# Patient Record
Sex: MALE | Race: WHITE | Employment: OTHER | URBAN - METROPOLITAN AREA
[De-identification: names, ages, dates, MRNs, and addresses within clinical notes are randomized per-mention and may not be internally consistent; named-entity substitution may affect disease eponyms.]

---

## 2017-12-19 ENCOUNTER — HOSPITAL ENCOUNTER (EMERGENCY)
Age: 52
Discharge: HOME | End: 2017-12-19
Attending: EMERGENCY MEDICINE
Payer: MEDICARE

## 2017-12-19 VITALS
OXYGEN SATURATION: 96 % | SYSTOLIC BLOOD PRESSURE: 142 MMHG | TEMPERATURE: 98.5 F | BODY MASS INDEX: 30.05 KG/M2 | HEIGHT: 70 IN | DIASTOLIC BLOOD PRESSURE: 76 MMHG | WEIGHT: 209.88 LBS | RESPIRATION RATE: 18 BRPM | HEART RATE: 63 BPM

## 2017-12-19 DIAGNOSIS — M54.50 ACUTE EXACERBATION OF CHRONIC LOW BACK PAIN: Primary | ICD-10-CM

## 2017-12-19 DIAGNOSIS — G89.29 ACUTE EXACERBATION OF CHRONIC LOW BACK PAIN: Primary | ICD-10-CM

## 2017-12-19 PROCEDURE — 96372 THER/PROPH/DIAG INJ SC/IM: CPT

## 2017-12-19 PROCEDURE — 99282 EMERGENCY DEPT VISIT SF MDM: CPT

## 2017-12-19 PROCEDURE — 6360000002 HC RX W HCPCS: Performed by: PHYSICIAN ASSISTANT

## 2017-12-19 RX ORDER — IBUPROFEN 800 MG/1
800 TABLET ORAL EVERY 8 HOURS PRN
Qty: 30 TABLET | Refills: 0 | Status: ON HOLD | OUTPATIENT
Start: 2017-12-19 | End: 2018-06-01 | Stop reason: HOSPADM

## 2017-12-19 RX ORDER — ORPHENADRINE CITRATE 30 MG/ML
60 INJECTION INTRAMUSCULAR; INTRAVENOUS ONCE
Status: COMPLETED | OUTPATIENT
Start: 2017-12-19 | End: 2017-12-19

## 2017-12-19 RX ORDER — MORPHINE SULFATE 10 MG/ML
10 INJECTION, SOLUTION INTRAMUSCULAR; INTRAVENOUS ONCE
Status: COMPLETED | OUTPATIENT
Start: 2017-12-19 | End: 2017-12-19

## 2017-12-19 RX ORDER — HYDROCODONE BITARTRATE AND ACETAMINOPHEN 5; 325 MG/1; MG/1
1 TABLET ORAL EVERY 6 HOURS PRN
Qty: 20 TABLET | Refills: 0 | Status: SHIPPED | OUTPATIENT
Start: 2017-12-19 | End: 2017-12-26

## 2017-12-19 RX ORDER — METHOCARBAMOL 750 MG/1
750 TABLET, FILM COATED ORAL 4 TIMES DAILY
Qty: 40 TABLET | Refills: 0 | Status: SHIPPED | OUTPATIENT
Start: 2017-12-19 | End: 2017-12-29

## 2017-12-19 RX ORDER — ONDANSETRON 4 MG/1
4 TABLET, ORALLY DISINTEGRATING ORAL ONCE
Status: COMPLETED | OUTPATIENT
Start: 2017-12-19 | End: 2017-12-19

## 2017-12-19 RX ADMIN — ONDANSETRON 4 MG: 4 TABLET, ORALLY DISINTEGRATING ORAL at 17:00

## 2017-12-19 RX ADMIN — MORPHINE SULFATE 10 MG: 10 INJECTION, SOLUTION INTRAMUSCULAR; INTRAVENOUS at 17:00

## 2017-12-19 RX ADMIN — ORPHENADRINE CITRATE 60 MG: 30 INJECTION INTRAMUSCULAR; INTRAVENOUS at 16:59

## 2017-12-19 ASSESSMENT — PAIN DESCRIPTION - PAIN TYPE: TYPE: ACUTE PAIN

## 2017-12-19 ASSESSMENT — PAIN DESCRIPTION - LOCATION: LOCATION: BACK

## 2017-12-19 ASSESSMENT — PAIN DESCRIPTION - ORIENTATION: ORIENTATION: LOWER

## 2017-12-19 ASSESSMENT — PAIN DESCRIPTION - FREQUENCY: FREQUENCY: CONTINUOUS

## 2017-12-19 ASSESSMENT — PAIN DESCRIPTION - ONSET: ONSET: ON-GOING

## 2017-12-19 ASSESSMENT — PAIN DESCRIPTION - PROGRESSION: CLINICAL_PROGRESSION: NOT CHANGED

## 2017-12-19 ASSESSMENT — PAIN DESCRIPTION - DESCRIPTORS: DESCRIPTORS: SHARP;SHOOTING

## 2017-12-19 ASSESSMENT — PAIN SCALES - GENERAL: PAINLEVEL_OUTOF10: 7

## 2017-12-19 NOTE — ED PROVIDER NOTES
[facility] ED  eMERGENCY dEPARTMENT eNCOUnter      Pt Name: Graham Hodgkin  MRN: [id number]  [place] 1965  Date of evaluation: 12/19/2017  Provider: Yue Jackson Dr       Chief Complaint   Patient presents with    Back Pain         HISTORY OF PRESENT ILLNESS  (Location/Symptom, Timing/Onset, Context/Setting, Quality, Duration, Modifying Factors, Severity.)   Graham Hodgkin is a 47 y.o. male who presents to the emergency department with Back pain since earlier today at work. Patient with history of chronic back pain. He sees Dr. Moises Tineo who according to patient was to do surgery but patient has elected not to do so due to the fact he cannot miss work for 2 months. He was doing some bending and lifting at work and tweaked his back and now describes the pain as moderate, sharp, stiff and constant. Pain is worse with movement and relieved with rest.  Denies any urinary or bowel incontinence. Nursing Notes were reviewed. ALLERGIES     Sulfa antibiotics    CURRENT MEDICATIONS       Previous Medications    ALFUZOSIN (UROXATRAL) 10 MG EXTENDED RELEASE TABLET    Take 1 tablet by mouth daily (with breakfast)    ONDANSETRON (ZOFRAN ODT) 4 MG DISINTEGRATING TABLET    Take 1 tablet by mouth every 8 hours as needed for Nausea    OXYBUTYNIN (DITROPAN-XL) 5 MG EXTENDED RELEASE TABLET    Take 1 tablet by mouth nightly       PAST MEDICAL HISTORY         Diagnosis Date    Degenerative disc disease        SURGICAL HISTORY           Procedure Laterality Date    APPENDECTOMY      CYSTOSCOPY  10/05/2016    LITHOTRIPSY Right 10/19/2016         FAMILY HISTORY     History reviewed. No pertinent family history. No family status information on file. SOCIAL HISTORY      reports that he has quit smoking. His smoking use included Cigarettes. He has never used smokeless tobacco. He reports that he drinks alcohol. He reports that he does not use drugs.     REVIEW OF SYSTEMS    (2-9 systems for level 4, 10 or more for level 5)   Review of Systems    Except as noted above the remainder of the review of systems was reviewed and negative. PHYSICAL EXAM    (up to 7 for level 4, 8 or more for level 5)     ED Triage Vitals [12/19/17 1620]   BP Temp Temp Source Pulse Resp SpO2 Height Weight   (!) 142/76 98.5 °F (36.9 °C) Oral 63 18 96 % 5' 10\" (1.778 m) 209 lb 14.1 oz (95.2 kg)     Physical Exam   Constitutional: He is oriented to person, place, and time. He appears well-developed. HENT:   Head: Normocephalic and atraumatic. Eyes: EOM are normal.   Neck: Normal range of motion. Neck supple. Cardiovascular: Normal rate and regular rhythm. Pulmonary/Chest: Effort normal and breath sounds normal.   Abdominal: Soft. Musculoskeletal: Normal range of motion. Back:    Neurological: He is alert and oriented to person, place, and time. Skin: Skin is warm. Psychiatric: He has a normal mood and affect. His behavior is normal.               DIAGNOSTIC RESULTS     EKG: All EKG's are interpreted by the Emergency Department Physician who either signs or Co-signs this chart in the absence of a cardiologist.        RADIOLOGY:   Non-plain film images such as CT, Ultrasound and MRI are read by the radiologist. Plain radiographic images are visualized and preliminarily interpreted by the emergency physician with the below findings:        Interpretation per the Radiologist below, if available at the time of this note:          ED BEDSIDE ULTRASOUND:   Performed by ED Physician - none    LABS:  Labs Reviewed - No data to display    All other labs were within normal range or not returned as of this dictation.     EMERGENCY DEPARTMENT COURSE and DIFFERENTIAL DIAGNOSIS/MDM:   Vitals:    Vitals:    12/19/17 1620   BP: (!) 142/76   Pulse: 63   Resp: 18   Temp: 98.5 °F (36.9 °C)   TempSrc: Oral   SpO2: 96%   Weight: 209 lb 14.1 oz (95.2 kg)   Height: 5' 10\" (1.778 m)     Patient has had aggressive

## 2018-05-23 ENCOUNTER — HOSPITAL ENCOUNTER (OUTPATIENT)
Dept: PREADMISSION TESTING | Age: 53
Discharge: HOME | End: 2018-05-27
Payer: MEDICARE

## 2018-05-23 VITALS
OXYGEN SATURATION: 96 % | HEIGHT: 70 IN | DIASTOLIC BLOOD PRESSURE: 72 MMHG | BODY MASS INDEX: 28.06 KG/M2 | HEART RATE: 72 BPM | SYSTOLIC BLOOD PRESSURE: 107 MMHG | WEIGHT: 196 LBS | TEMPERATURE: 98.8 F | RESPIRATION RATE: 16 BRPM

## 2018-05-23 LAB
ABSOLUTE EOS #: 0.12 K/UL (ref 0–0.44)
ABSOLUTE IMMATURE GRANULOCYTE: 0.04 K/UL (ref 0–0.3)
ABSOLUTE LYMPH #: 1.83 K/UL (ref 1.1–3.7)
ABSOLUTE MONO #: 0.7 K/UL (ref 0.1–1.2)
ANION GAP SERPL CALCULATED.3IONS-SCNC: 18 MMOL/L (ref 9–17)
BASOPHILS # BLD: 1 % (ref 0–2)
BASOPHILS ABSOLUTE: 0.09 K/UL (ref 0–0.2)
CHLORIDE BLD-SCNC: 107 MMOL/L (ref 98–107)
CO2: 22 MMOL/L (ref 20–31)
DIFFERENTIAL TYPE: ABNORMAL
EKG ATRIAL RATE: 69 BPM
EKG P AXIS: 45 DEGREES
EKG P-R INTERVAL: 170 MS
EKG Q-T INTERVAL: 392 MS
EKG QRS DURATION: 92 MS
EKG QTC CALCULATION (BAZETT): 420 MS
EKG R AXIS: -34 DEGREES
EKG T AXIS: -4 DEGREES
EKG VENTRICULAR RATE: 69 BPM
EOSINOPHILS RELATIVE PERCENT: 1 % (ref 1–4)
HCT VFR BLD CALC: 45.1 % (ref 40.7–50.3)
HEMOGLOBIN: 15.3 G/DL (ref 13–17)
IMMATURE GRANULOCYTES: 0 %
LYMPHOCYTES # BLD: 19 % (ref 24–43)
MCH RBC QN AUTO: 30.3 PG (ref 25.2–33.5)
MCHC RBC AUTO-ENTMCNC: 33.9 G/DL (ref 28.4–34.8)
MCV RBC AUTO: 89.3 FL (ref 82.6–102.9)
MONOCYTES # BLD: 7 % (ref 3–12)
NRBC AUTOMATED: 0 PER 100 WBC
PDW BLD-RTO: 12.5 % (ref 11.8–14.4)
PLATELET # BLD: 185 K/UL (ref 138–453)
PLATELET ESTIMATE: ABNORMAL
PMV BLD AUTO: 9.4 FL (ref 8.1–13.5)
POTASSIUM SERPL-SCNC: 4.2 MMOL/L (ref 3.7–5.3)
RBC # BLD: 5.05 M/UL (ref 4.21–5.77)
RBC # BLD: ABNORMAL 10*6/UL
SEG NEUTROPHILS: 72 % (ref 36–65)
SEGMENTED NEUTROPHILS ABSOLUTE COUNT: 6.68 K/UL (ref 1.5–8.1)
SODIUM BLD-SCNC: 147 MMOL/L (ref 135–144)
WBC # BLD: 9.5 K/UL (ref 3.5–11.3)
WBC # BLD: ABNORMAL 10*3/UL

## 2018-05-23 PROCEDURE — 36415 COLL VENOUS BLD VENIPUNCTURE: CPT

## 2018-05-23 PROCEDURE — 85025 COMPLETE CBC W/AUTO DIFF WBC: CPT

## 2018-05-23 PROCEDURE — 80051 ELECTROLYTE PANEL: CPT

## 2018-05-23 PROCEDURE — 93005 ELECTROCARDIOGRAM TRACING: CPT

## 2018-05-23 RX ORDER — SODIUM CHLORIDE, SODIUM LACTATE, POTASSIUM CHLORIDE, CALCIUM CHLORIDE 600; 310; 30; 20 MG/100ML; MG/100ML; MG/100ML; MG/100ML
1000 INJECTION, SOLUTION INTRAVENOUS CONTINUOUS
Status: CANCELLED | OUTPATIENT
Start: 2018-05-23

## 2018-05-23 ASSESSMENT — PAIN SCALES - GENERAL: PAINLEVEL_OUTOF10: 7

## 2018-05-23 ASSESSMENT — PAIN DESCRIPTION - ORIENTATION: ORIENTATION: LOWER

## 2018-05-23 ASSESSMENT — PAIN DESCRIPTION - PAIN TYPE: TYPE: CHRONIC PAIN

## 2018-05-23 ASSESSMENT — PAIN DESCRIPTION - LOCATION: LOCATION: BACK

## 2018-05-30 ENCOUNTER — APPOINTMENT (OUTPATIENT)
Dept: GENERAL RADIOLOGY | Age: 53
DRG: 304 | End: 2018-05-30
Attending: NEUROLOGICAL SURGERY
Payer: MEDICARE

## 2018-05-30 ENCOUNTER — HOSPITAL ENCOUNTER (INPATIENT)
Age: 53
LOS: 2 days | Discharge: HOME | DRG: 304 | End: 2018-06-01
Attending: NEUROLOGICAL SURGERY | Admitting: NEUROLOGICAL SURGERY
Payer: MEDICARE

## 2018-05-30 ENCOUNTER — ANESTHESIA EVENT (OUTPATIENT)
Dept: OPERATING ROOM | Age: 53
DRG: 304 | End: 2018-05-30
Payer: MEDICARE

## 2018-05-30 ENCOUNTER — ANESTHESIA (OUTPATIENT)
Dept: OPERATING ROOM | Age: 53
DRG: 304 | End: 2018-05-30
Payer: MEDICARE

## 2018-05-30 VITALS — SYSTOLIC BLOOD PRESSURE: 142 MMHG | DIASTOLIC BLOOD PRESSURE: 52 MMHG | TEMPERATURE: 98.7 F | OXYGEN SATURATION: 99 %

## 2018-05-30 DIAGNOSIS — M48.062 LUMBAR STENOSIS WITH NEUROGENIC CLAUDICATION: Primary | ICD-10-CM

## 2018-05-30 PROCEDURE — 3600000004 HC SURGERY LEVEL 4 BASE: Performed by: NEUROLOGICAL SURGERY

## 2018-05-30 PROCEDURE — 6360000002 HC RX W HCPCS: Performed by: NEUROLOGICAL SURGERY

## 2018-05-30 PROCEDURE — 3700000001 HC ADD 15 MINUTES (ANESTHESIA): Performed by: NEUROLOGICAL SURGERY

## 2018-05-30 PROCEDURE — 7100000001 HC PACU RECOVERY - ADDTL 15 MIN: Performed by: NEUROLOGICAL SURGERY

## 2018-05-30 PROCEDURE — 0SG0071 FUSION OF LUMBAR VERTEBRAL JOINT WITH AUTOLOGOUS TISSUE SUBSTITUTE, POSTERIOR APPROACH, POSTERIOR COLUMN, OPEN APPROACH: ICD-10-PCS | Performed by: NEUROLOGICAL SURGERY

## 2018-05-30 PROCEDURE — C1713 ANCHOR/SCREW BN/BN,TIS/BN: HCPCS | Performed by: NEUROLOGICAL SURGERY

## 2018-05-30 PROCEDURE — C9362 IMPLNT,BON VOID FILLER-STRIP: HCPCS | Performed by: NEUROLOGICAL SURGERY

## 2018-05-30 PROCEDURE — 3700000000 HC ANESTHESIA ATTENDED CARE: Performed by: NEUROLOGICAL SURGERY

## 2018-05-30 PROCEDURE — 2580000003 HC RX 258: Performed by: NEUROLOGICAL SURGERY

## 2018-05-30 PROCEDURE — 1200000000 HC SEMI PRIVATE

## 2018-05-30 PROCEDURE — 2780000010 HC IMPLANT OTHER: Performed by: NEUROLOGICAL SURGERY

## 2018-05-30 PROCEDURE — 6370000000 HC RX 637 (ALT 250 FOR IP): Performed by: NEUROLOGICAL SURGERY

## 2018-05-30 PROCEDURE — 6360000002 HC RX W HCPCS: Performed by: ANESTHESIOLOGY

## 2018-05-30 PROCEDURE — 0SG00AJ FUSION OF LUMBAR VERTEBRAL JOINT WITH INTERBODY FUSION DEVICE, POSTERIOR APPROACH, ANTERIOR COLUMN, OPEN APPROACH: ICD-10-PCS | Performed by: NEUROLOGICAL SURGERY

## 2018-05-30 PROCEDURE — A6402 STERILE GAUZE <= 16 SQ IN: HCPCS | Performed by: NEUROLOGICAL SURGERY

## 2018-05-30 PROCEDURE — L8699 PROSTHETIC IMPLANT NOS: HCPCS | Performed by: NEUROLOGICAL SURGERY

## 2018-05-30 PROCEDURE — 3600000014 HC SURGERY LEVEL 4 ADDTL 15MIN: Performed by: NEUROLOGICAL SURGERY

## 2018-05-30 PROCEDURE — 7100000000 HC PACU RECOVERY - FIRST 15 MIN: Performed by: NEUROLOGICAL SURGERY

## 2018-05-30 PROCEDURE — 72100 X-RAY EXAM L-S SPINE 2/3 VWS: CPT

## 2018-05-30 PROCEDURE — 2580000003 HC RX 258: Performed by: ANESTHESIOLOGY

## 2018-05-30 PROCEDURE — 2720000010 HC SURG SUPPLY STERILE: Performed by: NEUROLOGICAL SURGERY

## 2018-05-30 PROCEDURE — 2500000003 HC RX 250 WO HCPCS: Performed by: NEUROLOGICAL SURGERY

## 2018-05-30 PROCEDURE — 2500000003 HC RX 250 WO HCPCS: Performed by: NURSE ANESTHETIST, CERTIFIED REGISTERED

## 2018-05-30 PROCEDURE — 6360000002 HC RX W HCPCS: Performed by: NURSE ANESTHETIST, CERTIFIED REGISTERED

## 2018-05-30 DEVICE — GRAFT BNE SUB 7.2CC W15XL50MM MINERALIZED CLLGN SCFLD: Type: IMPLANTABLE DEVICE | Site: SPINE LUMBAR | Status: FUNCTIONAL

## 2018-05-30 DEVICE — SCREW SPNL L40MM DIA6.5MM POLYAX CANN: Type: IMPLANTABLE DEVICE | Site: SPINE LUMBAR | Status: FUNCTIONAL

## 2018-05-30 DEVICE — SET SCR SPNL STD PEEK SILVERTON: Type: IMPLANTABLE DEVICE | Site: SPINE LUMBAR | Status: FUNCTIONAL

## 2018-05-30 DEVICE — IMPLANTABLE DEVICE: Type: IMPLANTABLE DEVICE | Site: SPINE LUMBAR | Status: FUNCTIONAL

## 2018-05-30 DEVICE — SCREW LANX MINI-INVAS 6.5X50: Type: IMPLANTABLE DEVICE | Site: SPINE LUMBAR | Status: FUNCTIONAL

## 2018-05-30 RX ORDER — SODIUM CHLORIDE, SODIUM LACTATE, POTASSIUM CHLORIDE, CALCIUM CHLORIDE 600; 310; 30; 20 MG/100ML; MG/100ML; MG/100ML; MG/100ML
INJECTION, SOLUTION INTRAVENOUS CONTINUOUS
Status: DISCONTINUED | OUTPATIENT
Start: 2018-05-30 | End: 2018-06-01 | Stop reason: HOSPADM

## 2018-05-30 RX ORDER — DOCUSATE SODIUM 100 MG/1
100 CAPSULE, LIQUID FILLED ORAL 2 TIMES DAILY
Status: DISCONTINUED | OUTPATIENT
Start: 2018-05-30 | End: 2018-06-01 | Stop reason: HOSPADM

## 2018-05-30 RX ORDER — LIDOCAINE HYDROCHLORIDE 10 MG/ML
INJECTION, SOLUTION EPIDURAL; INFILTRATION; INTRACAUDAL; PERINEURAL PRN
Status: DISCONTINUED | OUTPATIENT
Start: 2018-05-30 | End: 2018-05-30 | Stop reason: SDUPTHER

## 2018-05-30 RX ORDER — ACETAMINOPHEN 325 MG/1
650 TABLET ORAL EVERY 4 HOURS PRN
Status: DISCONTINUED | OUTPATIENT
Start: 2018-05-30 | End: 2018-06-01 | Stop reason: HOSPADM

## 2018-05-30 RX ORDER — MORPHINE SULFATE 2 MG/ML
2 INJECTION, SOLUTION INTRAMUSCULAR; INTRAVENOUS
Status: DISCONTINUED | OUTPATIENT
Start: 2018-05-30 | End: 2018-06-01 | Stop reason: HOSPADM

## 2018-05-30 RX ORDER — FENTANYL CITRATE 50 UG/ML
50 INJECTION, SOLUTION INTRAMUSCULAR; INTRAVENOUS ONCE
Status: DISCONTINUED | OUTPATIENT
Start: 2018-05-30 | End: 2018-06-01 | Stop reason: HOSPADM

## 2018-05-30 RX ORDER — MIDAZOLAM HYDROCHLORIDE 1 MG/ML
1 INJECTION INTRAMUSCULAR; INTRAVENOUS
Status: ACTIVE | OUTPATIENT
Start: 2018-05-30 | End: 2018-05-30

## 2018-05-30 RX ORDER — FENTANYL CITRATE 50 UG/ML
50 INJECTION, SOLUTION INTRAMUSCULAR; INTRAVENOUS EVERY 5 MIN PRN
Status: COMPLETED | OUTPATIENT
Start: 2018-05-30 | End: 2018-05-30

## 2018-05-30 RX ORDER — GINSENG 100 MG
CAPSULE ORAL PRN
Status: DISCONTINUED | OUTPATIENT
Start: 2018-05-30 | End: 2018-05-30 | Stop reason: HOSPADM

## 2018-05-30 RX ORDER — SODIUM CHLORIDE 0.9 % (FLUSH) 0.9 %
10 SYRINGE (ML) INJECTION EVERY 12 HOURS SCHEDULED
Status: DISCONTINUED | OUTPATIENT
Start: 2018-05-30 | End: 2018-06-01 | Stop reason: HOSPADM

## 2018-05-30 RX ORDER — MAGNESIUM HYDROXIDE 1200 MG/15ML
LIQUID ORAL CONTINUOUS PRN
Status: COMPLETED | OUTPATIENT
Start: 2018-05-30 | End: 2018-05-30

## 2018-05-30 RX ORDER — LIDOCAINE HYDROCHLORIDE AND EPINEPHRINE 10; 10 MG/ML; UG/ML
INJECTION, SOLUTION INFILTRATION; PERINEURAL PRN
Status: DISCONTINUED | OUTPATIENT
Start: 2018-05-30 | End: 2018-05-30 | Stop reason: HOSPADM

## 2018-05-30 RX ORDER — OXYCODONE HYDROCHLORIDE AND ACETAMINOPHEN 5; 325 MG/1; MG/1
1 TABLET ORAL EVERY 4 HOURS PRN
Status: DISCONTINUED | OUTPATIENT
Start: 2018-05-30 | End: 2018-06-01 | Stop reason: HOSPADM

## 2018-05-30 RX ORDER — ONDANSETRON 2 MG/ML
4 INJECTION INTRAMUSCULAR; INTRAVENOUS
Status: DISCONTINUED | OUTPATIENT
Start: 2018-05-30 | End: 2018-05-30 | Stop reason: HOSPADM

## 2018-05-30 RX ORDER — GLYCOPYRROLATE 1 MG/5 ML
SYRINGE (ML) INTRAVENOUS PRN
Status: DISCONTINUED | OUTPATIENT
Start: 2018-05-30 | End: 2018-05-30 | Stop reason: SDUPTHER

## 2018-05-30 RX ORDER — ONDANSETRON 2 MG/ML
INJECTION INTRAMUSCULAR; INTRAVENOUS PRN
Status: DISCONTINUED | OUTPATIENT
Start: 2018-05-30 | End: 2018-05-30 | Stop reason: SDUPTHER

## 2018-05-30 RX ORDER — LABETALOL HYDROCHLORIDE 5 MG/ML
5 INJECTION, SOLUTION INTRAVENOUS EVERY 10 MIN PRN
Status: DISCONTINUED | OUTPATIENT
Start: 2018-05-30 | End: 2018-05-30 | Stop reason: HOSPADM

## 2018-05-30 RX ORDER — OXYCODONE HYDROCHLORIDE AND ACETAMINOPHEN 5; 325 MG/1; MG/1
2 TABLET ORAL EVERY 4 HOURS PRN
Status: DISCONTINUED | OUTPATIENT
Start: 2018-05-30 | End: 2018-06-01 | Stop reason: HOSPADM

## 2018-05-30 RX ORDER — NEOSTIGMINE METHYLSULFATE 5 MG/5 ML
SYRINGE (ML) INTRAVENOUS PRN
Status: DISCONTINUED | OUTPATIENT
Start: 2018-05-30 | End: 2018-05-30 | Stop reason: SDUPTHER

## 2018-05-30 RX ORDER — FENTANYL CITRATE 50 UG/ML
INJECTION, SOLUTION INTRAMUSCULAR; INTRAVENOUS PRN
Status: DISCONTINUED | OUTPATIENT
Start: 2018-05-30 | End: 2018-05-30 | Stop reason: SDUPTHER

## 2018-05-30 RX ORDER — ROCURONIUM BROMIDE 10 MG/ML
INJECTION, SOLUTION INTRAVENOUS PRN
Status: DISCONTINUED | OUTPATIENT
Start: 2018-05-30 | End: 2018-05-30 | Stop reason: SDUPTHER

## 2018-05-30 RX ORDER — LIDOCAINE HYDROCHLORIDE 10 MG/ML
1 INJECTION, SOLUTION EPIDURAL; INFILTRATION; INTRACAUDAL; PERINEURAL
Status: DISPENSED | OUTPATIENT
Start: 2018-05-30 | End: 2018-05-30

## 2018-05-30 RX ORDER — DEXAMETHASONE SODIUM PHOSPHATE 10 MG/ML
INJECTION INTRAMUSCULAR; INTRAVENOUS PRN
Status: DISCONTINUED | OUTPATIENT
Start: 2018-05-30 | End: 2018-05-30 | Stop reason: SDUPTHER

## 2018-05-30 RX ORDER — SODIUM CHLORIDE, SODIUM LACTATE, POTASSIUM CHLORIDE, CALCIUM CHLORIDE 600; 310; 30; 20 MG/100ML; MG/100ML; MG/100ML; MG/100ML
1000 INJECTION, SOLUTION INTRAVENOUS CONTINUOUS
Status: DISCONTINUED | OUTPATIENT
Start: 2018-05-30 | End: 2018-05-30

## 2018-05-30 RX ORDER — SODIUM CHLORIDE 0.9 % (FLUSH) 0.9 %
10 SYRINGE (ML) INJECTION PRN
Status: DISCONTINUED | OUTPATIENT
Start: 2018-05-30 | End: 2018-06-01 | Stop reason: HOSPADM

## 2018-05-30 RX ORDER — PROPOFOL 10 MG/ML
INJECTION, EMULSION INTRAVENOUS PRN
Status: DISCONTINUED | OUTPATIENT
Start: 2018-05-30 | End: 2018-05-30 | Stop reason: SDUPTHER

## 2018-05-30 RX ORDER — MORPHINE SULFATE 2 MG/ML
4 INJECTION, SOLUTION INTRAMUSCULAR; INTRAVENOUS
Status: DISCONTINUED | OUTPATIENT
Start: 2018-05-30 | End: 2018-06-01 | Stop reason: HOSPADM

## 2018-05-30 RX ADMIN — PROPOFOL 200 MG: 10 INJECTION, EMULSION INTRAVENOUS at 11:23

## 2018-05-30 RX ADMIN — DOCUSATE SODIUM 100 MG: 100 CAPSULE ORAL at 20:04

## 2018-05-30 RX ADMIN — FENTANYL CITRATE 50 MCG: 50 INJECTION INTRAMUSCULAR; INTRAVENOUS at 14:30

## 2018-05-30 RX ADMIN — SODIUM CHLORIDE, POTASSIUM CHLORIDE, SODIUM LACTATE AND CALCIUM CHLORIDE: 600; 310; 30; 20 INJECTION, SOLUTION INTRAVENOUS at 11:19

## 2018-05-30 RX ADMIN — ROCURONIUM BROMIDE 50 MG: 10 INJECTION INTRAVENOUS at 11:23

## 2018-05-30 RX ADMIN — SODIUM CHLORIDE, POTASSIUM CHLORIDE, SODIUM LACTATE AND CALCIUM CHLORIDE: 600; 310; 30; 20 INJECTION, SOLUTION INTRAVENOUS at 13:12

## 2018-05-30 RX ADMIN — HYDROMORPHONE HYDROCHLORIDE 0.5 MG: 1 INJECTION, SOLUTION INTRAMUSCULAR; INTRAVENOUS; SUBCUTANEOUS at 15:40

## 2018-05-30 RX ADMIN — HYDROMORPHONE HYDROCHLORIDE 0.5 MG: 1 INJECTION, SOLUTION INTRAMUSCULAR; INTRAVENOUS; SUBCUTANEOUS at 16:05

## 2018-05-30 RX ADMIN — HYDROMORPHONE HYDROCHLORIDE 0.5 MG: 1 INJECTION, SOLUTION INTRAMUSCULAR; INTRAVENOUS; SUBCUTANEOUS at 15:00

## 2018-05-30 RX ADMIN — HYDROMORPHONE HYDROCHLORIDE 0.5 MG: 1 INJECTION, SOLUTION INTRAMUSCULAR; INTRAVENOUS; SUBCUTANEOUS at 16:00

## 2018-05-30 RX ADMIN — Medication 10 ML: at 20:05

## 2018-05-30 RX ADMIN — Medication 2 G: at 11:47

## 2018-05-30 RX ADMIN — Medication 2 G: at 20:09

## 2018-05-30 RX ADMIN — Medication 4 MG: at 14:15

## 2018-05-30 RX ADMIN — Medication 0.2 MG: at 12:13

## 2018-05-30 RX ADMIN — MORPHINE SULFATE 4 MG: 2 INJECTION, SOLUTION INTRAMUSCULAR; INTRAVENOUS at 17:11

## 2018-05-30 RX ADMIN — FENTANYL CITRATE 50 MCG: 50 INJECTION, SOLUTION INTRAMUSCULAR; INTRAVENOUS at 14:40

## 2018-05-30 RX ADMIN — FENTANYL CITRATE 100 MCG: 50 INJECTION INTRAMUSCULAR; INTRAVENOUS at 11:23

## 2018-05-30 RX ADMIN — LIDOCAINE HYDROCHLORIDE 50 MG: 10 INJECTION, SOLUTION EPIDURAL; INFILTRATION; INTRACAUDAL; PERINEURAL at 11:23

## 2018-05-30 RX ADMIN — FENTANYL CITRATE 50 MCG: 50 INJECTION, SOLUTION INTRAMUSCULAR; INTRAVENOUS at 14:50

## 2018-05-30 RX ADMIN — Medication 0.6 MG: at 14:15

## 2018-05-30 RX ADMIN — OXYCODONE HYDROCHLORIDE AND ACETAMINOPHEN 2 TABLET: 5; 325 TABLET ORAL at 20:01

## 2018-05-30 RX ADMIN — SODIUM CHLORIDE, POTASSIUM CHLORIDE, SODIUM LACTATE AND CALCIUM CHLORIDE: 600; 310; 30; 20 INJECTION, SOLUTION INTRAVENOUS at 17:07

## 2018-05-30 RX ADMIN — ONDANSETRON 4 MG: 2 INJECTION INTRAMUSCULAR; INTRAVENOUS at 13:47

## 2018-05-30 RX ADMIN — DEXAMETHASONE SODIUM PHOSPHATE 10 MG: 10 INJECTION INTRAMUSCULAR; INTRAVENOUS at 12:13

## 2018-05-30 RX ADMIN — FENTANYL CITRATE 50 MCG: 50 INJECTION INTRAMUSCULAR; INTRAVENOUS at 13:10

## 2018-05-30 ASSESSMENT — PULMONARY FUNCTION TESTS
PIF_VALUE: 19
PIF_VALUE: 20
PIF_VALUE: 19
PIF_VALUE: 20
PIF_VALUE: 17
PIF_VALUE: 21
PIF_VALUE: 20
PIF_VALUE: 21
PIF_VALUE: 20
PIF_VALUE: 21
PIF_VALUE: 19
PIF_VALUE: 19
PIF_VALUE: 20
PIF_VALUE: 22
PIF_VALUE: 3
PIF_VALUE: 19
PIF_VALUE: 19
PIF_VALUE: 18
PIF_VALUE: 16
PIF_VALUE: 20
PIF_VALUE: 19
PIF_VALUE: 20
PIF_VALUE: 19
PIF_VALUE: 20
PIF_VALUE: 17
PIF_VALUE: 17
PIF_VALUE: 20
PIF_VALUE: 20
PIF_VALUE: 18
PIF_VALUE: 20
PIF_VALUE: 0
PIF_VALUE: 20
PIF_VALUE: 19
PIF_VALUE: 17
PIF_VALUE: 20
PIF_VALUE: 21
PIF_VALUE: 19
PIF_VALUE: 20
PIF_VALUE: 19
PIF_VALUE: 0
PIF_VALUE: 19
PIF_VALUE: 20
PIF_VALUE: 19
PIF_VALUE: 20
PIF_VALUE: 20
PIF_VALUE: 19
PIF_VALUE: 20
PIF_VALUE: 17
PIF_VALUE: 42
PIF_VALUE: 22
PIF_VALUE: 19
PIF_VALUE: 19
PIF_VALUE: 20
PIF_VALUE: 22
PIF_VALUE: 20
PIF_VALUE: 21
PIF_VALUE: 20
PIF_VALUE: 21
PIF_VALUE: 20
PIF_VALUE: 18
PIF_VALUE: 1
PIF_VALUE: 20
PIF_VALUE: 19
PIF_VALUE: 19
PIF_VALUE: 20
PIF_VALUE: 17
PIF_VALUE: 20
PIF_VALUE: 21
PIF_VALUE: 20
PIF_VALUE: 21
PIF_VALUE: 20
PIF_VALUE: 19
PIF_VALUE: 20
PIF_VALUE: 20
PIF_VALUE: 21
PIF_VALUE: 20
PIF_VALUE: 19
PIF_VALUE: 20
PIF_VALUE: 20
PIF_VALUE: 21
PIF_VALUE: 17
PIF_VALUE: 19
PIF_VALUE: 21
PIF_VALUE: 20
PIF_VALUE: 1
PIF_VALUE: 20
PIF_VALUE: 0
PIF_VALUE: 20
PIF_VALUE: 19
PIF_VALUE: 20
PIF_VALUE: 20
PIF_VALUE: 19
PIF_VALUE: 19
PIF_VALUE: 20
PIF_VALUE: 21
PIF_VALUE: 20
PIF_VALUE: 21
PIF_VALUE: 19
PIF_VALUE: 20
PIF_VALUE: 0
PIF_VALUE: 20
PIF_VALUE: 19
PIF_VALUE: 19
PIF_VALUE: 0
PIF_VALUE: 20
PIF_VALUE: 21
PIF_VALUE: 19
PIF_VALUE: 20
PIF_VALUE: 19
PIF_VALUE: 20
PIF_VALUE: 20
PIF_VALUE: 18
PIF_VALUE: 19
PIF_VALUE: 19
PIF_VALUE: 20
PIF_VALUE: 19
PIF_VALUE: 21
PIF_VALUE: 19
PIF_VALUE: 19
PIF_VALUE: 20
PIF_VALUE: 19
PIF_VALUE: 20
PIF_VALUE: 21
PIF_VALUE: 20
PIF_VALUE: 19
PIF_VALUE: 20
PIF_VALUE: 22
PIF_VALUE: 4
PIF_VALUE: 20
PIF_VALUE: 20
PIF_VALUE: 19
PIF_VALUE: 20
PIF_VALUE: 19
PIF_VALUE: 20
PIF_VALUE: 19
PIF_VALUE: 20
PIF_VALUE: 19
PIF_VALUE: 20
PIF_VALUE: 19
PIF_VALUE: 19
PIF_VALUE: 18
PIF_VALUE: 19
PIF_VALUE: 20
PIF_VALUE: 21
PIF_VALUE: 19
PIF_VALUE: 19
PIF_VALUE: 17
PIF_VALUE: 20
PIF_VALUE: 20
PIF_VALUE: 21
PIF_VALUE: 18
PIF_VALUE: 20

## 2018-05-30 ASSESSMENT — PAIN SCALES - GENERAL
PAINLEVEL_OUTOF10: 8
PAINLEVEL_OUTOF10: 8
PAINLEVEL_OUTOF10: 10
PAINLEVEL_OUTOF10: 7
PAINLEVEL_OUTOF10: 5
PAINLEVEL_OUTOF10: 7
PAINLEVEL_OUTOF10: 10
PAINLEVEL_OUTOF10: 9

## 2018-05-30 ASSESSMENT — PAIN DESCRIPTION - DESCRIPTORS
DESCRIPTORS: ACHING
DESCRIPTORS: ACHING

## 2018-05-30 ASSESSMENT — PAIN - FUNCTIONAL ASSESSMENT: PAIN_FUNCTIONAL_ASSESSMENT: 0-10

## 2018-05-30 ASSESSMENT — PAIN DESCRIPTION - LOCATION
LOCATION: BACK

## 2018-05-30 ASSESSMENT — PAIN DESCRIPTION - PAIN TYPE
TYPE: SURGICAL PAIN

## 2018-05-30 ASSESSMENT — PAIN DESCRIPTION - ORIENTATION
ORIENTATION: LOWER
ORIENTATION: LOWER

## 2018-05-30 ASSESSMENT — PAIN DESCRIPTION - FREQUENCY: FREQUENCY: INTERMITTENT

## 2018-05-30 ASSESSMENT — PAIN DESCRIPTION - ONSET: ONSET: ON-GOING

## 2018-05-31 PROCEDURE — 6370000000 HC RX 637 (ALT 250 FOR IP): Performed by: NEUROLOGICAL SURGERY

## 2018-05-31 PROCEDURE — 6360000002 HC RX W HCPCS: Performed by: NEUROLOGICAL SURGERY

## 2018-05-31 PROCEDURE — 2580000003 HC RX 258: Performed by: NEUROLOGICAL SURGERY

## 2018-05-31 PROCEDURE — 94762 N-INVAS EAR/PLS OXIMTRY CONT: CPT

## 2018-05-31 PROCEDURE — 1200000000 HC SEMI PRIVATE

## 2018-05-31 PROCEDURE — 6370000000 HC RX 637 (ALT 250 FOR IP): Performed by: EMERGENCY MEDICINE

## 2018-05-31 RX ORDER — CYCLOBENZAPRINE HCL 10 MG
10 TABLET ORAL 3 TIMES DAILY PRN
Status: DISCONTINUED | OUTPATIENT
Start: 2018-05-31 | End: 2018-06-01 | Stop reason: HOSPADM

## 2018-05-31 RX ADMIN — OXYCODONE HYDROCHLORIDE AND ACETAMINOPHEN 2 TABLET: 5; 325 TABLET ORAL at 18:04

## 2018-05-31 RX ADMIN — DOCUSATE SODIUM 100 MG: 100 CAPSULE ORAL at 20:20

## 2018-05-31 RX ADMIN — CYCLOBENZAPRINE HYDROCHLORIDE 10 MG: 10 TABLET, FILM COATED ORAL at 23:50

## 2018-05-31 RX ADMIN — MORPHINE SULFATE 4 MG: 2 INJECTION, SOLUTION INTRAMUSCULAR; INTRAVENOUS at 20:20

## 2018-05-31 RX ADMIN — OXYCODONE HYDROCHLORIDE AND ACETAMINOPHEN 2 TABLET: 5; 325 TABLET ORAL at 10:15

## 2018-05-31 RX ADMIN — OXYCODONE HYDROCHLORIDE AND ACETAMINOPHEN 2 TABLET: 5; 325 TABLET ORAL at 00:09

## 2018-05-31 RX ADMIN — OXYCODONE HYDROCHLORIDE AND ACETAMINOPHEN 2 TABLET: 5; 325 TABLET ORAL at 05:59

## 2018-05-31 RX ADMIN — OXYCODONE HYDROCHLORIDE AND ACETAMINOPHEN 2 TABLET: 5; 325 TABLET ORAL at 14:07

## 2018-05-31 RX ADMIN — Medication 10 ML: at 20:20

## 2018-05-31 RX ADMIN — OXYCODONE HYDROCHLORIDE AND ACETAMINOPHEN 2 TABLET: 5; 325 TABLET ORAL at 23:50

## 2018-05-31 ASSESSMENT — PAIN DESCRIPTION - ONSET: ONSET: ON-GOING

## 2018-05-31 ASSESSMENT — PAIN DESCRIPTION - PAIN TYPE
TYPE: ACUTE PAIN;SURGICAL PAIN
TYPE: ACUTE PAIN

## 2018-05-31 ASSESSMENT — PAIN SCALES - GENERAL
PAINLEVEL_OUTOF10: 7
PAINLEVEL_OUTOF10: 6
PAINLEVEL_OUTOF10: 6
PAINLEVEL_OUTOF10: 8
PAINLEVEL_OUTOF10: 7
PAINLEVEL_OUTOF10: 8

## 2018-05-31 ASSESSMENT — PAIN DESCRIPTION - DESCRIPTORS: DESCRIPTORS: ACHING

## 2018-05-31 ASSESSMENT — PAIN DESCRIPTION - ORIENTATION: ORIENTATION: LOWER;MID

## 2018-05-31 ASSESSMENT — PAIN DESCRIPTION - LOCATION
LOCATION: BACK
LOCATION: BACK

## 2018-05-31 ASSESSMENT — PAIN DESCRIPTION - FREQUENCY: FREQUENCY: INTERMITTENT

## 2018-06-01 VITALS
TEMPERATURE: 98.1 F | SYSTOLIC BLOOD PRESSURE: 115 MMHG | HEIGHT: 70 IN | DIASTOLIC BLOOD PRESSURE: 73 MMHG | HEART RATE: 64 BPM | WEIGHT: 196 LBS | OXYGEN SATURATION: 98 % | BODY MASS INDEX: 28.06 KG/M2 | RESPIRATION RATE: 18 BRPM

## 2018-06-01 PROCEDURE — 6370000000 HC RX 637 (ALT 250 FOR IP): Performed by: NEUROLOGICAL SURGERY

## 2018-06-01 PROCEDURE — 2580000003 HC RX 258: Performed by: NEUROLOGICAL SURGERY

## 2018-06-01 RX ORDER — OXYCODONE HYDROCHLORIDE AND ACETAMINOPHEN 5; 325 MG/1; MG/1
1 TABLET ORAL EVERY 4 HOURS PRN
Qty: 28 TABLET | Refills: 0 | Status: SHIPPED | OUTPATIENT
Start: 2018-06-01 | End: 2018-06-08

## 2018-06-01 RX ADMIN — OXYCODONE HYDROCHLORIDE AND ACETAMINOPHEN 2 TABLET: 5; 325 TABLET ORAL at 08:21

## 2018-06-01 RX ADMIN — OXYCODONE HYDROCHLORIDE AND ACETAMINOPHEN 2 TABLET: 5; 325 TABLET ORAL at 03:56

## 2018-06-01 RX ADMIN — Medication 10 ML: at 08:22

## 2018-06-01 RX ADMIN — DOCUSATE SODIUM 100 MG: 100 CAPSULE ORAL at 08:21

## 2018-06-01 RX ADMIN — OXYCODONE HYDROCHLORIDE AND ACETAMINOPHEN 2 TABLET: 5; 325 TABLET ORAL at 13:09

## 2018-06-01 ASSESSMENT — PAIN DESCRIPTION - PROGRESSION: CLINICAL_PROGRESSION: GRADUALLY IMPROVING

## 2018-06-01 ASSESSMENT — PAIN DESCRIPTION - FREQUENCY: FREQUENCY: CONTINUOUS

## 2018-06-01 ASSESSMENT — PAIN DESCRIPTION - LOCATION: LOCATION: BACK

## 2018-06-01 ASSESSMENT — PAIN SCALES - GENERAL
PAINLEVEL_OUTOF10: 7
PAINLEVEL_OUTOF10: 5
PAINLEVEL_OUTOF10: 7
PAINLEVEL_OUTOF10: 5

## 2018-06-01 ASSESSMENT — PAIN DESCRIPTION - DESCRIPTORS: DESCRIPTORS: ACHING

## 2018-06-01 ASSESSMENT — PAIN DESCRIPTION - ONSET: ONSET: ON-GOING

## 2018-06-01 ASSESSMENT — PAIN DESCRIPTION - PAIN TYPE: TYPE: SURGICAL PAIN

## 2019-04-19 ENCOUNTER — HOSPITAL ENCOUNTER (EMERGENCY)
Age: 54
Discharge: HOME | End: 2019-04-19
Attending: EMERGENCY MEDICINE
Payer: MEDICARE

## 2019-04-19 ENCOUNTER — APPOINTMENT (OUTPATIENT)
Dept: GENERAL RADIOLOGY | Age: 54
End: 2019-04-19
Payer: MEDICARE

## 2019-04-19 VITALS
RESPIRATION RATE: 16 BRPM | OXYGEN SATURATION: 98 % | SYSTOLIC BLOOD PRESSURE: 141 MMHG | DIASTOLIC BLOOD PRESSURE: 92 MMHG | HEART RATE: 65 BPM | HEIGHT: 69 IN | TEMPERATURE: 97.9 F | WEIGHT: 200.5 LBS | BODY MASS INDEX: 29.7 KG/M2

## 2019-04-19 DIAGNOSIS — S39.012A BACK STRAIN, INITIAL ENCOUNTER: Primary | ICD-10-CM

## 2019-04-19 PROCEDURE — 72100 X-RAY EXAM L-S SPINE 2/3 VWS: CPT

## 2019-04-19 PROCEDURE — 6370000000 HC RX 637 (ALT 250 FOR IP): Performed by: NURSE PRACTITIONER

## 2019-04-19 PROCEDURE — 99284 EMERGENCY DEPT VISIT MOD MDM: CPT

## 2019-04-19 RX ORDER — HYDROCODONE BITARTRATE AND ACETAMINOPHEN 5; 325 MG/1; MG/1
2 TABLET ORAL ONCE
Status: COMPLETED | OUTPATIENT
Start: 2019-04-19 | End: 2019-04-19

## 2019-04-19 RX ORDER — CYCLOBENZAPRINE HCL 10 MG
10 TABLET ORAL 3 TIMES DAILY PRN
Qty: 30 TABLET | Refills: 0 | Status: SHIPPED | OUTPATIENT
Start: 2019-04-19 | End: 2019-04-29

## 2019-04-19 RX ORDER — HYDROCODONE BITARTRATE AND ACETAMINOPHEN 5; 325 MG/1; MG/1
1 TABLET ORAL EVERY 6 HOURS PRN
Qty: 10 TABLET | Refills: 0 | Status: SHIPPED | OUTPATIENT
Start: 2019-04-19 | End: 2019-04-22

## 2019-04-19 RX ADMIN — HYDROCODONE BITARTRATE AND ACETAMINOPHEN 2 TABLET: 5; 325 TABLET ORAL at 16:08

## 2019-04-19 ASSESSMENT — PAIN SCALES - GENERAL
PAINLEVEL_OUTOF10: 9
PAINLEVEL_OUTOF10: 9

## 2019-04-20 ASSESSMENT — ENCOUNTER SYMPTOMS
NAUSEA: 0
SINUS PRESSURE: 0
COLOR CHANGE: 0
BACK PAIN: 1
WHEEZING: 0
SHORTNESS OF BREATH: 0
DIARRHEA: 0
VOMITING: 0
RHINORRHEA: 0
ABDOMINAL PAIN: 0
SORE THROAT: 0
COUGH: 0
CONSTIPATION: 0

## 2019-04-20 NOTE — ED PROVIDER NOTES
[address] [place] [address] ED  eMERGENCY dEPARTMENT eNCOUnter      Pt Name: Mateo Ragland  MRN: [id number]  [place] 1965  Date of evaluation: 4/19/2019  Provider: [address] [place] [address] NP, DECLAN Peters 6626       Chief Complaint   Patient presents with    Back Pain    Fall         HISTORY OF PRESENT ILLNESS  (Location/Symptom, Timing/Onset, Context/Setting, Quality, Duration, Modifying Factors, Severity.)   Mateo Ragland is a 46 y.o. male who presents to the emergency department by private vehicle for evaluation of back pain. Patient states that he has a history of chronic back pain and has had a lumbar fusion. He states that yesterday he fell at work and now he has pain to the low back. He rates the pain a 9 on a 0-10 scale. He denies any head injury or loss of consciousness. Nursing Notes were reviewed. ALLERGIES     Sulfa antibiotics    CURRENT MEDICATIONS       Discharge Medication List as of 4/19/2019  4:43 PM          PAST MEDICAL HISTORY         Diagnosis Date    Degenerative disc disease        SURGICAL HISTORY           Procedure Laterality Date    APPENDECTOMY      COLONOSCOPY  2017    CYSTOSCOPY  10/05/2016    LITHOTRIPSY Right 10/19/2016    LUMBAR FUSION  05/30/2018    LUMBAR INTERBODY FUSION POSTERIOR L3-4    [place] LUMBAR SPINE FUSN,POST INTRBDY N/A 5/30/2018    LUMBAR INTERBODY FUSION POSTERIOR L3-4,  ([address] - OFFICE TO NOTIFY, ROTATING AARON TABLE, C-ARM) performed by Elena Carrasquillo MD at [address] HISTORY           Problem Relation Age of Onset    High Blood Pressure Mother     Diabetes Mother     Heart Disease Mother      Family Status   Relation Name Status    Mother  Alive        SOCIAL HISTORY      reports that he quit smoking about 30 years ago. His smoking use included cigarettes. He quit after 5.00 years of use. He has never used smokeless tobacco. He reports that he drinks alcohol. He reports that he does not use drugs.     REVIEW and dry. No rash noted. Psychiatric: He has a normal mood and affect. Vitals reviewed. RADIOLOGY:   Non-plain film images such as CT, Ultrasound and MRI are read by the radiologist. Plain radiographic images are visualized and preliminarily interpreted by the emergency physician with the below findings:    Xr Lumbar Spine (2-3 Views)    Result Date: 4/19/2019  EXAMINATION: 3 XRAY VIEWS OF THE LUMBAR SPINE 4/19/2019 4:22 pm COMPARISON: Intraoperative imaging May 30, 2018. Lumbar radiographs August 8, 2017. HISTORY: ORDERING SYSTEM PROVIDED HISTORY: back pain ,fall TECHNOLOGIST PROVIDED HISTORY: back pain ,fall Ordering Physician Provided Reason for Exam: low back pain Acuity: Acute Type of Exam: Initial Mechanism of Injury: fell at work FINDINGS: 4 lumbar type vertebral bodies are present. Transitional anatomy at the lumbosacral junction is noted with a rudimentary disc. Discectomy and transpedicular fusion at L3-4 is noted. Advanced multilevel degenerative disc disease and lower lumbar facet arthropathy is again demonstrated. Chronic appearing mild anterior wedging at L1 is unchanged. No acute osseous abnormality identified. No acute abnormality identified. Multilevel degenerative change in the spine. Status post discectomy and transpedicular fusion at L3-4. Interpretation per the Radiologist below, if available at the time of this note:    XR LUMBAR SPINE (2-3 VIEWS)   Final Result   No acute abnormality identified. Multilevel degenerative change in the   spine. Status post discectomy and transpedicular fusion at L3-4. LABS:  Labs Reviewed - No data to display    All other labs were within normal range or not returned as of this dictation.     EMERGENCY DEPARTMENT COURSE and DIFFERENTIAL DIAGNOSIS/MDM:   Vitals:    Vitals:    04/19/19 1505 04/19/19 1506   BP: (!) 141/92    Pulse: 65    Resp: 16    Temp: 97.9 °F (36.6 °C)    SpO2: 98%    Weight:  200 lb 8 oz (90.9 kg) Height:  5' 9\" (1.753 m)       Medical Decision Making:   Medications   HYDROcodone-acetaminophen (NORCO) 5-325 MG per tablet 2 tablet (2 tablets Oral Given 4/19/19 1608)       FINAL IMPRESSION      1. Back strain, initial encounter          DISPOSITION/PLAN   DISPOSITION Decision To Discharge 04/19/2019 04:42:40 PM      PATIENT REFERRED TO:   Kirsten Fenton MD  [address] [phone]    Call in 2 days      [address] [place] [address] ED  [address] [place] [address]  [phone]    If symptoms worsen      DISCHARGE MEDICATIONS:     Discharge Medication List as of 4/19/2019  4:43 PM      START taking these medications    Details   HYDROcodone-acetaminophen (NORCO) 5-325 MG per tablet Take 1 tablet by mouth every 6 hours as needed for Pain for up to 3 days. , Disp-10 tablet, R-0Print      cyclobenzaprine (FLEXERIL) 10 MG tablet Take 1 tablet by mouth 3 times daily as needed for Muscle spasms, Disp-30 tablet, R-0Print                 (Please note that portions of this note were completed with a voice recognition program.  Efforts were made to edit the dictations but occasionally words are mis-transcribed.)    [address] DEJUAN, APRN - CNP  Certified Nurse Practitioner          DECLAN Cuba - CNP  04/20/19 7619

## 2019-09-18 ENCOUNTER — OFFICE VISIT (OUTPATIENT)
Dept: NEUROLOGY | Age: 54
End: 2019-09-18
Payer: COMMERCIAL

## 2019-09-18 VITALS
DIASTOLIC BLOOD PRESSURE: 89 MMHG | BODY MASS INDEX: 23.05 KG/M2 | WEIGHT: 161 LBS | HEART RATE: 77 BPM | SYSTOLIC BLOOD PRESSURE: 120 MMHG | HEIGHT: 70 IN

## 2019-09-18 DIAGNOSIS — R47.1 DYSARTHRIA: Primary | ICD-10-CM

## 2019-09-18 PROCEDURE — G8420 CALC BMI NORM PARAMETERS: HCPCS | Performed by: PSYCHIATRY & NEUROLOGY

## 2019-09-18 PROCEDURE — 99204 OFFICE O/P NEW MOD 45 MIN: CPT | Performed by: PSYCHIATRY & NEUROLOGY

## 2019-09-18 PROCEDURE — 3017F COLORECTAL CA SCREEN DOC REV: CPT | Performed by: PSYCHIATRY & NEUROLOGY

## 2019-09-18 PROCEDURE — G8427 DOCREV CUR MEDS BY ELIG CLIN: HCPCS | Performed by: PSYCHIATRY & NEUROLOGY

## 2019-09-18 PROCEDURE — 1036F TOBACCO NON-USER: CPT | Performed by: PSYCHIATRY & NEUROLOGY

## 2019-09-18 RX ORDER — TAMSULOSIN HYDROCHLORIDE 0.4 MG/1
0.4 CAPSULE ORAL DAILY
COMMUNITY
End: 2019-10-18 | Stop reason: ALTCHOICE

## 2019-09-18 ASSESSMENT — ENCOUNTER SYMPTOMS
BACK PAIN: 1
EYE DISCHARGE: 1
SHORTNESS OF BREATH: 1
GASTROINTESTINAL NEGATIVE: 1

## 2019-09-18 NOTE — LETTER
Gastrointestinal: Negative. Endocrine: Negative. Genitourinary: Negative. Musculoskeletal: Positive for back pain, gait problem and neck pain. Skin: Negative. Neurological: Positive for dizziness, tremors, speech difficulty and headaches. Hematological: Negative. Psychiatric/Behavioral: Positive for dysphoric mood. Objective:   Physical Exam  Vitals:    09/18/19 0946   BP: 120/89   Pulse: 77     weight: 161 lb (73 kg)    Neurological Examination  Constitutional .General exam well groomed   Head/Ears /Nose/Throat: external ear . Normal exam  Neck and thyroid . Normal size. No bruits  Respiratory . Breathsounds clear bilaterally  Cardiovascular: Auscultation of heart with regular rate and rhythm  Musculoskeletal. Muscle bulk and tone normal                                                           Muscle strength mild giveway weakness throughout                                                                                No dysmetria or dysdiadokinesis  No tremor   Normal fine motor  Gait imbalance   Orientation Alert and oriented x 3   Attention and concentration normal  Short term memory normal  Language process normal . Slow speech with with some dysarthria   Cranial nerve 2 normal acuety and visual fields  Cranial nerve 3, 4 and 6 . Extraocular muscles are intact . Pupils are equal and reactive   Cranial nerve 5 . Normal strength of masseter and temporalis . Intact corneal reflex. Normal facial sensation  Cranial nerve 7 mild decreaae left NLF   Cranial nerve 8. Grossly intact hearing   Cranial nerve 9 and 10. Symmetric palate elevation   Cranial nerve 11 , 5 out of 5 strength   Cranial Nerve 12 midline tongue . No atrophy  Sensation  Decraese pinprick and light touch right foor  Deep Tendon Reflexes brisk througjout   Plantar response flexor bilaterally    Assessment:       Diagnosis Orders   1.  Dysarthria  MRI Brain W Contrast    MRI Cervical Spine WO Contrast He has developed slow speech with dysarthria also with gait imbalance and falling to undergo MRI of Head and MRI of cervical spine       Plan:      Orders Placed This Encounter   Procedures    MRI Brain W Contrast     Standing Status:   Future     Standing Expiration Date:   9/17/2020    MRI Cervical Spine WO Contrast     Standing Status:   Future     Standing Expiration Date:   9/17/2020           Wyatt Cummings MD    If you have questions, please do not hesitate to call me. I look forward to following Salvador Godoy along with you.     Sincerely,        Wyatt Cummings MD    CC providers:  Sagrario Trinh, [address] [place] [address] [place]  [place] [facility] [address] [place] [address]

## 2019-09-19 ENCOUNTER — TELEPHONE (OUTPATIENT)
Dept: NEUROLOGY | Age: 54
End: 2019-09-19

## 2019-09-20 NOTE — COMMUNICATION BODY
Subjective:      Patient ID: Mari Hsu is a 46 y.o. male. HPI  The condition is he reports that he has had trouble with speech for the past  two months. He report that his speech is slow along with trouble coming out with words  . He reports to be weak in right leg when he walks being unsteady falling twice yesterday at work . There will be lightheadednes on standing. He reports to have headahes over vertex head of pressure character of grade 4 over 10 typically three days out of the week on awakening attenuated with OTC motrin . He has chronic low back pain with prior lumbar fusion . Low back pain will be there all the time in midline low back of grade 5 to 6 over 10 going int right buttocks . There is neck pain of grade 5 over 10 nonradiation with intermittent cramping and contraction of both hands. He does not drink alcohol at this time having quit a year ago having no drug use .  He has lost 40 lbs over the last 2 months      Past Medical History:   Diagnosis Date    Degenerative disc disease        Past Surgical History:   Procedure Laterality Date    APPENDECTOMY      COLONOSCOPY  2017    CYSTOSCOPY  10/05/2016    LITHOTRIPSY Right 10/19/2016    LUMBAR FUSION  05/30/2018    LUMBAR INTERBODY FUSION POSTERIOR L3-4    [place] LUMBAR SPINE FUSN,POST INTRBDY N/A 5/30/2018    LUMBAR INTERBODY FUSION POSTERIOR L3-4,  ([address] - OFFICE TO NOTIFY, ROTATING AARON TABLE, C-ARM) performed by Manjula Miranda MD at [place] History   Problem Relation Age of Onset    High Blood Pressure Mother     Diabetes Mother     Heart Disease Mother        Social History     Socioeconomic History    Marital status:      Spouse name: None    Number of children: None    Years of education: None    Highest education level: None   Occupational History    None   Social Needs    Financial resource strain: None    Food insecurity:     Worry: None     Inability: None    Transportation needs: Medical: None     Non-medical: None   Tobacco Use    Smoking status: Former Smoker     Years: 5.00     Types: Cigarettes     Last attempt to quit:      Years since quittin.7    Smokeless tobacco: Never Used   Substance and Sexual Activity    Alcohol use: Yes     Comment: occasional    Drug use: No    Sexual activity: None   Lifestyle    Physical activity:     Days per week: None     Minutes per session: None    Stress: None   Relationships    Social connections:     Talks on phone: None     Gets together: None     Attends [employer] service: None     Active member of club or organization: None     Attends meetings of clubs or organizations: None     Relationship status: None    Intimate partner violence:     Fear of current or ex partner: None     Emotionally abused: None     Physically abused: None     Forced sexual activity: None   Other Topics Concern    None   Social History Narrative    None       Current Outpatient Medications   Medication Sig Dispense Refill    tamsulosin (FLOMAX) 0.4 MG capsule Take 0.4 mg by mouth daily       No current facility-administered medications for this visit. Allergies   Allergen Reactions    Sulfa Antibiotics Other (See Comments)     As child (unknown reaction)       Review of Systems   Constitutional: Positive for appetite change and unexpected weight change. HENT: Negative. Eyes: Positive for discharge. Respiratory: Positive for shortness of breath. Cardiovascular: Negative. Gastrointestinal: Negative. Endocrine: Negative. Genitourinary: Negative. Musculoskeletal: Positive for back pain, gait problem and neck pain. Skin: Negative. Neurological: Positive for dizziness, tremors, speech difficulty and headaches. Hematological: Negative. Psychiatric/Behavioral: Positive for dysphoric mood.        Objective:   Physical Exam  Vitals:    19 0946   BP: 120/89   Pulse: 77     weight: 161 lb (73 kg)    Neurological Examination  Constitutional .General exam well groomed   Head/Ears /Nose/Throat: external ear . Normal exam  Neck and thyroid . Normal size. No bruits  Respiratory . Breathsounds clear bilaterally  Cardiovascular: Auscultation of heart with regular rate and rhythm  Musculoskeletal. Muscle bulk and tone normal                                                           Muscle strength mild giveway weakness throughout                                                                                No dysmetria or dysdiadokinesis  No tremor   Normal fine motor  Gait imbalance   Orientation Alert and oriented x 3   Attention and concentration normal  Short term memory normal  Language process normal . Slow speech with with some dysarthria   Cranial nerve 2 normal acuety and visual fields  Cranial nerve 3, 4 and 6 . Extraocular muscles are intact . Pupils are equal and reactive   Cranial nerve 5 . Normal strength of masseter and temporalis . Intact corneal reflex. Normal facial sensation  Cranial nerve 7 mild decreaae left NLF   Cranial nerve 8. Grossly intact hearing   Cranial nerve 9 and 10. Symmetric palate elevation   Cranial nerve 11 , 5 out of 5 strength   Cranial Nerve 12 midline tongue . No atrophy  Sensation  Decraese pinprick and light touch right foor  Deep Tendon Reflexes brisk througjout   Plantar response flexor bilaterally    Assessment:       Diagnosis Orders   1.  Dysarthria  MRI Brain W Contrast    MRI Cervical Spine WO Contrast   He has developed slow speech with dysarthria also with gait imbalance and falling to undergo MRI of Head and MRI of cervical spine       Plan:      Orders Placed This Encounter   Procedures    MRI Brain W Contrast     Standing Status:   Future     Standing Expiration Date:   9/17/2020    MRI Cervical Spine WO Contrast     Standing Status:   Future     Standing Expiration Date:   9/17/2020           Earl Bloom MD

## 2019-09-26 ENCOUNTER — HOSPITAL ENCOUNTER (OUTPATIENT)
Dept: MRI IMAGING | Facility: CLINIC | Age: 54
Discharge: HOME | End: 2019-09-28
Payer: COMMERCIAL

## 2019-09-26 DIAGNOSIS — R47.1 DYSARTHRIA: ICD-10-CM

## 2019-09-26 PROCEDURE — 6360000004 HC RX CONTRAST MEDICATION: Performed by: PSYCHIATRY & NEUROLOGY

## 2019-09-26 PROCEDURE — 72141 MRI NECK SPINE W/O DYE: CPT

## 2019-09-26 PROCEDURE — 70553 MRI BRAIN STEM W/O & W/DYE: CPT

## 2019-09-26 PROCEDURE — A9579 GAD-BASE MR CONTRAST NOS,1ML: HCPCS | Performed by: PSYCHIATRY & NEUROLOGY

## 2019-09-26 RX ADMIN — GADOTERIDOL 20 ML: 279.3 INJECTION, SOLUTION INTRAVENOUS at 13:48

## 2019-10-18 ENCOUNTER — HOSPITAL ENCOUNTER (INPATIENT)
Age: 54
LOS: 6 days | Discharge: HOME HEALTH | DRG: 056 | End: 2019-10-24
Attending: EMERGENCY MEDICINE | Admitting: FAMILY MEDICINE
Payer: COMMERCIAL

## 2019-10-18 ENCOUNTER — APPOINTMENT (OUTPATIENT)
Dept: GENERAL RADIOLOGY | Age: 54
DRG: 056 | End: 2019-10-18
Payer: COMMERCIAL

## 2019-10-18 ENCOUNTER — APPOINTMENT (OUTPATIENT)
Dept: CT IMAGING | Age: 54
DRG: 056 | End: 2019-10-18
Payer: COMMERCIAL

## 2019-10-18 DIAGNOSIS — R74.8 CARDIAC ENZYMES ELEVATED: ICD-10-CM

## 2019-10-18 DIAGNOSIS — J96.01 ACUTE RESPIRATORY FAILURE WITH HYPOXIA AND HYPERCAPNIA (HCC): Primary | ICD-10-CM

## 2019-10-18 DIAGNOSIS — J96.02 ACUTE RESPIRATORY FAILURE WITH HYPOXIA AND HYPERCAPNIA (HCC): Primary | ICD-10-CM

## 2019-10-18 DIAGNOSIS — I21.4 NON-STEMI (NON-ST ELEVATED MYOCARDIAL INFARCTION) (HCC): ICD-10-CM

## 2019-10-18 DIAGNOSIS — G70.9 NEUROMUSCULAR DISORDER (HCC): ICD-10-CM

## 2019-10-18 PROBLEM — J96.90 RESPIRATORY FAILURE (HCC): Status: ACTIVE | Noted: 2019-10-18

## 2019-10-18 LAB
% CKMB: 10.5 % (ref 0–3.5)
ABSOLUTE EOS #: 0.09 K/UL (ref 0–0.44)
ABSOLUTE IMMATURE GRANULOCYTE: 0.04 K/UL (ref 0–0.3)
ABSOLUTE LYMPH #: 1.22 K/UL (ref 1.1–3.7)
ABSOLUTE MONO #: 0.6 K/UL (ref 0.1–1.2)
ANION GAP SERPL CALCULATED.3IONS-SCNC: 11 MMOL/L (ref 9–17)
BASOPHILS # BLD: 1 % (ref 0–2)
BASOPHILS ABSOLUTE: 0.05 K/UL (ref 0–0.2)
BUN BLDV-MCNC: 17 MG/DL (ref 6–20)
BUN/CREAT BLD: 24 (ref 9–20)
CALCIUM SERPL-MCNC: 9.4 MG/DL (ref 8.6–10.4)
CHLORIDE BLD-SCNC: 95 MMOL/L (ref 98–107)
CK MB: 17.8 NG/ML
CKMB INTERPRETATION: ABNORMAL
CO2: 36 MMOL/L (ref 20–31)
CREAT SERPL-MCNC: 0.72 MG/DL (ref 0.7–1.2)
D-DIMER QUANTITATIVE: 0.31 MG/L FEU
DIFFERENTIAL TYPE: ABNORMAL
EKG ATRIAL RATE: 79 BPM
EKG ATRIAL RATE: 91 BPM
EKG P AXIS: 57 DEGREES
EKG P AXIS: 64 DEGREES
EKG P-R INTERVAL: 154 MS
EKG P-R INTERVAL: 156 MS
EKG Q-T INTERVAL: 358 MS
EKG Q-T INTERVAL: 380 MS
EKG QRS DURATION: 90 MS
EKG QRS DURATION: 96 MS
EKG QTC CALCULATION (BAZETT): 435 MS
EKG QTC CALCULATION (BAZETT): 440 MS
EKG R AXIS: -40 DEGREES
EKG R AXIS: -47 DEGREES
EKG T AXIS: 25 DEGREES
EKG T AXIS: 37 DEGREES
EKG VENTRICULAR RATE: 79 BPM
EKG VENTRICULAR RATE: 91 BPM
EOSINOPHILS RELATIVE PERCENT: 1 % (ref 1–4)
FIO2: 30
FIO2: 40
FIO2: 5
FOLATE: 10.2 NG/ML
GFR AFRICAN AMERICAN: >60 ML/MIN
GFR NON-AFRICAN AMERICAN: >60 ML/MIN
GFR SERPL CREATININE-BSD FRML MDRD: ABNORMAL ML/MIN/{1.73_M2}
GFR SERPL CREATININE-BSD FRML MDRD: ABNORMAL ML/MIN/{1.73_M2}
GLUCOSE BLD-MCNC: 107 MG/DL (ref 70–99)
HCT VFR BLD CALC: 44.2 % (ref 40.7–50.3)
HEMOGLOBIN: 13.9 G/DL (ref 13–17)
IMMATURE GRANULOCYTES: 1 %
INR BLD: 1
LACTATE DEHYDROGENASE: 198 U/L (ref 135–225)
LV EF: 60 %
LVEF MODALITY: NORMAL
LYMPHOCYTES # BLD: 17 % (ref 24–43)
MCH RBC QN AUTO: 30.2 PG (ref 25.2–33.5)
MCHC RBC AUTO-ENTMCNC: 31.4 G/DL (ref 28.4–34.8)
MCV RBC AUTO: 95.9 FL (ref 82.6–102.9)
MONOCYTES # BLD: 8 % (ref 3–12)
MYOGLOBIN: 117 NG/ML (ref 28–72)
MYOGLOBIN: 143 NG/ML (ref 28–72)
NEGATIVE BASE EXCESS, ART: ABNORMAL (ref 0–2)
NRBC AUTOMATED: 0 PER 100 WBC
O2 DEVICE/FLOW/%: ABNORMAL
PARTIAL THROMBOPLASTIN TIME: 25.1 SEC (ref 23–31)
PARTIAL THROMBOPLASTIN TIME: 37.4 SEC (ref 23–31)
PARTIAL THROMBOPLASTIN TIME: 55 SEC (ref 23–31)
PATIENT TEMP: 37
PATIENT TEMP: 37
PATIENT TEMP: ABNORMAL
PDW BLD-RTO: 13 % (ref 11.8–14.4)
PLATELET # BLD: 175 K/UL (ref 138–453)
PLATELET ESTIMATE: ABNORMAL
PMV BLD AUTO: 10.6 FL (ref 8.1–13.5)
POC HCO3: 37.6 MMOL/L (ref 22–27)
POC HCO3: 38.6 MMOL/L (ref 22–27)
POC HCO3: 39.2 MMOL/L (ref 22–27)
POC O2 SATURATION: 97 %
POC O2 SATURATION: 99 %
POC O2 SATURATION: 99 %
POC PCO2 TEMP: ABNORMAL MM HG
POC PCO2: 73 MM HG (ref 32–45)
POC PCO2: 78 MM HG (ref 32–45)
POC PCO2: 92 MM HG (ref 32–45)
POC PH TEMP: ABNORMAL
POC PH: 7.23 (ref 7.35–7.45)
POC PH: 7.31 (ref 7.35–7.45)
POC PH: 7.32 (ref 7.35–7.45)
POC PO2 TEMP: ABNORMAL MM HG
POC PO2: 147 MM HG (ref 75–95)
POC PO2: 149 MM HG (ref 75–95)
POC PO2: 98 MM HG (ref 75–95)
POSITIVE BASE EXCESS, ART: 11 (ref 0–2)
POSITIVE BASE EXCESS, ART: 12 (ref 0–2)
POSITIVE BASE EXCESS, ART: 13 (ref 0–2)
POTASSIUM SERPL-SCNC: 4.3 MMOL/L (ref 3.7–5.3)
PROTHROMBIN TIME: 10.1 SEC (ref 9.7–11.6)
RBC # BLD: 4.61 M/UL (ref 4.21–5.77)
RBC # BLD: ABNORMAL 10*6/UL
RHEUMATOID FACTOR: <10 IU/ML
SEDIMENTATION RATE, ERYTHROCYTE: 8 MM (ref 0–15)
SEG NEUTROPHILS: 72 % (ref 36–65)
SEGMENTED NEUTROPHILS ABSOLUTE COUNT: 5.12 K/UL (ref 1.5–8.1)
SODIUM BLD-SCNC: 142 MMOL/L (ref 135–144)
TCO2 (CALC), ART: 40 MMOL/L (ref 23–28)
TCO2 (CALC), ART: 41 MMOL/L (ref 23–28)
TCO2 (CALC), ART: 42 MMOL/L (ref 23–28)
TOTAL CK: 119 U/L (ref 39–308)
TOTAL CK: 170 U/L (ref 39–308)
TOTAL CK: 197 U/L (ref 39–308)
TROPONIN INTERP: ABNORMAL
TROPONIN T: ABNORMAL NG/ML
TROPONIN, HIGH SENSITIVITY: 114 NG/L (ref 0–22)
TROPONIN, HIGH SENSITIVITY: 168 NG/L (ref 0–22)
TROPONIN, HIGH SENSITIVITY: 177 NG/L (ref 0–22)
TROPONIN, HIGH SENSITIVITY: 96 NG/L (ref 0–22)
TSH SERPL DL<=0.05 MIU/L-ACNC: 0.46 MIU/L (ref 0.3–5)
VITAMIN B-12: 696 PG/ML (ref 232–1245)
WBC # BLD: 7.1 K/UL (ref 3.5–11.3)
WBC # BLD: ABNORMAL 10*3/UL

## 2019-10-18 PROCEDURE — 82175 ASSAY OF ARSENIC: CPT

## 2019-10-18 PROCEDURE — 82746 ASSAY OF FOLIC ACID SERUM: CPT

## 2019-10-18 PROCEDURE — 6370000000 HC RX 637 (ALT 250 FOR IP): Performed by: FAMILY MEDICINE

## 2019-10-18 PROCEDURE — 86225 DNA ANTIBODY NATIVE: CPT

## 2019-10-18 PROCEDURE — 85025 COMPLETE CBC W/AUTO DIFF WBC: CPT

## 2019-10-18 PROCEDURE — 2700000000 HC OXYGEN THERAPY PER DAY

## 2019-10-18 PROCEDURE — 83655 ASSAY OF LEAD: CPT

## 2019-10-18 PROCEDURE — 86431 RHEUMATOID FACTOR QUANT: CPT

## 2019-10-18 PROCEDURE — 93306 TTE W/DOPPLER COMPLETE: CPT

## 2019-10-18 PROCEDURE — 94644 CONT INHLJ TX 1ST HOUR: CPT

## 2019-10-18 PROCEDURE — 84484 ASSAY OF TROPONIN QUANT: CPT

## 2019-10-18 PROCEDURE — 2500000003 HC RX 250 WO HCPCS: Performed by: EMERGENCY MEDICINE

## 2019-10-18 PROCEDURE — 86038 ANTINUCLEAR ANTIBODIES: CPT

## 2019-10-18 PROCEDURE — 85379 FIBRIN DEGRADATION QUANT: CPT

## 2019-10-18 PROCEDURE — 6360000002 HC RX W HCPCS: Performed by: EMERGENCY MEDICINE

## 2019-10-18 PROCEDURE — 83615 LACTATE (LD) (LDH) ENZYME: CPT

## 2019-10-18 PROCEDURE — 86255 FLUORESCENT ANTIBODY SCREEN: CPT

## 2019-10-18 PROCEDURE — 82300 ASSAY OF CADMIUM: CPT

## 2019-10-18 PROCEDURE — 87040 BLOOD CULTURE FOR BACTERIA: CPT

## 2019-10-18 PROCEDURE — 6360000004 HC RX CONTRAST MEDICATION: Performed by: EMERGENCY MEDICINE

## 2019-10-18 PROCEDURE — 94660 CPAP INITIATION&MGMT: CPT

## 2019-10-18 PROCEDURE — 83519 RIA NONANTIBODY: CPT

## 2019-10-18 PROCEDURE — 80048 BASIC METABOLIC PNL TOTAL CA: CPT

## 2019-10-18 PROCEDURE — 36600 WITHDRAWAL OF ARTERIAL BLOOD: CPT

## 2019-10-18 PROCEDURE — 71045 X-RAY EXAM CHEST 1 VIEW: CPT

## 2019-10-18 PROCEDURE — 96366 THER/PROPH/DIAG IV INF ADDON: CPT

## 2019-10-18 PROCEDURE — 85730 THROMBOPLASTIN TIME PARTIAL: CPT

## 2019-10-18 PROCEDURE — 99223 1ST HOSP IP/OBS HIGH 75: CPT | Performed by: PSYCHIATRY & NEUROLOGY

## 2019-10-18 PROCEDURE — 93005 ELECTROCARDIOGRAM TRACING: CPT | Performed by: EMERGENCY MEDICINE

## 2019-10-18 PROCEDURE — 6360000002 HC RX W HCPCS: Performed by: FAMILY MEDICINE

## 2019-10-18 PROCEDURE — 83516 IMMUNOASSAY NONANTIBODY: CPT

## 2019-10-18 PROCEDURE — 85610 PROTHROMBIN TIME: CPT

## 2019-10-18 PROCEDURE — 96376 TX/PRO/DX INJ SAME DRUG ADON: CPT

## 2019-10-18 PROCEDURE — 83825 ASSAY OF MERCURY: CPT

## 2019-10-18 PROCEDURE — 84443 ASSAY THYROID STIM HORMONE: CPT

## 2019-10-18 PROCEDURE — 2000000000 HC ICU R&B

## 2019-10-18 PROCEDURE — 84630 ASSAY OF ZINC: CPT

## 2019-10-18 PROCEDURE — 83874 ASSAY OF MYOGLOBIN: CPT

## 2019-10-18 PROCEDURE — 96365 THER/PROPH/DIAG IV INF INIT: CPT

## 2019-10-18 PROCEDURE — 82085 ASSAY OF ALDOLASE: CPT

## 2019-10-18 PROCEDURE — 96375 TX/PRO/DX INJ NEW DRUG ADDON: CPT

## 2019-10-18 PROCEDURE — 82550 ASSAY OF CK (CPK): CPT

## 2019-10-18 PROCEDURE — 82607 VITAMIN B-12: CPT

## 2019-10-18 PROCEDURE — 86235 NUCLEAR ANTIGEN ANTIBODY: CPT

## 2019-10-18 PROCEDURE — 6370000000 HC RX 637 (ALT 250 FOR IP): Performed by: EMERGENCY MEDICINE

## 2019-10-18 PROCEDURE — 99285 EMERGENCY DEPT VISIT HI MDM: CPT

## 2019-10-18 PROCEDURE — 36415 COLL VENOUS BLD VENIPUNCTURE: CPT

## 2019-10-18 PROCEDURE — 82525 ASSAY OF COPPER: CPT

## 2019-10-18 PROCEDURE — 2580000003 HC RX 258: Performed by: EMERGENCY MEDICINE

## 2019-10-18 PROCEDURE — 94761 N-INVAS EAR/PLS OXIMETRY MLT: CPT

## 2019-10-18 PROCEDURE — 6360000002 HC RX W HCPCS: Performed by: INTERNAL MEDICINE

## 2019-10-18 PROCEDURE — 82553 CREATINE MB FRACTION: CPT

## 2019-10-18 PROCEDURE — 86618 LYME DISEASE ANTIBODY: CPT

## 2019-10-18 PROCEDURE — 82803 BLOOD GASES ANY COMBINATION: CPT

## 2019-10-18 PROCEDURE — 71260 CT THORAX DX C+: CPT

## 2019-10-18 PROCEDURE — 85651 RBC SED RATE NONAUTOMATED: CPT

## 2019-10-18 RX ORDER — POTASSIUM CHLORIDE 7.45 MG/ML
10 INJECTION INTRAVENOUS PRN
Status: DISCONTINUED | OUTPATIENT
Start: 2019-10-18 | End: 2019-10-18 | Stop reason: SDUPTHER

## 2019-10-18 RX ORDER — POTASSIUM CHLORIDE 20 MEQ/1
40 TABLET, EXTENDED RELEASE ORAL PRN
Status: DISCONTINUED | OUTPATIENT
Start: 2019-10-18 | End: 2019-10-24 | Stop reason: HOSPADM

## 2019-10-18 RX ORDER — SODIUM CHLORIDE 0.9 % (FLUSH) 0.9 %
10 SYRINGE (ML) INJECTION PRN
Status: DISCONTINUED | OUTPATIENT
Start: 2019-10-18 | End: 2019-10-24 | Stop reason: HOSPADM

## 2019-10-18 RX ORDER — MAGNESIUM SULFATE 1 G/100ML
1 INJECTION INTRAVENOUS ONCE
Status: COMPLETED | OUTPATIENT
Start: 2019-10-18 | End: 2019-10-18

## 2019-10-18 RX ORDER — DEXAMETHASONE SODIUM PHOSPHATE 10 MG/ML
10 INJECTION INTRAMUSCULAR; INTRAVENOUS ONCE
Status: COMPLETED | OUTPATIENT
Start: 2019-10-18 | End: 2019-10-18

## 2019-10-18 RX ORDER — 0.9 % SODIUM CHLORIDE 0.9 %
80 INTRAVENOUS SOLUTION INTRAVENOUS ONCE
Status: COMPLETED | OUTPATIENT
Start: 2019-10-18 | End: 2019-10-18

## 2019-10-18 RX ORDER — HEPARIN SODIUM 1000 [USP'U]/ML
4000 INJECTION, SOLUTION INTRAVENOUS; SUBCUTANEOUS PRN
Status: DISCONTINUED | OUTPATIENT
Start: 2019-10-18 | End: 2019-10-21

## 2019-10-18 RX ORDER — MORPHINE SULFATE 2 MG/ML
2 INJECTION, SOLUTION INTRAMUSCULAR; INTRAVENOUS ONCE
Status: COMPLETED | OUTPATIENT
Start: 2019-10-18 | End: 2019-10-18

## 2019-10-18 RX ORDER — SODIUM CHLORIDE 0.9 % (FLUSH) 0.9 %
10 SYRINGE (ML) INJECTION EVERY 12 HOURS SCHEDULED
Status: DISCONTINUED | OUTPATIENT
Start: 2019-10-18 | End: 2019-10-18 | Stop reason: SDUPTHER

## 2019-10-18 RX ORDER — ASPIRIN 81 MG/1
324 TABLET, CHEWABLE ORAL ONCE
Status: COMPLETED | OUTPATIENT
Start: 2019-10-18 | End: 2019-10-18

## 2019-10-18 RX ORDER — NITROGLYCERIN 0.4 MG/1
0.4 TABLET SUBLINGUAL EVERY 5 MIN PRN
Status: DISCONTINUED | OUTPATIENT
Start: 2019-10-18 | End: 2019-10-24 | Stop reason: HOSPADM

## 2019-10-18 RX ORDER — HEPARIN SODIUM 1000 [USP'U]/ML
4000 INJECTION, SOLUTION INTRAVENOUS; SUBCUTANEOUS ONCE
Status: COMPLETED | OUTPATIENT
Start: 2019-10-18 | End: 2019-10-18

## 2019-10-18 RX ORDER — MAGNESIUM SULFATE 1 G/100ML
1 INJECTION INTRAVENOUS PRN
Status: DISCONTINUED | OUTPATIENT
Start: 2019-10-18 | End: 2019-10-24 | Stop reason: HOSPADM

## 2019-10-18 RX ORDER — ATORVASTATIN CALCIUM 40 MG/1
40 TABLET, FILM COATED ORAL NIGHTLY
Status: DISCONTINUED | OUTPATIENT
Start: 2019-10-18 | End: 2019-10-24 | Stop reason: HOSPADM

## 2019-10-18 RX ORDER — HEPARIN SODIUM 1000 [USP'U]/ML
2000 INJECTION, SOLUTION INTRAVENOUS; SUBCUTANEOUS PRN
Status: DISCONTINUED | OUTPATIENT
Start: 2019-10-18 | End: 2019-10-21

## 2019-10-18 RX ORDER — ONDANSETRON 2 MG/ML
4 INJECTION INTRAMUSCULAR; INTRAVENOUS ONCE
Status: COMPLETED | OUTPATIENT
Start: 2019-10-18 | End: 2019-10-18

## 2019-10-18 RX ORDER — SODIUM CHLORIDE 0.9 % (FLUSH) 0.9 %
10 SYRINGE (ML) INJECTION PRN
Status: DISCONTINUED | OUTPATIENT
Start: 2019-10-18 | End: 2019-10-18 | Stop reason: SDUPTHER

## 2019-10-18 RX ORDER — METHYLPREDNISOLONE SODIUM SUCCINATE 125 MG/2ML
125 INJECTION, POWDER, LYOPHILIZED, FOR SOLUTION INTRAMUSCULAR; INTRAVENOUS ONCE
Status: COMPLETED | OUTPATIENT
Start: 2019-10-18 | End: 2019-10-18

## 2019-10-18 RX ORDER — SODIUM CHLORIDE 0.9 % (FLUSH) 0.9 %
10 SYRINGE (ML) INJECTION EVERY 12 HOURS SCHEDULED
Status: DISCONTINUED | OUTPATIENT
Start: 2019-10-18 | End: 2019-10-24 | Stop reason: HOSPADM

## 2019-10-18 RX ORDER — ACETAMINOPHEN 325 MG/1
650 TABLET ORAL EVERY 4 HOURS PRN
Status: DISCONTINUED | OUTPATIENT
Start: 2019-10-18 | End: 2019-10-24 | Stop reason: HOSPADM

## 2019-10-18 RX ORDER — SODIUM CHLORIDE 9 MG/ML
INJECTION, SOLUTION INTRAVENOUS CONTINUOUS
Status: DISCONTINUED | OUTPATIENT
Start: 2019-10-18 | End: 2019-10-22

## 2019-10-18 RX ORDER — POTASSIUM CHLORIDE 7.45 MG/ML
10 INJECTION INTRAVENOUS PRN
Status: DISCONTINUED | OUTPATIENT
Start: 2019-10-18 | End: 2019-10-24 | Stop reason: HOSPADM

## 2019-10-18 RX ORDER — METHYLPREDNISOLONE SODIUM SUCCINATE 125 MG/2ML
60 INJECTION, POWDER, LYOPHILIZED, FOR SOLUTION INTRAMUSCULAR; INTRAVENOUS EVERY 6 HOURS
Status: COMPLETED | OUTPATIENT
Start: 2019-10-18 | End: 2019-10-19

## 2019-10-18 RX ORDER — ONDANSETRON 2 MG/ML
4 INJECTION INTRAMUSCULAR; INTRAVENOUS EVERY 6 HOURS PRN
Status: DISCONTINUED | OUTPATIENT
Start: 2019-10-18 | End: 2019-10-18 | Stop reason: CLARIF

## 2019-10-18 RX ORDER — ONDANSETRON 2 MG/ML
4 INJECTION INTRAMUSCULAR; INTRAVENOUS EVERY 6 HOURS PRN
Status: DISCONTINUED | OUTPATIENT
Start: 2019-10-18 | End: 2019-10-24 | Stop reason: HOSPADM

## 2019-10-18 RX ORDER — HEPARIN SODIUM 10000 [USP'U]/100ML
12 INJECTION, SOLUTION INTRAVENOUS CONTINUOUS
Status: DISCONTINUED | OUTPATIENT
Start: 2019-10-18 | End: 2019-10-20

## 2019-10-18 RX ORDER — ONDANSETRON 4 MG/1
4 TABLET, ORALLY DISINTEGRATING ORAL EVERY 6 HOURS PRN
Status: DISCONTINUED | OUTPATIENT
Start: 2019-10-18 | End: 2019-10-24 | Stop reason: HOSPADM

## 2019-10-18 RX ADMIN — SODIUM CHLORIDE 80 ML: 9 INJECTION, SOLUTION INTRAVENOUS at 05:18

## 2019-10-18 RX ADMIN — FAMOTIDINE 40 MG: 10 INJECTION, SOLUTION INTRAVENOUS at 04:48

## 2019-10-18 RX ADMIN — METHYLPREDNISOLONE SODIUM SUCCINATE 125 MG: 125 INJECTION, POWDER, FOR SOLUTION INTRAMUSCULAR; INTRAVENOUS at 04:48

## 2019-10-18 RX ADMIN — SODIUM CHLORIDE: 9 INJECTION, SOLUTION INTRAVENOUS at 04:55

## 2019-10-18 RX ADMIN — HEPARIN SODIUM 2000 UNITS: 1000 INJECTION INTRAVENOUS; SUBCUTANEOUS at 13:55

## 2019-10-18 RX ADMIN — Medication 10 ML: at 05:18

## 2019-10-18 RX ADMIN — HEPARIN SODIUM AND DEXTROSE 12 UNITS/KG/HR: 10000; 5 INJECTION INTRAVENOUS at 07:24

## 2019-10-18 RX ADMIN — IOPAMIDOL 75 ML: 755 INJECTION, SOLUTION INTRAVENOUS at 05:18

## 2019-10-18 RX ADMIN — DEXAMETHASONE SODIUM PHOSPHATE 10 MG: 10 INJECTION INTRAMUSCULAR; INTRAVENOUS at 04:48

## 2019-10-18 RX ADMIN — METHYLPREDNISOLONE SODIUM SUCCINATE 60 MG: 125 INJECTION, POWDER, FOR SOLUTION INTRAMUSCULAR; INTRAVENOUS at 19:33

## 2019-10-18 RX ADMIN — ONDANSETRON 4 MG: 2 INJECTION INTRAMUSCULAR; INTRAVENOUS at 04:48

## 2019-10-18 RX ADMIN — ATORVASTATIN CALCIUM 40 MG: 40 TABLET, FILM COATED ORAL at 21:17

## 2019-10-18 RX ADMIN — MORPHINE SULFATE 2 MG: 2 INJECTION, SOLUTION INTRAMUSCULAR; INTRAVENOUS at 04:48

## 2019-10-18 RX ADMIN — MAGNESIUM SULFATE HEPTAHYDRATE 1 G: 1 INJECTION, SOLUTION INTRAVENOUS at 06:36

## 2019-10-18 RX ADMIN — ALBUTEROL SULFATE 15 MG: 5 SOLUTION RESPIRATORY (INHALATION) at 05:00

## 2019-10-18 RX ADMIN — HEPARIN SODIUM 4000 UNITS: 1000 INJECTION INTRAVENOUS; SUBCUTANEOUS at 07:24

## 2019-10-18 RX ADMIN — MAGNESIUM SULFATE IN DEXTROSE 1 G: 10 INJECTION, SOLUTION INTRAVENOUS at 05:15

## 2019-10-18 RX ADMIN — METHYLPREDNISOLONE SODIUM SUCCINATE 125 MG: 125 INJECTION, POWDER, FOR SOLUTION INTRAMUSCULAR; INTRAVENOUS at 06:36

## 2019-10-18 RX ADMIN — ASPIRIN 81 MG 324 MG: 81 TABLET ORAL at 06:28

## 2019-10-18 RX ADMIN — METHYLPREDNISOLONE SODIUM SUCCINATE 60 MG: 125 INJECTION, POWDER, FOR SOLUTION INTRAMUSCULAR; INTRAVENOUS at 13:54

## 2019-10-18 ASSESSMENT — PAIN SCALES - GENERAL: PAINLEVEL_OUTOF10: 5

## 2019-10-19 ENCOUNTER — APPOINTMENT (OUTPATIENT)
Dept: GENERAL RADIOLOGY | Age: 54
DRG: 056 | End: 2019-10-19
Payer: COMMERCIAL

## 2019-10-19 ENCOUNTER — APPOINTMENT (OUTPATIENT)
Dept: MRI IMAGING | Age: 54
DRG: 056 | End: 2019-10-19
Payer: COMMERCIAL

## 2019-10-19 PROBLEM — G70.9 NEUROMUSCULAR DISORDER (HCC): Status: ACTIVE | Noted: 2019-10-19

## 2019-10-19 PROBLEM — E43 SEVERE MALNUTRITION (HCC): Chronic | Status: ACTIVE | Noted: 2019-10-19

## 2019-10-19 LAB
ABSOLUTE EOS #: 0 K/UL (ref 0–0.4)
ABSOLUTE IMMATURE GRANULOCYTE: 0 K/UL (ref 0–0.3)
ABSOLUTE LYMPH #: 0.76 K/UL (ref 1–4.8)
ABSOLUTE MONO #: 0.65 K/UL (ref 0.2–0.8)
ALBUMIN SERPL-MCNC: 3.6 G/DL (ref 3.5–5.2)
ALBUMIN/GLOBULIN RATIO: ABNORMAL (ref 1–2.5)
ALP BLD-CCNC: 43 U/L (ref 40–129)
ALT SERPL-CCNC: 17 U/L (ref 5–41)
ANION GAP SERPL CALCULATED.3IONS-SCNC: 5 MMOL/L (ref 9–17)
AST SERPL-CCNC: 16 U/L
ATYPICAL LYMPHOCYTE ABSOLUTE COUNT: 0.11 K/UL
ATYPICAL LYMPHOCYTES: 1 %
BASOPHILS # BLD: 0 %
BASOPHILS ABSOLUTE: 0 K/UL (ref 0–0.2)
BILIRUB SERPL-MCNC: 0.2 MG/DL (ref 0.3–1.2)
BUN BLDV-MCNC: 19 MG/DL (ref 6–20)
BUN/CREAT BLD: 32 (ref 9–20)
CALCIUM SERPL-MCNC: 8.9 MG/DL (ref 8.6–10.4)
CHLORIDE BLD-SCNC: 101 MMOL/L (ref 98–107)
CHOLESTEROL/HDL RATIO: 3.1
CHOLESTEROL: 130 MG/DL
CO2: 37 MMOL/L (ref 20–31)
CREAT SERPL-MCNC: 0.6 MG/DL (ref 0.7–1.2)
DIFFERENTIAL TYPE: ABNORMAL
EKG ATRIAL RATE: 76 BPM
EKG P AXIS: 64 DEGREES
EKG P-R INTERVAL: 150 MS
EKG Q-T INTERVAL: 382 MS
EKG QRS DURATION: 90 MS
EKG QTC CALCULATION (BAZETT): 429 MS
EKG R AXIS: -40 DEGREES
EKG T AXIS: 37 DEGREES
EKG VENTRICULAR RATE: 76 BPM
EOSINOPHILS RELATIVE PERCENT: 0 % (ref 1–4)
FIO2: 36
GFR AFRICAN AMERICAN: >60 ML/MIN
GFR NON-AFRICAN AMERICAN: >60 ML/MIN
GFR SERPL CREATININE-BSD FRML MDRD: ABNORMAL ML/MIN/{1.73_M2}
GFR SERPL CREATININE-BSD FRML MDRD: ABNORMAL ML/MIN/{1.73_M2}
GLUCOSE BLD-MCNC: 162 MG/DL (ref 70–99)
HCT VFR BLD CALC: 41.1 % (ref 40.7–50.3)
HDLC SERPL-MCNC: 42 MG/DL
HEMOGLOBIN: 12.6 G/DL (ref 13–17)
IMMATURE GRANULOCYTES: 0 %
LDL CHOLESTEROL: 75 MG/DL (ref 0–130)
LYMPHOCYTES # BLD: 7 % (ref 24–44)
MAGNESIUM: 2.2 MG/DL (ref 1.6–2.6)
MCH RBC QN AUTO: 29.9 PG (ref 25.2–33.5)
MCHC RBC AUTO-ENTMCNC: 30.7 G/DL (ref 28.4–34.8)
MCV RBC AUTO: 97.6 FL (ref 82.6–102.9)
MONOCYTES # BLD: 6 % (ref 1–7)
NEGATIVE BASE EXCESS, ART: ABNORMAL (ref 0–2)
NRBC AUTOMATED: 0 PER 100 WBC
O2 DEVICE/FLOW/%: ABNORMAL
PARTIAL THROMBOPLASTIN TIME: 45.7 SEC (ref 23–31)
PARTIAL THROMBOPLASTIN TIME: 47.3 SEC (ref 23–31)
PARTIAL THROMBOPLASTIN TIME: 54.6 SEC (ref 23–31)
PARTIAL THROMBOPLASTIN TIME: 62.8 SEC (ref 23–31)
PATIENT TEMP: 37
PDW BLD-RTO: 12.9 % (ref 11.8–14.4)
PLATELET # BLD: 151 K/UL (ref 138–453)
PLATELET ESTIMATE: ABNORMAL
PMV BLD AUTO: 9.8 FL (ref 8.1–13.5)
POC HCO3: 40.9 MMOL/L (ref 22–27)
POC O2 SATURATION: 99 %
POC PCO2 TEMP: ABNORMAL MM HG
POC PCO2: 85 MM HG (ref 32–45)
POC PH TEMP: ABNORMAL
POC PH: 7.29 (ref 7.35–7.45)
POC PO2 TEMP: ABNORMAL MM HG
POC PO2: 147 MM HG (ref 75–95)
POSITIVE BASE EXCESS, ART: 14 (ref 0–2)
POTASSIUM SERPL-SCNC: 4.4 MMOL/L (ref 3.7–5.3)
RBC # BLD: 4.21 M/UL (ref 4.21–5.77)
RBC # BLD: ABNORMAL 10*6/UL
SEG NEUTROPHILS: 86 % (ref 36–66)
SEGMENTED NEUTROPHILS ABSOLUTE COUNT: 9.28 K/UL (ref 1.8–7.7)
SODIUM BLD-SCNC: 143 MMOL/L (ref 135–144)
TCO2 (CALC), ART: 43 MMOL/L (ref 23–28)
TOTAL PROTEIN: 5.7 G/DL (ref 6.4–8.3)
TRIGL SERPL-MCNC: 64 MG/DL
VLDLC SERPL CALC-MCNC: NORMAL MG/DL (ref 1–30)
WBC # BLD: 10.8 K/UL (ref 3.5–11.3)
WBC # BLD: ABNORMAL 10*3/UL

## 2019-10-19 PROCEDURE — 85730 THROMBOPLASTIN TIME PARTIAL: CPT

## 2019-10-19 PROCEDURE — 80061 LIPID PANEL: CPT

## 2019-10-19 PROCEDURE — 82803 BLOOD GASES ANY COMBINATION: CPT

## 2019-10-19 PROCEDURE — 2700000000 HC OXYGEN THERAPY PER DAY

## 2019-10-19 PROCEDURE — 6360000002 HC RX W HCPCS: Performed by: INTERNAL MEDICINE

## 2019-10-19 PROCEDURE — 71045 X-RAY EXAM CHEST 1 VIEW: CPT

## 2019-10-19 PROCEDURE — 6370000000 HC RX 637 (ALT 250 FOR IP): Performed by: FAMILY MEDICINE

## 2019-10-19 PROCEDURE — 2000000000 HC ICU R&B

## 2019-10-19 PROCEDURE — 94660 CPAP INITIATION&MGMT: CPT

## 2019-10-19 PROCEDURE — 85025 COMPLETE CBC W/AUTO DIFF WBC: CPT

## 2019-10-19 PROCEDURE — 94761 N-INVAS EAR/PLS OXIMETRY MLT: CPT

## 2019-10-19 PROCEDURE — 94640 AIRWAY INHALATION TREATMENT: CPT

## 2019-10-19 PROCEDURE — 36600 WITHDRAWAL OF ARTERIAL BLOOD: CPT

## 2019-10-19 PROCEDURE — 80053 COMPREHEN METABOLIC PANEL: CPT

## 2019-10-19 PROCEDURE — 92611 MOTION FLUOROSCOPY/SWALLOW: CPT

## 2019-10-19 PROCEDURE — 6360000002 HC RX W HCPCS: Performed by: FAMILY MEDICINE

## 2019-10-19 PROCEDURE — 74230 X-RAY XM SWLNG FUNCJ C+: CPT

## 2019-10-19 PROCEDURE — 94799 UNLISTED PULMONARY SVC/PX: CPT

## 2019-10-19 PROCEDURE — 2580000003 HC RX 258: Performed by: FAMILY MEDICINE

## 2019-10-19 PROCEDURE — 36415 COLL VENOUS BLD VENIPUNCTURE: CPT

## 2019-10-19 PROCEDURE — 99233 SBSQ HOSP IP/OBS HIGH 50: CPT | Performed by: PSYCHIATRY & NEUROLOGY

## 2019-10-19 PROCEDURE — 93005 ELECTROCARDIOGRAM TRACING: CPT | Performed by: FAMILY MEDICINE

## 2019-10-19 PROCEDURE — 83735 ASSAY OF MAGNESIUM: CPT

## 2019-10-19 RX ORDER — IPRATROPIUM BROMIDE AND ALBUTEROL SULFATE 2.5; .5 MG/3ML; MG/3ML
1 SOLUTION RESPIRATORY (INHALATION)
Status: DISCONTINUED | OUTPATIENT
Start: 2019-10-20 | End: 2019-10-20

## 2019-10-19 RX ORDER — IPRATROPIUM BROMIDE AND ALBUTEROL SULFATE 2.5; .5 MG/3ML; MG/3ML
1 SOLUTION RESPIRATORY (INHALATION)
Status: DISCONTINUED | OUTPATIENT
Start: 2019-10-19 | End: 2019-10-19

## 2019-10-19 RX ADMIN — Medication 10 ML: at 07:15

## 2019-10-19 RX ADMIN — METHYLPREDNISOLONE SODIUM SUCCINATE 60 MG: 125 INJECTION, POWDER, FOR SOLUTION INTRAMUSCULAR; INTRAVENOUS at 00:56

## 2019-10-19 RX ADMIN — ASPIRIN 325 MG: 325 TABLET, DELAYED RELEASE ORAL at 07:14

## 2019-10-19 RX ADMIN — HEPARIN SODIUM 2000 UNITS: 1000 INJECTION INTRAVENOUS; SUBCUTANEOUS at 12:21

## 2019-10-19 RX ADMIN — IPRATROPIUM BROMIDE AND ALBUTEROL SULFATE 1 AMPULE: .5; 3 SOLUTION RESPIRATORY (INHALATION) at 20:17

## 2019-10-19 RX ADMIN — CEFTRIAXONE SODIUM 1 G: 1 INJECTION, POWDER, FOR SOLUTION INTRAMUSCULAR; INTRAVENOUS at 15:00

## 2019-10-19 RX ADMIN — METHYLPREDNISOLONE SODIUM SUCCINATE 60 MG: 125 INJECTION, POWDER, FOR SOLUTION INTRAMUSCULAR; INTRAVENOUS at 07:14

## 2019-10-19 RX ADMIN — ATORVASTATIN CALCIUM 40 MG: 40 TABLET, FILM COATED ORAL at 20:00

## 2019-10-19 RX ADMIN — HEPARIN SODIUM AND DEXTROSE 14 UNITS/KG/HR: 10000; 5 INJECTION INTRAVENOUS at 05:28

## 2019-10-19 RX ADMIN — IPRATROPIUM BROMIDE AND ALBUTEROL SULFATE 1 AMPULE: .5; 3 SOLUTION RESPIRATORY (INHALATION) at 14:12

## 2019-10-19 ASSESSMENT — PAIN SCALES - GENERAL
PAINLEVEL_OUTOF10: 0

## 2019-10-19 ASSESSMENT — PAIN - FUNCTIONAL ASSESSMENT: PAIN_FUNCTIONAL_ASSESSMENT: 0-10

## 2019-10-20 LAB
ALBUMIN SERPL-MCNC: 3.3 G/DL (ref 3.5–5.2)
ALBUMIN/GLOBULIN RATIO: ABNORMAL (ref 1–2.5)
ALDOLASE: 8 U/L (ref 1.5–8.1)
ALP BLD-CCNC: 36 U/L (ref 40–129)
ALT SERPL-CCNC: 17 U/L (ref 5–41)
ANION GAP SERPL CALCULATED.3IONS-SCNC: 6 MMOL/L (ref 9–17)
AST SERPL-CCNC: 11 U/L
BILIRUB SERPL-MCNC: 0.15 MG/DL (ref 0.3–1.2)
BUN BLDV-MCNC: 25 MG/DL (ref 6–20)
BUN/CREAT BLD: 42 (ref 9–20)
CALCIUM SERPL-MCNC: 8.6 MG/DL (ref 8.6–10.4)
CHLORIDE BLD-SCNC: 100 MMOL/L (ref 98–107)
CO2: 34 MMOL/L (ref 20–31)
CREAT SERPL-MCNC: 0.59 MG/DL (ref 0.7–1.2)
FIO2: 24
GFR AFRICAN AMERICAN: >60 ML/MIN
GFR NON-AFRICAN AMERICAN: >60 ML/MIN
GFR SERPL CREATININE-BSD FRML MDRD: ABNORMAL ML/MIN/{1.73_M2}
GFR SERPL CREATININE-BSD FRML MDRD: ABNORMAL ML/MIN/{1.73_M2}
GLUCOSE BLD-MCNC: 104 MG/DL (ref 70–99)
HCT VFR BLD CALC: 38.2 % (ref 40.7–50.3)
HEMOGLOBIN: 11.5 G/DL (ref 13–17)
MAGNESIUM: 2 MG/DL (ref 1.6–2.6)
MCH RBC QN AUTO: 29.8 PG (ref 25.2–33.5)
MCHC RBC AUTO-ENTMCNC: 30.1 G/DL (ref 28.4–34.8)
MCV RBC AUTO: 99 FL (ref 82.6–102.9)
NEGATIVE BASE EXCESS, ART: ABNORMAL (ref 0–2)
NRBC AUTOMATED: 0 PER 100 WBC
O2 DEVICE/FLOW/%: ABNORMAL
PARTIAL THROMBOPLASTIN TIME: 22.9 SEC (ref 23–31)
PARTIAL THROMBOPLASTIN TIME: 44.4 SEC (ref 23–31)
PARTIAL THROMBOPLASTIN TIME: 62 SEC (ref 23–31)
PARTIAL THROMBOPLASTIN TIME: 67.4 SEC (ref 23–31)
PATIENT TEMP: 37
PDW BLD-RTO: 13.1 % (ref 11.8–14.4)
PLATELET # BLD: 109 K/UL (ref 138–453)
PMV BLD AUTO: 10 FL (ref 8.1–13.5)
POC HCO3: 39.1 MMOL/L (ref 22–27)
POC O2 SATURATION: 91 %
POC PCO2 TEMP: ABNORMAL MM HG
POC PCO2: 74 MM HG (ref 32–45)
POC PH TEMP: ABNORMAL
POC PH: 7.33 (ref 7.35–7.45)
POC PO2 TEMP: ABNORMAL MM HG
POC PO2: 70 MM HG (ref 75–95)
POSITIVE BASE EXCESS, ART: 13 (ref 0–2)
POTASSIUM SERPL-SCNC: 4.3 MMOL/L (ref 3.7–5.3)
RBC # BLD: 3.86 M/UL (ref 4.21–5.77)
SODIUM BLD-SCNC: 140 MMOL/L (ref 135–144)
TCO2 (CALC), ART: 41 MMOL/L (ref 23–28)
TOTAL PROTEIN: 5.4 G/DL (ref 6.4–8.3)
WBC # BLD: 9.5 K/UL (ref 3.5–11.3)

## 2019-10-20 PROCEDURE — 82803 BLOOD GASES ANY COMBINATION: CPT

## 2019-10-20 PROCEDURE — 2580000003 HC RX 258: Performed by: FAMILY MEDICINE

## 2019-10-20 PROCEDURE — 85730 THROMBOPLASTIN TIME PARTIAL: CPT

## 2019-10-20 PROCEDURE — 6360000002 HC RX W HCPCS: Performed by: FAMILY MEDICINE

## 2019-10-20 PROCEDURE — 6360000002 HC RX W HCPCS: Performed by: INTERNAL MEDICINE

## 2019-10-20 PROCEDURE — 6370000000 HC RX 637 (ALT 250 FOR IP): Performed by: INTERNAL MEDICINE

## 2019-10-20 PROCEDURE — 2700000000 HC OXYGEN THERAPY PER DAY

## 2019-10-20 PROCEDURE — 36600 WITHDRAWAL OF ARTERIAL BLOOD: CPT

## 2019-10-20 PROCEDURE — 94799 UNLISTED PULMONARY SVC/PX: CPT

## 2019-10-20 PROCEDURE — 6370000000 HC RX 637 (ALT 250 FOR IP): Performed by: FAMILY MEDICINE

## 2019-10-20 PROCEDURE — 36415 COLL VENOUS BLD VENIPUNCTURE: CPT

## 2019-10-20 PROCEDURE — 2000000000 HC ICU R&B

## 2019-10-20 PROCEDURE — 94761 N-INVAS EAR/PLS OXIMETRY MLT: CPT

## 2019-10-20 PROCEDURE — 99233 SBSQ HOSP IP/OBS HIGH 50: CPT | Performed by: PSYCHIATRY & NEUROLOGY

## 2019-10-20 PROCEDURE — 83735 ASSAY OF MAGNESIUM: CPT

## 2019-10-20 PROCEDURE — 85027 COMPLETE CBC AUTOMATED: CPT

## 2019-10-20 PROCEDURE — 80053 COMPREHEN METABOLIC PANEL: CPT

## 2019-10-20 PROCEDURE — 94640 AIRWAY INHALATION TREATMENT: CPT

## 2019-10-20 PROCEDURE — 94660 CPAP INITIATION&MGMT: CPT

## 2019-10-20 RX ORDER — METHYLPREDNISOLONE SODIUM SUCCINATE 125 MG/2ML
60 INJECTION, POWDER, LYOPHILIZED, FOR SOLUTION INTRAMUSCULAR; INTRAVENOUS EVERY 6 HOURS
Status: DISCONTINUED | OUTPATIENT
Start: 2019-10-20 | End: 2019-10-22

## 2019-10-20 RX ORDER — IPRATROPIUM BROMIDE AND ALBUTEROL SULFATE 2.5; .5 MG/3ML; MG/3ML
1 SOLUTION RESPIRATORY (INHALATION) EVERY 4 HOURS
Status: DISCONTINUED | OUTPATIENT
Start: 2019-10-20 | End: 2019-10-23

## 2019-10-20 RX ORDER — BUDESONIDE 0.5 MG/2ML
0.5 INHALANT ORAL 2 TIMES DAILY
Status: DISCONTINUED | OUTPATIENT
Start: 2019-10-20 | End: 2019-10-23

## 2019-10-20 RX ORDER — FORMOTEROL FUMARATE 20 UG/2ML
20 SOLUTION RESPIRATORY (INHALATION) 2 TIMES DAILY
Status: DISCONTINUED | OUTPATIENT
Start: 2019-10-20 | End: 2019-10-23

## 2019-10-20 RX ADMIN — ATORVASTATIN CALCIUM 40 MG: 40 TABLET, FILM COATED ORAL at 21:02

## 2019-10-20 RX ADMIN — ASPIRIN 325 MG: 325 TABLET, DELAYED RELEASE ORAL at 08:52

## 2019-10-20 RX ADMIN — Medication 10 ML: at 08:52

## 2019-10-20 RX ADMIN — FORMOTEROL FUMARATE DIHYDRATE 20 MCG: 20 SOLUTION RESPIRATORY (INHALATION) at 20:16

## 2019-10-20 RX ADMIN — METHYLPREDNISOLONE SODIUM SUCCINATE 60 MG: 125 INJECTION, POWDER, FOR SOLUTION INTRAMUSCULAR; INTRAVENOUS at 11:45

## 2019-10-20 RX ADMIN — SODIUM CHLORIDE: 9 INJECTION, SOLUTION INTRAVENOUS at 11:03

## 2019-10-20 RX ADMIN — IPRATROPIUM BROMIDE AND ALBUTEROL SULFATE 1 AMPULE: .5; 3 SOLUTION RESPIRATORY (INHALATION) at 16:04

## 2019-10-20 RX ADMIN — IPRATROPIUM BROMIDE AND ALBUTEROL SULFATE 1 AMPULE: .5; 3 SOLUTION RESPIRATORY (INHALATION) at 20:04

## 2019-10-20 RX ADMIN — CEFTRIAXONE SODIUM 1 G: 1 INJECTION, POWDER, FOR SOLUTION INTRAMUSCULAR; INTRAVENOUS at 14:25

## 2019-10-20 RX ADMIN — HEPARIN SODIUM AND DEXTROSE 18 UNITS/KG/HR: 10000; 5 INJECTION INTRAVENOUS at 01:10

## 2019-10-20 RX ADMIN — BUDESONIDE 500 MCG: 0.5 SUSPENSION RESPIRATORY (INHALATION) at 20:04

## 2019-10-20 RX ADMIN — IPRATROPIUM BROMIDE AND ALBUTEROL SULFATE 1 AMPULE: .5; 3 SOLUTION RESPIRATORY (INHALATION) at 12:05

## 2019-10-20 RX ADMIN — IPRATROPIUM BROMIDE AND ALBUTEROL SULFATE 1 AMPULE: .5; 3 SOLUTION RESPIRATORY (INHALATION) at 07:55

## 2019-10-20 RX ADMIN — METHYLPREDNISOLONE SODIUM SUCCINATE 60 MG: 125 INJECTION, POWDER, FOR SOLUTION INTRAMUSCULAR; INTRAVENOUS at 18:22

## 2019-10-20 ASSESSMENT — PAIN SCALES - GENERAL
PAINLEVEL_OUTOF10: 0

## 2019-10-21 ENCOUNTER — APPOINTMENT (OUTPATIENT)
Dept: GENERAL RADIOLOGY | Age: 54
DRG: 056 | End: 2019-10-21
Payer: COMMERCIAL

## 2019-10-21 ENCOUNTER — TELEPHONE (OUTPATIENT)
Dept: NEUROLOGY | Age: 54
End: 2019-10-21

## 2019-10-21 LAB
ACETYLCHOL BLOCK AB: 7 % (ref 0–26)
ACETYLCHOLINE BINDING ANTIBODY: 0 NMOL/L (ref 0–0.4)
ALBUMIN SERPL-MCNC: 3.8 G/DL (ref 3.5–5.2)
ALBUMIN/GLOBULIN RATIO: ABNORMAL (ref 1–2.5)
ALP BLD-CCNC: 36 U/L (ref 40–129)
ALT SERPL-CCNC: 19 U/L (ref 5–41)
ANA REFERENCE RANGE:: ABNORMAL
ANION GAP SERPL CALCULATED.3IONS-SCNC: 6 MMOL/L (ref 9–17)
ANTI DNA DOUBLE STRANDED: 11 IU/ML
ANTI JO-1 IGG: 8 U/ML
ANTI RNP AB: 18 U/ML
ANTI SSA: 48 U/ML
ANTI SSB: 14 U/ML
ANTI-CENTROMERE: 3 U/ML
ANTI-NUCLEAR ANTIBODY (ANA): POSITIVE
ANTI-SCLERODERMA: 139 U/ML
ANTI-SMITH: 9 U/ML
AST SERPL-CCNC: 12 U/L
BILIRUB SERPL-MCNC: 0.25 MG/DL (ref 0.3–1.2)
BUN BLDV-MCNC: 14 MG/DL (ref 6–20)
BUN/CREAT BLD: 27 (ref 9–20)
CALCIUM SERPL-MCNC: 9.1 MG/DL (ref 8.6–10.4)
CHLORIDE BLD-SCNC: 99 MMOL/L (ref 98–107)
CO2: 36 MMOL/L (ref 20–31)
CREAT SERPL-MCNC: 0.51 MG/DL (ref 0.7–1.2)
ESTIMATED AVERAGE GLUCOSE: 123 MG/DL
GFR AFRICAN AMERICAN: >60 ML/MIN
GFR NON-AFRICAN AMERICAN: >60 ML/MIN
GFR SERPL CREATININE-BSD FRML MDRD: ABNORMAL ML/MIN/{1.73_M2}
GFR SERPL CREATININE-BSD FRML MDRD: ABNORMAL ML/MIN/{1.73_M2}
GLUCOSE BLD-MCNC: 158 MG/DL (ref 70–99)
HBA1C MFR BLD: 5.9 % (ref 4–6)
HCT VFR BLD CALC: 38.5 % (ref 40.7–50.3)
HEMOGLOBIN: 12 G/DL (ref 13–17)
HISTONE ANTIBODY: 9 U/ML
MAGNESIUM: 2 MG/DL (ref 1.6–2.6)
MCH RBC QN AUTO: 29.9 PG (ref 25.2–33.5)
MCHC RBC AUTO-ENTMCNC: 31.2 G/DL (ref 28.4–34.8)
MCV RBC AUTO: 96 FL (ref 82.6–102.9)
NRBC AUTOMATED: 0 PER 100 WBC
PDW BLD-RTO: 12.8 % (ref 11.8–14.4)
PLATELET # BLD: 131 K/UL (ref 138–453)
PMV BLD AUTO: 9.9 FL (ref 8.1–13.5)
POTASSIUM SERPL-SCNC: 4.8 MMOL/L (ref 3.7–5.3)
RBC # BLD: 4.01 M/UL (ref 4.21–5.77)
SODIUM BLD-SCNC: 141 MMOL/L (ref 135–144)
STRIATED MUSCLE AB, IGG SCREEN: NORMAL
TITIN ANTIBODY: <0.09 IV (ref 0–0.45)
TOTAL PROTEIN: 5.7 G/DL (ref 6.4–8.3)
WBC # BLD: 11.1 K/UL (ref 3.5–11.3)

## 2019-10-21 PROCEDURE — 2580000003 HC RX 258: Performed by: FAMILY MEDICINE

## 2019-10-21 PROCEDURE — 85027 COMPLETE CBC AUTOMATED: CPT

## 2019-10-21 PROCEDURE — 94660 CPAP INITIATION&MGMT: CPT

## 2019-10-21 PROCEDURE — 83735 ASSAY OF MAGNESIUM: CPT

## 2019-10-21 PROCEDURE — 2060000000 HC ICU INTERMEDIATE R&B

## 2019-10-21 PROCEDURE — 99233 SBSQ HOSP IP/OBS HIGH 50: CPT | Performed by: PSYCHIATRY & NEUROLOGY

## 2019-10-21 PROCEDURE — 6360000002 HC RX W HCPCS: Performed by: INTERNAL MEDICINE

## 2019-10-21 PROCEDURE — 94640 AIRWAY INHALATION TREATMENT: CPT

## 2019-10-21 PROCEDURE — 71045 X-RAY EXAM CHEST 1 VIEW: CPT

## 2019-10-21 PROCEDURE — 2700000000 HC OXYGEN THERAPY PER DAY

## 2019-10-21 PROCEDURE — 83036 HEMOGLOBIN GLYCOSYLATED A1C: CPT

## 2019-10-21 PROCEDURE — 80053 COMPREHEN METABOLIC PANEL: CPT

## 2019-10-21 PROCEDURE — 6360000002 HC RX W HCPCS: Performed by: FAMILY MEDICINE

## 2019-10-21 PROCEDURE — 36415 COLL VENOUS BLD VENIPUNCTURE: CPT

## 2019-10-21 PROCEDURE — 6370000000 HC RX 637 (ALT 250 FOR IP): Performed by: INTERNAL MEDICINE

## 2019-10-21 PROCEDURE — 94761 N-INVAS EAR/PLS OXIMETRY MLT: CPT

## 2019-10-21 PROCEDURE — 6370000000 HC RX 637 (ALT 250 FOR IP): Performed by: FAMILY MEDICINE

## 2019-10-21 RX ADMIN — IPRATROPIUM BROMIDE AND ALBUTEROL SULFATE 1 AMPULE: .5; 3 SOLUTION RESPIRATORY (INHALATION) at 14:56

## 2019-10-21 RX ADMIN — IPRATROPIUM BROMIDE AND ALBUTEROL SULFATE 1 AMPULE: .5; 3 SOLUTION RESPIRATORY (INHALATION) at 20:08

## 2019-10-21 RX ADMIN — METHYLPREDNISOLONE SODIUM SUCCINATE 60 MG: 125 INJECTION, POWDER, FOR SOLUTION INTRAMUSCULAR; INTRAVENOUS at 12:19

## 2019-10-21 RX ADMIN — ENOXAPARIN SODIUM 40 MG: 40 INJECTION SUBCUTANEOUS at 09:03

## 2019-10-21 RX ADMIN — SODIUM CHLORIDE: 9 INJECTION, SOLUTION INTRAVENOUS at 10:24

## 2019-10-21 RX ADMIN — IPRATROPIUM BROMIDE AND ALBUTEROL SULFATE 1 AMPULE: .5; 3 SOLUTION RESPIRATORY (INHALATION) at 00:06

## 2019-10-21 RX ADMIN — FORMOTEROL FUMARATE DIHYDRATE 20 MCG: 20 SOLUTION RESPIRATORY (INHALATION) at 20:08

## 2019-10-21 RX ADMIN — FORMOTEROL FUMARATE DIHYDRATE 20 MCG: 20 SOLUTION RESPIRATORY (INHALATION) at 07:42

## 2019-10-21 RX ADMIN — BUDESONIDE 500 MCG: 0.5 SUSPENSION RESPIRATORY (INHALATION) at 07:42

## 2019-10-21 RX ADMIN — CEFTRIAXONE SODIUM 1 G: 1 INJECTION, POWDER, FOR SOLUTION INTRAMUSCULAR; INTRAVENOUS at 15:01

## 2019-10-21 RX ADMIN — IPRATROPIUM BROMIDE AND ALBUTEROL SULFATE 1 AMPULE: .5; 3 SOLUTION RESPIRATORY (INHALATION) at 11:36

## 2019-10-21 RX ADMIN — IPRATROPIUM BROMIDE AND ALBUTEROL SULFATE 1 AMPULE: .5; 3 SOLUTION RESPIRATORY (INHALATION) at 23:26

## 2019-10-21 RX ADMIN — METHYLPREDNISOLONE SODIUM SUCCINATE 60 MG: 125 INJECTION, POWDER, FOR SOLUTION INTRAMUSCULAR; INTRAVENOUS at 18:01

## 2019-10-21 RX ADMIN — METHYLPREDNISOLONE SODIUM SUCCINATE 60 MG: 125 INJECTION, POWDER, FOR SOLUTION INTRAMUSCULAR; INTRAVENOUS at 05:35

## 2019-10-21 RX ADMIN — IPRATROPIUM BROMIDE AND ALBUTEROL SULFATE 1 AMPULE: .5; 3 SOLUTION RESPIRATORY (INHALATION) at 07:42

## 2019-10-21 RX ADMIN — SODIUM CHLORIDE: 9 INJECTION, SOLUTION INTRAVENOUS at 00:19

## 2019-10-21 RX ADMIN — IPRATROPIUM BROMIDE AND ALBUTEROL SULFATE 1 AMPULE: .5; 3 SOLUTION RESPIRATORY (INHALATION) at 03:40

## 2019-10-21 RX ADMIN — BUDESONIDE 500 MCG: 0.5 SUSPENSION RESPIRATORY (INHALATION) at 20:08

## 2019-10-21 RX ADMIN — METHYLPREDNISOLONE SODIUM SUCCINATE 60 MG: 125 INJECTION, POWDER, FOR SOLUTION INTRAMUSCULAR; INTRAVENOUS at 00:12

## 2019-10-21 RX ADMIN — ASPIRIN 325 MG: 325 TABLET, DELAYED RELEASE ORAL at 09:04

## 2019-10-21 RX ADMIN — ATORVASTATIN CALCIUM 40 MG: 40 TABLET, FILM COATED ORAL at 20:11

## 2019-10-22 PROBLEM — J98.11 ATELECTASIS: Status: ACTIVE | Noted: 2019-10-22

## 2019-10-22 LAB
ANION GAP SERPL CALCULATED.3IONS-SCNC: 8 MMOL/L (ref 9–17)
BUN BLDV-MCNC: 18 MG/DL (ref 6–20)
BUN/CREAT BLD: 32 (ref 9–20)
CALCIUM SERPL-MCNC: 8.9 MG/DL (ref 8.6–10.4)
CHLORIDE BLD-SCNC: 99 MMOL/L (ref 98–107)
CO2: 36 MMOL/L (ref 20–31)
CREAT SERPL-MCNC: 0.57 MG/DL (ref 0.7–1.2)
GFR AFRICAN AMERICAN: >60 ML/MIN
GFR NON-AFRICAN AMERICAN: >60 ML/MIN
GFR SERPL CREATININE-BSD FRML MDRD: ABNORMAL ML/MIN/{1.73_M2}
GFR SERPL CREATININE-BSD FRML MDRD: ABNORMAL ML/MIN/{1.73_M2}
GLUCOSE BLD-MCNC: 142 MG/DL (ref 70–99)
HCT VFR BLD CALC: 38.3 % (ref 40.7–50.3)
HEMOGLOBIN: 11.9 G/DL (ref 13–17)
LYME ANTIBODY: 0.45
MAGNESIUM: 2.1 MG/DL (ref 1.6–2.6)
MCH RBC QN AUTO: 29.8 PG (ref 25.2–33.5)
MCHC RBC AUTO-ENTMCNC: 31.1 G/DL (ref 28.4–34.8)
MCV RBC AUTO: 95.8 FL (ref 82.6–102.9)
MISCELLANEOUS LAB TEST RESULT: NORMAL
NRBC AUTOMATED: 0 PER 100 WBC
PDW BLD-RTO: 13.1 % (ref 11.8–14.4)
PLATELET # BLD: 122 K/UL (ref 138–453)
PMV BLD AUTO: 10.3 FL (ref 8.1–13.5)
POTASSIUM SERPL-SCNC: 4.8 MMOL/L (ref 3.7–5.3)
RBC # BLD: 4 M/UL (ref 4.21–5.77)
SODIUM BLD-SCNC: 143 MMOL/L (ref 135–144)
TEST NAME: NORMAL
WBC # BLD: 12.6 K/UL (ref 3.5–11.3)

## 2019-10-22 PROCEDURE — 80048 BASIC METABOLIC PNL TOTAL CA: CPT

## 2019-10-22 PROCEDURE — 6370000000 HC RX 637 (ALT 250 FOR IP): Performed by: PSYCHIATRY & NEUROLOGY

## 2019-10-22 PROCEDURE — 6360000002 HC RX W HCPCS: Performed by: INTERNAL MEDICINE

## 2019-10-22 PROCEDURE — 6370000000 HC RX 637 (ALT 250 FOR IP): Performed by: INTERNAL MEDICINE

## 2019-10-22 PROCEDURE — 94799 UNLISTED PULMONARY SVC/PX: CPT

## 2019-10-22 PROCEDURE — 94729 DIFFUSING CAPACITY: CPT

## 2019-10-22 PROCEDURE — 6360000002 HC RX W HCPCS: Performed by: FAMILY MEDICINE

## 2019-10-22 PROCEDURE — 83735 ASSAY OF MAGNESIUM: CPT

## 2019-10-22 PROCEDURE — 97110 THERAPEUTIC EXERCISES: CPT

## 2019-10-22 PROCEDURE — 99233 SBSQ HOSP IP/OBS HIGH 50: CPT | Performed by: PSYCHIATRY & NEUROLOGY

## 2019-10-22 PROCEDURE — 2580000003 HC RX 258: Performed by: FAMILY MEDICINE

## 2019-10-22 PROCEDURE — 97116 GAIT TRAINING THERAPY: CPT

## 2019-10-22 PROCEDURE — 2060000000 HC ICU INTERMEDIATE R&B

## 2019-10-22 PROCEDURE — 94727 GAS DIL/WSHOT DETER LNG VOL: CPT

## 2019-10-22 PROCEDURE — 94664 DEMO&/EVAL PT USE INHALER: CPT

## 2019-10-22 PROCEDURE — 6370000000 HC RX 637 (ALT 250 FOR IP): Performed by: FAMILY MEDICINE

## 2019-10-22 PROCEDURE — 94640 AIRWAY INHALATION TREATMENT: CPT

## 2019-10-22 PROCEDURE — 36415 COLL VENOUS BLD VENIPUNCTURE: CPT

## 2019-10-22 PROCEDURE — 97166 OT EVAL MOD COMPLEX 45 MIN: CPT

## 2019-10-22 PROCEDURE — 2700000000 HC OXYGEN THERAPY PER DAY

## 2019-10-22 PROCEDURE — 94761 N-INVAS EAR/PLS OXIMETRY MLT: CPT

## 2019-10-22 PROCEDURE — 94060 EVALUATION OF WHEEZING: CPT

## 2019-10-22 PROCEDURE — 94660 CPAP INITIATION&MGMT: CPT

## 2019-10-22 PROCEDURE — 92526 ORAL FUNCTION THERAPY: CPT

## 2019-10-22 PROCEDURE — 97163 PT EVAL HIGH COMPLEX 45 MIN: CPT

## 2019-10-22 PROCEDURE — 85027 COMPLETE CBC AUTOMATED: CPT

## 2019-10-22 PROCEDURE — 97535 SELF CARE MNGMENT TRAINING: CPT

## 2019-10-22 RX ORDER — METHYLPREDNISOLONE SODIUM SUCCINATE 40 MG/ML
40 INJECTION, POWDER, LYOPHILIZED, FOR SOLUTION INTRAMUSCULAR; INTRAVENOUS EVERY 8 HOURS
Status: DISCONTINUED | OUTPATIENT
Start: 2019-10-22 | End: 2019-10-23

## 2019-10-22 RX ORDER — ALBUTEROL SULFATE 90 UG/1
2 AEROSOL, METERED RESPIRATORY (INHALATION) ONCE
Status: COMPLETED | OUTPATIENT
Start: 2019-10-22 | End: 2019-10-22

## 2019-10-22 RX ADMIN — FORMOTEROL FUMARATE DIHYDRATE 20 MCG: 20 SOLUTION RESPIRATORY (INHALATION) at 07:43

## 2019-10-22 RX ADMIN — METHYLPREDNISOLONE SODIUM SUCCINATE 40 MG: 40 INJECTION, POWDER, FOR SOLUTION INTRAMUSCULAR; INTRAVENOUS at 13:43

## 2019-10-22 RX ADMIN — ASPIRIN 325 MG: 325 TABLET, DELAYED RELEASE ORAL at 08:26

## 2019-10-22 RX ADMIN — IPRATROPIUM BROMIDE AND ALBUTEROL SULFATE 1 AMPULE: .5; 3 SOLUTION RESPIRATORY (INHALATION) at 04:19

## 2019-10-22 RX ADMIN — ENOXAPARIN SODIUM 40 MG: 40 INJECTION SUBCUTANEOUS at 08:27

## 2019-10-22 RX ADMIN — IPRATROPIUM BROMIDE AND ALBUTEROL SULFATE 1 AMPULE: .5; 3 SOLUTION RESPIRATORY (INHALATION) at 11:55

## 2019-10-22 RX ADMIN — Medication 10 ML: at 21:31

## 2019-10-22 RX ADMIN — IPRATROPIUM BROMIDE AND ALBUTEROL SULFATE 1 AMPULE: .5; 3 SOLUTION RESPIRATORY (INHALATION) at 20:52

## 2019-10-22 RX ADMIN — METHYLPREDNISOLONE SODIUM SUCCINATE 60 MG: 125 INJECTION, POWDER, FOR SOLUTION INTRAMUSCULAR; INTRAVENOUS at 00:29

## 2019-10-22 RX ADMIN — METHYLPREDNISOLONE SODIUM SUCCINATE 60 MG: 125 INJECTION, POWDER, FOR SOLUTION INTRAMUSCULAR; INTRAVENOUS at 05:08

## 2019-10-22 RX ADMIN — METHYLPREDNISOLONE SODIUM SUCCINATE 40 MG: 40 INJECTION, POWDER, FOR SOLUTION INTRAMUSCULAR; INTRAVENOUS at 21:31

## 2019-10-22 RX ADMIN — BUDESONIDE 500 MCG: 0.5 SUSPENSION RESPIRATORY (INHALATION) at 07:43

## 2019-10-22 RX ADMIN — SODIUM CHLORIDE: 9 INJECTION, SOLUTION INTRAVENOUS at 05:08

## 2019-10-22 RX ADMIN — IPRATROPIUM BROMIDE AND ALBUTEROL SULFATE 1 AMPULE: .5; 3 SOLUTION RESPIRATORY (INHALATION) at 07:43

## 2019-10-22 RX ADMIN — FORMOTEROL FUMARATE DIHYDRATE 20 MCG: 20 SOLUTION RESPIRATORY (INHALATION) at 20:51

## 2019-10-22 RX ADMIN — BUDESONIDE 500 MCG: 0.5 SUSPENSION RESPIRATORY (INHALATION) at 20:51

## 2019-10-22 RX ADMIN — ATORVASTATIN CALCIUM 40 MG: 40 TABLET, FILM COATED ORAL at 21:31

## 2019-10-22 RX ADMIN — ALBUTEROL SULFATE 2 PUFF: 90 AEROSOL, METERED RESPIRATORY (INHALATION) at 16:19

## 2019-10-23 LAB
ANION GAP SERPL CALCULATED.3IONS-SCNC: 6 MMOL/L (ref 9–17)
BUN BLDV-MCNC: 21 MG/DL (ref 6–20)
BUN/CREAT BLD: 38 (ref 9–20)
CALCIUM SERPL-MCNC: 8.9 MG/DL (ref 8.6–10.4)
CHLORIDE BLD-SCNC: 100 MMOL/L (ref 98–107)
CO2: 36 MMOL/L (ref 20–31)
CREAT SERPL-MCNC: 0.56 MG/DL (ref 0.7–1.2)
GFR AFRICAN AMERICAN: >60 ML/MIN
GFR NON-AFRICAN AMERICAN: >60 ML/MIN
GFR SERPL CREATININE-BSD FRML MDRD: ABNORMAL ML/MIN/{1.73_M2}
GFR SERPL CREATININE-BSD FRML MDRD: ABNORMAL ML/MIN/{1.73_M2}
GLUCOSE BLD-MCNC: 122 MG/DL (ref 70–99)
HCT VFR BLD CALC: 41.9 % (ref 40.7–50.3)
HEMOGLOBIN: 13.2 G/DL (ref 13–17)
MAGNESIUM: 2.2 MG/DL (ref 1.6–2.6)
MCH RBC QN AUTO: 30.1 PG (ref 25.2–33.5)
MCHC RBC AUTO-ENTMCNC: 31.5 G/DL (ref 28.4–34.8)
MCV RBC AUTO: 95.7 FL (ref 82.6–102.9)
NRBC AUTOMATED: 0 PER 100 WBC
PDW BLD-RTO: 13.2 % (ref 11.8–14.4)
PLATELET # BLD: 128 K/UL (ref 138–453)
PMV BLD AUTO: 9.8 FL (ref 8.1–13.5)
POTASSIUM SERPL-SCNC: 4.9 MMOL/L (ref 3.7–5.3)
RBC # BLD: 4.38 M/UL (ref 4.21–5.77)
SODIUM BLD-SCNC: 142 MMOL/L (ref 135–144)
WBC # BLD: 15.1 K/UL (ref 3.5–11.3)

## 2019-10-23 PROCEDURE — 6370000000 HC RX 637 (ALT 250 FOR IP): Performed by: NURSE PRACTITIONER

## 2019-10-23 PROCEDURE — 97116 GAIT TRAINING THERAPY: CPT

## 2019-10-23 PROCEDURE — 6360000002 HC RX W HCPCS: Performed by: INTERNAL MEDICINE

## 2019-10-23 PROCEDURE — 2060000000 HC ICU INTERMEDIATE R&B

## 2019-10-23 PROCEDURE — 85027 COMPLETE CBC AUTOMATED: CPT

## 2019-10-23 PROCEDURE — 6370000000 HC RX 637 (ALT 250 FOR IP): Performed by: FAMILY MEDICINE

## 2019-10-23 PROCEDURE — 80048 BASIC METABOLIC PNL TOTAL CA: CPT

## 2019-10-23 PROCEDURE — 2580000003 HC RX 258: Performed by: FAMILY MEDICINE

## 2019-10-23 PROCEDURE — 83735 ASSAY OF MAGNESIUM: CPT

## 2019-10-23 PROCEDURE — 99233 SBSQ HOSP IP/OBS HIGH 50: CPT | Performed by: PSYCHIATRY & NEUROLOGY

## 2019-10-23 PROCEDURE — 36415 COLL VENOUS BLD VENIPUNCTURE: CPT

## 2019-10-23 PROCEDURE — 6360000002 HC RX W HCPCS: Performed by: FAMILY MEDICINE

## 2019-10-23 PROCEDURE — 94799 UNLISTED PULMONARY SVC/PX: CPT

## 2019-10-23 PROCEDURE — 94762 N-INVAS EAR/PLS OXIMTRY CONT: CPT

## 2019-10-23 PROCEDURE — 97110 THERAPEUTIC EXERCISES: CPT

## 2019-10-23 PROCEDURE — 94640 AIRWAY INHALATION TREATMENT: CPT

## 2019-10-23 PROCEDURE — 6370000000 HC RX 637 (ALT 250 FOR IP): Performed by: INTERNAL MEDICINE

## 2019-10-23 RX ORDER — IPRATROPIUM BROMIDE AND ALBUTEROL SULFATE 2.5; .5 MG/3ML; MG/3ML
1 SOLUTION RESPIRATORY (INHALATION)
Status: DISCONTINUED | OUTPATIENT
Start: 2019-10-23 | End: 2019-10-24 | Stop reason: HOSPADM

## 2019-10-23 RX ORDER — ALBUTEROL SULFATE 2.5 MG/3ML
2.5 SOLUTION RESPIRATORY (INHALATION)
Status: DISCONTINUED | OUTPATIENT
Start: 2019-10-23 | End: 2019-10-24 | Stop reason: HOSPADM

## 2019-10-23 RX ORDER — PREDNISONE 20 MG/1
20 TABLET ORAL 2 TIMES DAILY
Status: DISCONTINUED | OUTPATIENT
Start: 2019-10-23 | End: 2019-10-24 | Stop reason: HOSPADM

## 2019-10-23 RX ADMIN — IPRATROPIUM BROMIDE AND ALBUTEROL SULFATE 1 AMPULE: .5; 3 SOLUTION RESPIRATORY (INHALATION) at 07:20

## 2019-10-23 RX ADMIN — ATORVASTATIN CALCIUM 40 MG: 40 TABLET, FILM COATED ORAL at 20:38

## 2019-10-23 RX ADMIN — IPRATROPIUM BROMIDE AND ALBUTEROL SULFATE 1 AMPULE: .5; 3 SOLUTION RESPIRATORY (INHALATION) at 11:34

## 2019-10-23 RX ADMIN — ENOXAPARIN SODIUM 40 MG: 40 INJECTION SUBCUTANEOUS at 09:19

## 2019-10-23 RX ADMIN — PREDNISONE 20 MG: 20 TABLET ORAL at 20:38

## 2019-10-23 RX ADMIN — BUDESONIDE 500 MCG: 0.5 SUSPENSION RESPIRATORY (INHALATION) at 07:20

## 2019-10-23 RX ADMIN — ASPIRIN 325 MG: 325 TABLET, DELAYED RELEASE ORAL at 09:19

## 2019-10-23 RX ADMIN — IPRATROPIUM BROMIDE AND ALBUTEROL SULFATE 1 AMPULE: .5; 3 SOLUTION RESPIRATORY (INHALATION) at 18:34

## 2019-10-23 RX ADMIN — Medication 10 ML: at 11:14

## 2019-10-23 RX ADMIN — METHYLPREDNISOLONE SODIUM SUCCINATE 40 MG: 40 INJECTION, POWDER, FOR SOLUTION INTRAMUSCULAR; INTRAVENOUS at 05:54

## 2019-10-23 RX ADMIN — FORMOTEROL FUMARATE DIHYDRATE 20 MCG: 20 SOLUTION RESPIRATORY (INHALATION) at 07:37

## 2019-10-23 RX ADMIN — METHYLPREDNISOLONE SODIUM SUCCINATE 40 MG: 40 INJECTION, POWDER, FOR SOLUTION INTRAMUSCULAR; INTRAVENOUS at 13:40

## 2019-10-23 RX ADMIN — Medication 10 ML: at 20:38

## 2019-10-23 ASSESSMENT — PAIN SCALES - GENERAL
PAINLEVEL_OUTOF10: 0
PAINLEVEL_OUTOF10: 0

## 2019-10-24 VITALS
DIASTOLIC BLOOD PRESSURE: 77 MMHG | HEIGHT: 70 IN | TEMPERATURE: 97.5 F | WEIGHT: 160.9 LBS | OXYGEN SATURATION: 93 % | SYSTOLIC BLOOD PRESSURE: 133 MMHG | RESPIRATION RATE: 18 BRPM | BODY MASS INDEX: 23.03 KG/M2 | HEART RATE: 93 BPM

## 2019-10-24 LAB
ANION GAP SERPL CALCULATED.3IONS-SCNC: 10 MMOL/L (ref 9–17)
BUN BLDV-MCNC: 25 MG/DL (ref 6–20)
BUN/CREAT BLD: 47 (ref 9–20)
CALCIUM SERPL-MCNC: 9.1 MG/DL (ref 8.6–10.4)
CHLORIDE BLD-SCNC: 98 MMOL/L (ref 98–107)
CO2: 31 MMOL/L (ref 20–31)
CREAT SERPL-MCNC: 0.53 MG/DL (ref 0.7–1.2)
CULTURE: NORMAL
CULTURE: NORMAL
DLCO %PRED: 28 %
DLCO PRED: NORMAL ML/MIN/MMHG
DLCO/VA %PRED: NORMAL %
DLCO/VA PRED: NORMAL ML/MIN/MMHG
DLCO/VA: NORMAL ML/MIN/MMHG
DLCO: NORMAL ML/MIN/MMHG
EXPIRATORY TIME: NORMAL SEC
FEF 25-75% %PRED-PRE: NORMAL L/SEC
FEF 25-75% PRED: NORMAL L/SEC
FEF 25-75%-PRE: NORMAL L/SEC
FEV1 %PRED-PRE: 35 %
FEV1 PRED: NORMAL L
FEV1/FVC %PRED-PRE: NORMAL %
FEV1/FVC PRED: NORMAL %
FEV1/FVC: 76 %
FEV1: NORMAL L
FVC %PRED-PRE: NORMAL %
FVC PRED: NORMAL L
FVC: NORMAL L
GAW %PRED: NORMAL %
GAW PRED: NORMAL L/S/CMH2O
GAW: NORMAL L/S/CMH2O
GFR AFRICAN AMERICAN: >60 ML/MIN
GFR NON-AFRICAN AMERICAN: >60 ML/MIN
GFR SERPL CREATININE-BSD FRML MDRD: ABNORMAL ML/MIN/{1.73_M2}
GFR SERPL CREATININE-BSD FRML MDRD: ABNORMAL ML/MIN/{1.73_M2}
GLUCOSE BLD-MCNC: 116 MG/DL (ref 70–99)
HCT VFR BLD CALC: 41.8 % (ref 40.7–50.3)
HEMOGLOBIN: 13.3 G/DL (ref 13–17)
IC %PRED: NORMAL %
IC PRED: NORMAL L
IC: NORMAL L
Lab: NORMAL
Lab: NORMAL
MAGNESIUM: 2.2 MG/DL (ref 1.6–2.6)
MCH RBC QN AUTO: 29.6 PG (ref 25.2–33.5)
MCHC RBC AUTO-ENTMCNC: 31.8 G/DL (ref 28.4–34.8)
MCV RBC AUTO: 93.1 FL (ref 82.6–102.9)
MISCELLANEOUS LAB TEST RESULT: NORMAL
MVV %PRED-PRE: NORMAL %
MVV PRED: NORMAL L/MIN
MVV-PRE: NORMAL L/MIN
NRBC AUTOMATED: 0 PER 100 WBC
PDW BLD-RTO: 13.2 % (ref 11.8–14.4)
PEF %PRED-PRE: NORMAL L/SEC
PEF PRED: NORMAL L/SEC
PEF-PRE: NORMAL L/SEC
PLATELET # BLD: 138 K/UL (ref 138–453)
PMV BLD AUTO: 9.9 FL (ref 8.1–13.5)
POTASSIUM SERPL-SCNC: 4.7 MMOL/L (ref 3.7–5.3)
RAW %PRED: NORMAL %
RAW PRED: NORMAL CMH2O/L/S
RAW: NORMAL CMH2O/L/S
RBC # BLD: 4.49 M/UL (ref 4.21–5.77)
RV %PRED: NORMAL %
RV PRED: NORMAL L
RV: NORMAL L
SODIUM BLD-SCNC: 139 MMOL/L (ref 135–144)
SPECIMEN DESCRIPTION: NORMAL
SPECIMEN DESCRIPTION: NORMAL
SVC %PRED: NORMAL %
SVC PRED: NORMAL L
SVC: NORMAL L
TEST NAME: NORMAL
TLC %PRED: 71 %
TLC PRED: NORMAL L
TLC: NORMAL L
VA %PRED: NORMAL %
VA PRED: NORMAL L
VA: NORMAL L
VTG %PRED: NORMAL %
VTG PRED: NORMAL L
VTG: NORMAL L
WBC # BLD: 14.2 K/UL (ref 3.5–11.3)

## 2019-10-24 PROCEDURE — 94760 N-INVAS EAR/PLS OXIMETRY 1: CPT

## 2019-10-24 PROCEDURE — 85027 COMPLETE CBC AUTOMATED: CPT

## 2019-10-24 PROCEDURE — 99232 SBSQ HOSP IP/OBS MODERATE 35: CPT | Performed by: PSYCHIATRY & NEUROLOGY

## 2019-10-24 PROCEDURE — 2580000003 HC RX 258: Performed by: FAMILY MEDICINE

## 2019-10-24 PROCEDURE — 97535 SELF CARE MNGMENT TRAINING: CPT

## 2019-10-24 PROCEDURE — 6360000002 HC RX W HCPCS: Performed by: FAMILY MEDICINE

## 2019-10-24 PROCEDURE — 6370000000 HC RX 637 (ALT 250 FOR IP): Performed by: NURSE PRACTITIONER

## 2019-10-24 PROCEDURE — 97530 THERAPEUTIC ACTIVITIES: CPT

## 2019-10-24 PROCEDURE — 6370000000 HC RX 637 (ALT 250 FOR IP): Performed by: FAMILY MEDICINE

## 2019-10-24 PROCEDURE — 94799 UNLISTED PULMONARY SVC/PX: CPT

## 2019-10-24 PROCEDURE — 80048 BASIC METABOLIC PNL TOTAL CA: CPT

## 2019-10-24 PROCEDURE — 94640 AIRWAY INHALATION TREATMENT: CPT

## 2019-10-24 PROCEDURE — 36415 COLL VENOUS BLD VENIPUNCTURE: CPT

## 2019-10-24 PROCEDURE — 83735 ASSAY OF MAGNESIUM: CPT

## 2019-10-24 PROCEDURE — 94660 CPAP INITIATION&MGMT: CPT

## 2019-10-24 PROCEDURE — 97116 GAIT TRAINING THERAPY: CPT

## 2019-10-24 RX ORDER — PREDNISONE 20 MG/1
TABLET ORAL
Qty: 9 TABLET | Refills: 0 | Status: SHIPPED | OUTPATIENT
Start: 2019-10-24 | End: 2019-11-02

## 2019-10-24 RX ADMIN — Medication 10 ML: at 08:23

## 2019-10-24 RX ADMIN — PREDNISONE 20 MG: 20 TABLET ORAL at 08:23

## 2019-10-24 RX ADMIN — IPRATROPIUM BROMIDE AND ALBUTEROL SULFATE 1 AMPULE: .5; 3 SOLUTION RESPIRATORY (INHALATION) at 09:46

## 2019-10-24 RX ADMIN — ASPIRIN 325 MG: 325 TABLET, DELAYED RELEASE ORAL at 08:23

## 2019-10-24 RX ADMIN — ENOXAPARIN SODIUM 40 MG: 40 INJECTION SUBCUTANEOUS at 08:23

## 2019-10-24 ASSESSMENT — PULMONARY FUNCTION TESTS
FEV1/FVC: 76
FEV1_PERCENT_PREDICTED_PRE: 35

## 2019-10-25 LAB
ARSENIC, URINE /24 HR: ABNORMAL UG/D (ref 0–49.9)
ARSENIC, URINE /VOLUME: <10 UG/L (ref 0–34.9)
ARSENIC, URINE RATIO CREATININE: ABNORMAL UG/G CRT (ref 0–29.9)
CADMIUM, URINE /24 HR: ABNORMAL UG/D (ref 0–3.2)
CADMIUM, URINE /VOLUME: <1 UG/L (ref 0–1)
CADMIUM, URINE RATIO CREATININE: ABNORMAL UG/G CRT (ref 0–3.2)
COPPER, URINE /24 HR: ABNORMAL UG/D (ref 3–45)
COPPER, URINE /VOLUME: 3.3 UG/DL (ref 0.3–3.2)
COPPER, URINE RATIO CREATININE: 26.2 UG/G CRT (ref 10–45)
CREATININE URINE /24 HR: ABNORMAL MG/D (ref 800–2100)
CREATININE URINE /VOLUME: 126 MG/DL
HOURS COLLECTED: ABNORMAL
LEAD, URINE /24 HR: ABNORMAL UG/D (ref 0–8.1)
LEAD, URINE /VOLUME: <5 UG/L (ref 0–5)
LEAD, URINE RATIO CREATININE: ABNORMAL UG/G CRT (ref 0–5)
MERCURY, URINE /24 HR: ABNORMAL UG/D (ref 0–20)
MERCURY, URINE /G CRT: ABNORMAL UG/G CRT (ref 0–20)
MERCURY, URINE /VOLUME: <2.5 UG/L (ref 0–5)
MISCELLANEOUS LAB TEST RESULT: NORMAL
TEST NAME: NORMAL
URINE VOLUME: 65
ZINC, URINE /24 HR: ABNORMAL UG/D (ref 150–1200)
ZINC, URINE /VOLUME: 199.4 UG/DL (ref 15–120)
ZINC, URINE RATIO CREATININE: 1582.5 UG/G CRT (ref 110–750)

## 2019-11-04 ENCOUNTER — OFFICE VISIT (OUTPATIENT)
Dept: NEUROLOGY | Age: 54
End: 2019-11-04
Payer: COMMERCIAL

## 2019-11-04 VITALS
WEIGHT: 148.6 LBS | DIASTOLIC BLOOD PRESSURE: 71 MMHG | HEART RATE: 97 BPM | BODY MASS INDEX: 21.27 KG/M2 | SYSTOLIC BLOOD PRESSURE: 104 MMHG | HEIGHT: 70 IN

## 2019-11-04 DIAGNOSIS — R29.898 WEAKNESS OF LOWER EXTREMITY, UNSPECIFIED LATERALITY: ICD-10-CM

## 2019-11-04 DIAGNOSIS — R47.1 DYSARTHRIA: Primary | ICD-10-CM

## 2019-11-04 PROCEDURE — 99214 OFFICE O/P EST MOD 30 MIN: CPT | Performed by: PSYCHIATRY & NEUROLOGY

## 2019-11-04 ASSESSMENT — ENCOUNTER SYMPTOMS
EYE DISCHARGE: 1
GASTROINTESTINAL NEGATIVE: 1
SHORTNESS OF BREATH: 1
BACK PAIN: 1

## 2019-11-10 PROBLEM — J40 TRACHEOBRONCHITIS: Status: ACTIVE | Noted: 2019-11-10

## 2019-12-06 ENCOUNTER — OFFICE VISIT (OUTPATIENT)
Dept: NEUROLOGY | Age: 54
End: 2019-12-06
Payer: COMMERCIAL

## 2019-12-06 VITALS
DIASTOLIC BLOOD PRESSURE: 81 MMHG | BODY MASS INDEX: 21.33 KG/M2 | HEART RATE: 84 BPM | SYSTOLIC BLOOD PRESSURE: 122 MMHG | HEIGHT: 70 IN | WEIGHT: 149 LBS

## 2019-12-06 DIAGNOSIS — G12.21 ALS (AMYOTROPHIC LATERAL SCLEROSIS) (HCC): ICD-10-CM

## 2019-12-06 DIAGNOSIS — G12.20 MOTOR NEURON DISEASE (HCC): Primary | ICD-10-CM

## 2019-12-06 PROCEDURE — 99214 OFFICE O/P EST MOD 30 MIN: CPT | Performed by: PSYCHIATRY & NEUROLOGY

## 2019-12-06 ASSESSMENT — ENCOUNTER SYMPTOMS
SHORTNESS OF BREATH: 1
GASTROINTESTINAL NEGATIVE: 1
BACK PAIN: 1
ALLERGIC/IMMUNOLOGIC NEGATIVE: 1
EYES NEGATIVE: 1

## 2019-12-23 ENCOUNTER — TELEPHONE (OUTPATIENT)
Dept: NEUROLOGY | Age: 54
End: 2019-12-23

## 2019-12-23 DIAGNOSIS — G12.21 ALS (AMYOTROPHIC LATERAL SCLEROSIS) (HCC): ICD-10-CM

## 2019-12-23 DIAGNOSIS — R29.898 WEAKNESS OF LOWER EXTREMITY, UNSPECIFIED LATERALITY: ICD-10-CM

## 2019-12-23 DIAGNOSIS — G12.20 MOTOR NEURON DISEASE (HCC): Primary | ICD-10-CM

## 2019-12-23 RX ORDER — RILUZOLE 50 MG/1
50 TABLET, FILM COATED ORAL EVERY 12 HOURS
Qty: 60 TABLET | Refills: 2 | Status: SHIPPED | OUTPATIENT
Start: 2019-12-23 | End: 2020-01-20 | Stop reason: SDUPTHER

## 2019-12-23 RX ORDER — RILUZOLE 50 MG/1
50 TABLET, FILM COATED ORAL EVERY 12 HOURS
Qty: 60 TABLET | Refills: 2 | Status: CANCELLED | OUTPATIENT
Start: 2019-12-23 | End: 2020-01-22

## 2020-01-20 ENCOUNTER — TELEPHONE (OUTPATIENT)
Dept: NEUROLOGY | Age: 55
End: 2020-01-20

## 2020-01-20 ENCOUNTER — OFFICE VISIT (OUTPATIENT)
Dept: NEUROLOGY | Age: 55
End: 2020-01-20
Payer: COMMERCIAL

## 2020-01-20 VITALS
SYSTOLIC BLOOD PRESSURE: 120 MMHG | BODY MASS INDEX: 20.19 KG/M2 | DIASTOLIC BLOOD PRESSURE: 89 MMHG | HEIGHT: 70 IN | HEART RATE: 84 BPM | WEIGHT: 141 LBS

## 2020-01-20 PROCEDURE — G8420 CALC BMI NORM PARAMETERS: HCPCS | Performed by: PSYCHIATRY & NEUROLOGY

## 2020-01-20 PROCEDURE — 1036F TOBACCO NON-USER: CPT | Performed by: PSYCHIATRY & NEUROLOGY

## 2020-01-20 PROCEDURE — 3017F COLORECTAL CA SCREEN DOC REV: CPT | Performed by: PSYCHIATRY & NEUROLOGY

## 2020-01-20 PROCEDURE — G8427 DOCREV CUR MEDS BY ELIG CLIN: HCPCS | Performed by: PSYCHIATRY & NEUROLOGY

## 2020-01-20 PROCEDURE — 99214 OFFICE O/P EST MOD 30 MIN: CPT | Performed by: PSYCHIATRY & NEUROLOGY

## 2020-01-20 PROCEDURE — G8484 FLU IMMUNIZE NO ADMIN: HCPCS | Performed by: PSYCHIATRY & NEUROLOGY

## 2020-01-20 RX ORDER — RILUZOLE 50 MG/1
50 TABLET, FILM COATED ORAL EVERY 12 HOURS
Qty: 60 TABLET | Refills: 5 | Status: SHIPPED | OUTPATIENT
Start: 2020-01-20 | End: 2020-04-19

## 2020-01-20 ASSESSMENT — ENCOUNTER SYMPTOMS
GASTROINTESTINAL NEGATIVE: 1
SHORTNESS OF BREATH: 1
BACK PAIN: 1
ALLERGIC/IMMUNOLOGIC NEGATIVE: 1

## 2020-01-20 NOTE — PROGRESS NOTES
Subjective:      Patient ID: Nuvia Medina is a 53 y.o. male. HPI    Active problem amyotrophic lateral sclerosis with slurring,  gait imbalance and falling to undergo second opinion . The condition is he has been unable to go to [facility] reporting that this is too far unable to get ride to proceed with another neuromuscular opinion locally with Dr Tabby Ferrara at [facility]. He reports bilateral leg weakness with imalance with dragging of right foot with no falling using cane. There is slurring of speech and dysphagia only liquids when no careful. He has been placed on rilutek tolerating this well . There is dsypnea with exertion using nasal BIPAP at night . He is applying fo disability. Significant medication rilutek 50 mg po bid. Testing MRI of Head normal . MRI cervical spine multi level degenartive disc disease with uncovertrbral and facet hypertophy without significant canal stenosis . Urine for heavy metals zinc 199 () , copper 3.3 (0.3-3.2) . MusK Ab , amti GM1 , voltage gate calicum Ab , ganglioside Ab negative acetylcholine receptor Ab's all negative .  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 which can be consistent with motor neuron disease      Past Medical History:   Diagnosis Date    Back pain     on 10/18/19 pt denies pain mgmnt    Degenerative disc disease        Past Surgical History:   Procedure Laterality Date    APPENDECTOMY      COLONOSCOPY  2017    CYSTOSCOPY  10/05/2016    LITHOTRIPSY Right 10/19/2016    LUMBAR FUSION  05/30/2018    LUMBAR INTERBODY FUSION POSTERIOR L3-4    [place] LUMBAR SPINE FUSN,POST INTRBDY N/A 5/30/2018    LUMBAR INTERBODY FUSION POSTERIOR L3-4,  ([address] - OFFICE TO NOTIFY, ROTATING AARON TABLE, C-ARM) performed by Tati Mendiola MD at [place] History   Problem Relation Age of Onset    High Blood Pressure Mother     Diabetes Mother     Heart Disease Mother        Social History Socioeconomic History    Marital status:      Spouse name: None    Number of children: None    Years of education: None    Highest education level: None   Occupational History    None   Social Needs    Financial resource strain: None    Food insecurity:     Worry: None     Inability: None    Transportation needs:     Medical: None     Non-medical: None   Tobacco Use    Smoking status: Former Smoker     Years: 5.00     Types: Cigarettes     Last attempt to quit:      Years since quittin.0    Smokeless tobacco: Never Used   Substance and Sexual Activity    Alcohol use: Yes     Comment: very seldom    Drug use: No    Sexual activity: None   Lifestyle    Physical activity:     Days per week: None     Minutes per session: None    Stress: None   Relationships    Social connections:     Talks on phone: None     Gets together: None     Attends [employer] service: None     Active member of club or organization: None     Attends meetings of clubs or organizations: None     Relationship status: None    Intimate partner violence:     Fear of current or ex partner: None     Emotionally abused: None     Physically abused: None     Forced sexual activity: None   Other Topics Concern    None   Social History Narrative    None       Current Outpatient Medications   Medication Sig Dispense Refill    riluzole (RILUTEK) 50 MG tablet Take 1 tablet by mouth every 12 hours 60 tablet 5     No current facility-administered medications for this visit. Allergies   Allergen Reactions    Sulfa Antibiotics Other (See Comments)     As child (unknown reaction)       Review of Systems   Constitutional: Positive for fatigue and unexpected weight change. HENT: Negative. Eyes: Positive for visual disturbance. Respiratory: Positive for shortness of breath. Cardiovascular: Negative. Gastrointestinal: Negative. Endocrine: Negative. Genitourinary: Negative.     Musculoskeletal: Positive for back pain, gait problem and neck pain. Skin: Negative. Allergic/Immunologic: Negative. Neurological: Positive for speech difficulty, weakness, numbness and headaches. Hematological: Negative. Psychiatric/Behavioral: Negative. Objective:   Physical Exam    Vitals:    01/20/20 0901   BP: 120/89   Pulse: 84     weight: 141 lb (64 kg)    Neurological Examination  Constitutional .General exam well groomed   Head/Ears /Nose/Throat: external ear . Normal exam  Neck and thyroid . Normal size. No bruits  Respiratory . Breathsounds clear bilaterally  Cardiovascular: Auscultation of heart with regular rate and rhythm  Musculoskeletal. Muscle atrophy bilateral dorsal interossei with fasciculations left hand . Normal tone                                                         Muscle strength iliopsoas right 4/5 , left 4+/5 . ADF, EHL , AE bilaterally 4-/5 giveway weakness throughout                                                                                No dysmetria or dysdiadokinesis  No tremor   Normal fine motor  Gait imbalance   Orientation Alert and oriented x 3   Attention and concentration normal  Short term memory normal  Language process normal . Slow speech with with some dysarthria   Cranial nerve 2 normal acuety and visual fields  Cranial nerve 3, 4 and 6 . Extraocular muscles are intact . Pupils are equal and reactive   Cranial nerve 5 . Intact corneal reflex. Normal facial sensation  Cranial nerve 7 mild decreaae left NLF   Cranial nerve 8. Grossly intact hearing   Cranial nerve 9 and 10. Symmetric palate elevation   Cranial nerve 11 , 5 out of 5 strength   Cranial Nerve 12 midline tongue . Tongue atrophy with fasciculation  Sensation  Decraese pinprick and light touch right foor  Deep Tendon Reflexes brisk throughout   Plantar response flexor bilaterally    Assessment:       Diagnosis Orders   1. ALS (amyotrophic lateral sclerosis) ([facility] [address]     2.  Weakness of lower extremity, unspecified

## 2020-01-20 NOTE — TELEPHONE ENCOUNTER
Received a fax from [employer] requesting prior auth for Riluzole through [employer]. Prior auth initiated and sent to plan.

## 2020-01-20 NOTE — LETTER
Allergen Reactions    Sulfa Antibiotics Other (See Comments)     As child (unknown reaction)       Review of Systems   Constitutional: Positive for fatigue and unexpected weight change. HENT: Negative. Eyes: Positive for visual disturbance. Respiratory: Positive for shortness of breath. Cardiovascular: Negative. Gastrointestinal: Negative. Endocrine: Negative. Genitourinary: Negative. Musculoskeletal: Positive for back pain, gait problem and neck pain. Skin: Negative. Allergic/Immunologic: Negative. Neurological: Positive for speech difficulty, weakness, numbness and headaches. Hematological: Negative. Psychiatric/Behavioral: Negative. Objective:   Physical Exam    Vitals:    01/20/20 0901   BP: 120/89   Pulse: 84     weight: 141 lb (64 kg)    Neurological Examination  Constitutional .General exam well groomed   Head/Ears /Nose/Throat: external ear . Normal exam  Neck and thyroid . Normal size. No bruits  Respiratory . Breathsounds clear bilaterally  Cardiovascular: Auscultation of heart with regular rate and rhythm  Musculoskeletal. Muscle atrophy bilateral dorsal interossei with fasciculations left hand . Normal tone                                                         Muscle strength iliopsoas right 4/5 , left 4+/5 . ADF, EHL , AE bilaterally 4-/5 giveway weakness throughout                                                                                No dysmetria or dysdiadokinesis  No tremor   Normal fine motor  Gait imbalance   Orientation Alert and oriented x 3   Attention and concentration normal  Short term memory normal  Language process normal . Slow speech with with some dysarthria   Cranial nerve 2 normal acuety and visual fields  Cranial nerve 3, 4 and 6 . Extraocular muscles are intact . Pupils are equal and reactive   Cranial nerve 5 . Intact corneal reflex.  Normal facial sensation  Cranial nerve 7 mild decreaae left NLF Cranial nerve 8. Grossly intact hearing   Cranial nerve 9 and 10. Symmetric palate elevation   Cranial nerve 11 , 5 out of 5 strength   Cranial Nerve 12 midline tongue . Tongue atrophy with fasciculation  Sensation  Decraese pinprick and light touch right foor  Deep Tendon Reflexes brisk throughout   Plantar response flexor bilaterally    Assessment:       Diagnosis Orders   1. ALS (amyotrophic lateral sclerosis) ([facility] [address]     2. Weakness of lower extremity, unspecified laterality     3. Dysarthria     He has been started on rilutek having second opinion pending with neuromuscular subspecialist        Plan:      As above         Naty Donaldson MD    If you have questions, please do not hesitate to call me. I look forward to following Evi Tony along with you.     Sincerely,        Naty Donaldson MD    CC providers:  Saul Yancey, [address] [place] [address] [address]

## 2020-01-20 NOTE — LETTER
[facility]  [address]  [address]  Phone: [phone]  Fax: [phone]    Arabella Reeves MD        January 21, 2020       Patient: Oneida Ronquillo   MR Number: [id number]   YOB: 1965   Date of Visit: 1/20/2020       Dear Dr. Jessica Vickers:    Thank you for the request for consultation for Carlos London to me for the evaluation of ***. Below are the relevant portions of my assessment and plan of care. Assessment:   Diagnosis Orders   1. ALS (amyotrophic lateral sclerosis) ([facility] [address]     2. Weakness of lower extremity, unspecified laterality     3. Dysarthria     He has been started on rilutek having second opinion pending with neuromuscular subspecialist        Plan:  As above   If you have questions, please do not hesitate to call me. I look forward to following Cheryl Garcia along with you.     Sincerely,        Robbin Bearden MD    CC providers:  Mary Beth Damian, [address] [place] [address] [address] [place]  [facility] [address] [place] [address]

## 2020-01-20 NOTE — TELEPHONE ENCOUNTER
Message through [employer] came back that the Riluzole had been denied. Message stated \"Please use Preferred Drug List (PDL) medications, at least two of the following: [PDL medications] (generics when available). Plan guideline CP. PMN.16 (Request for Medically Necessary Drug not on the PD\". I placed a call to [facility] [phone] and spoke with Greer. PA dept's computers were down and she asked that I call them back in a half hour. Call was placed again to the PA dept ([phone]) and I spoke with Pat Ramos. When she pulled the information up she did see that it had been denied, however right above the denial was an approval.  Pat Ramos ran a test claim and the medication went through. She stated that we would be receiving a letter regarding this. Call placed to [facility] and I spoke with pharmacist Yuliana. She ran the Rx and it went through. Call placed to the patient and this information was given. I asked that he call me if there were any problems.

## 2020-01-21 RX ORDER — IBUPROFEN 600 MG/1
600 TABLET ORAL 3 TIMES DAILY PRN
Qty: 90 TABLET | Refills: 2 | OUTPATIENT
Start: 2020-01-21 | End: 2020-06-07

## 2020-01-21 NOTE — COMMUNICATION BODY
Socioeconomic History    Marital status:      Spouse name: None    Number of children: None    Years of education: None    Highest education level: None   Occupational History    None   Social Needs    Financial resource strain: None    Food insecurity:     Worry: None     Inability: None    Transportation needs:     Medical: None     Non-medical: None   Tobacco Use    Smoking status: Former Smoker     Years: 5.00     Types: Cigarettes     Last attempt to quit:      Years since quittin.0    Smokeless tobacco: Never Used   Substance and Sexual Activity    Alcohol use: Yes     Comment: very seldom    Drug use: No    Sexual activity: None   Lifestyle    Physical activity:     Days per week: None     Minutes per session: None    Stress: None   Relationships    Social connections:     Talks on phone: None     Gets together: None     Attends Latter day service: None     Active member of club or organization: None     Attends meetings of clubs or organizations: None     Relationship status: None    Intimate partner violence:     Fear of current or ex partner: None     Emotionally abused: None     Physically abused: None     Forced sexual activity: None   Other Topics Concern    None   Social History Narrative    None       Current Outpatient Medications   Medication Sig Dispense Refill    riluzole (RILUTEK) 50 MG tablet Take 1 tablet by mouth every 12 hours 60 tablet 5     No current facility-administered medications for this visit. Allergies   Allergen Reactions    Sulfa Antibiotics Other (See Comments)     As child (unknown reaction)       Review of Systems   Constitutional: Positive for fatigue and unexpected weight change. HENT: Negative. Eyes: Positive for visual disturbance. Respiratory: Positive for shortness of breath. Cardiovascular: Negative. Gastrointestinal: Negative. Endocrine: Negative. Genitourinary: Negative.     Musculoskeletal: Positive for back pain, gait problem and neck pain. Skin: Negative. Allergic/Immunologic: Negative. Neurological: Positive for speech difficulty, weakness, numbness and headaches. Hematological: Negative. Psychiatric/Behavioral: Negative. Objective:   Physical Exam    Vitals:    01/20/20 0901   BP: 120/89   Pulse: 84     weight: 141 lb (64 kg)    Neurological Examination  Constitutional .General exam well groomed   Head/Ears /Nose/Throat: external ear . Normal exam  Neck and thyroid . Normal size. No bruits  Respiratory . Breathsounds clear bilaterally  Cardiovascular: Auscultation of heart with regular rate and rhythm  Musculoskeletal. Muscle atrophy bilateral dorsal interossei with fasciculations left hand . Normal tone                                                         Muscle strength iliopsoas right 4/5 , left 4+/5 . ADF, EHL , AE bilaterally 4-/5 giveway weakness throughout                                                                                No dysmetria or dysdiadokinesis  No tremor   Normal fine motor  Gait imbalance   Orientation Alert and oriented x 3   Attention and concentration normal  Short term memory normal  Language process normal . Slow speech with with some dysarthria   Cranial nerve 2 normal acuety and visual fields  Cranial nerve 3, 4 and 6 . Extraocular muscles are intact . Pupils are equal and reactive   Cranial nerve 5 . Intact corneal reflex. Normal facial sensation  Cranial nerve 7 mild decreaae left NLF   Cranial nerve 8. Grossly intact hearing   Cranial nerve 9 and 10. Symmetric palate elevation   Cranial nerve 11 , 5 out of 5 strength   Cranial Nerve 12 midline tongue . Tongue atrophy with fasciculation  Sensation  Decraese pinprick and light touch right foor  Deep Tendon Reflexes brisk throughout   Plantar response flexor bilaterally    Assessment:       Diagnosis Orders   1. ALS (amyotrophic lateral sclerosis) ([facility] [address]     2.  Weakness of lower extremity, unspecified

## 2020-06-05 ENCOUNTER — HOSPITAL ENCOUNTER (EMERGENCY)
Age: 55
Discharge: LEFT BEFORE DISCHARGE | End: 2020-06-05
Attending: EMERGENCY MEDICINE

## 2020-06-05 VITALS
BODY MASS INDEX: 18.61 KG/M2 | TEMPERATURE: 98.3 F | RESPIRATION RATE: 20 BRPM | HEART RATE: 77 BPM | HEIGHT: 70 IN | OXYGEN SATURATION: 95 % | WEIGHT: 130 LBS | SYSTOLIC BLOOD PRESSURE: 120 MMHG | DIASTOLIC BLOOD PRESSURE: 83 MMHG

## 2020-06-05 RX ORDER — RILUZOLE 50 MG/1
50 TABLET, FILM COATED ORAL EVERY 12 HOURS
Status: ON HOLD | COMMUNITY
Start: 2020-05-19 | End: 2020-07-19 | Stop reason: HOSPADM

## 2020-06-05 ASSESSMENT — PAIN SCALES - GENERAL: PAINLEVEL_OUTOF10: 6

## 2020-06-05 ASSESSMENT — PAIN DESCRIPTION - PAIN TYPE: TYPE: ACUTE PAIN

## 2020-06-05 ASSESSMENT — PAIN DESCRIPTION - DESCRIPTORS: DESCRIPTORS: TIGHTNESS

## 2020-06-05 ASSESSMENT — PAIN DESCRIPTION - LOCATION: LOCATION: CHEST

## 2020-06-06 NOTE — ED PROVIDER NOTES
Pt not evaluated by me. Pt left without being seen after RN triage.       David Torres MD  06/06/20 5495

## 2020-06-07 ENCOUNTER — APPOINTMENT (OUTPATIENT)
Dept: CT IMAGING | Age: 55
End: 2020-06-07
Payer: MEDICARE

## 2020-06-07 ENCOUNTER — HOSPITAL ENCOUNTER (EMERGENCY)
Age: 55
Discharge: HOME | End: 2020-06-07
Attending: EMERGENCY MEDICINE
Payer: MEDICARE

## 2020-06-07 VITALS
BODY MASS INDEX: 18.61 KG/M2 | HEART RATE: 79 BPM | WEIGHT: 130 LBS | HEIGHT: 70 IN | RESPIRATION RATE: 19 BRPM | SYSTOLIC BLOOD PRESSURE: 139 MMHG | OXYGEN SATURATION: 96 % | TEMPERATURE: 97.6 F | DIASTOLIC BLOOD PRESSURE: 75 MMHG

## 2020-06-07 LAB
-: NORMAL
ABSOLUTE EOS #: 0.06 K/UL (ref 0–0.44)
ABSOLUTE IMMATURE GRANULOCYTE: 0.05 K/UL (ref 0–0.3)
ABSOLUTE LYMPH #: 0.95 K/UL (ref 1.1–3.7)
ABSOLUTE MONO #: 0.5 K/UL (ref 0.1–1.2)
ALBUMIN SERPL-MCNC: 4.6 G/DL (ref 3.5–5.2)
ALBUMIN/GLOBULIN RATIO: ABNORMAL (ref 1–2.5)
ALP BLD-CCNC: 52 U/L (ref 40–129)
ALT SERPL-CCNC: 47 U/L (ref 5–41)
AMORPHOUS: NORMAL
ANION GAP SERPL CALCULATED.3IONS-SCNC: 14 MMOL/L (ref 9–17)
AST SERPL-CCNC: 35 U/L
BACTERIA: NORMAL
BASOPHILS # BLD: 1 % (ref 0–2)
BASOPHILS ABSOLUTE: 0.06 K/UL (ref 0–0.2)
BILIRUB SERPL-MCNC: 0.73 MG/DL (ref 0.3–1.2)
BILIRUBIN DIRECT: 0.24 MG/DL
BILIRUBIN URINE: NEGATIVE
BILIRUBIN, INDIRECT: 0.49 MG/DL (ref 0–1)
BUN BLDV-MCNC: 19 MG/DL (ref 6–20)
BUN/CREAT BLD: 30 (ref 9–20)
CALCIUM SERPL-MCNC: 9.9 MG/DL (ref 8.6–10.4)
CASTS UA: NORMAL /LPF
CHLORIDE BLD-SCNC: 99 MMOL/L (ref 98–107)
CO2: 30 MMOL/L (ref 20–31)
COLOR: YELLOW
COMMENT UA: ABNORMAL
CREAT SERPL-MCNC: 0.63 MG/DL (ref 0.7–1.2)
CRYSTALS, UA: NORMAL /HPF
DIFFERENTIAL TYPE: ABNORMAL
EOSINOPHILS RELATIVE PERCENT: 1 % (ref 1–4)
EPITHELIAL CELLS UA: NORMAL /HPF (ref 0–5)
GFR AFRICAN AMERICAN: >60 ML/MIN
GFR NON-AFRICAN AMERICAN: >60 ML/MIN
GFR SERPL CREATININE-BSD FRML MDRD: ABNORMAL ML/MIN/{1.73_M2}
GFR SERPL CREATININE-BSD FRML MDRD: ABNORMAL ML/MIN/{1.73_M2}
GLOBULIN: ABNORMAL G/DL (ref 1.5–3.8)
GLUCOSE BLD-MCNC: 131 MG/DL (ref 70–99)
GLUCOSE URINE: NEGATIVE
HCT VFR BLD CALC: 46.3 % (ref 40.7–50.3)
HEMOGLOBIN: 14.8 G/DL (ref 13–17)
IMMATURE GRANULOCYTES: 1 %
KETONES, URINE: ABNORMAL
LEUKOCYTE ESTERASE, URINE: NEGATIVE
LIPASE: 22 U/L (ref 13–60)
LYMPHOCYTES # BLD: 11 % (ref 24–43)
MCH RBC QN AUTO: 31.4 PG (ref 25.2–33.5)
MCHC RBC AUTO-ENTMCNC: 32 G/DL (ref 28.4–34.8)
MCV RBC AUTO: 98.1 FL (ref 82.6–102.9)
MONOCYTES # BLD: 6 % (ref 3–12)
MUCUS: NORMAL
NITRITE, URINE: NEGATIVE
NRBC AUTOMATED: 0 PER 100 WBC
OTHER OBSERVATIONS UA: NORMAL
PDW BLD-RTO: 13 % (ref 11.8–14.4)
PH UA: 6 (ref 5–8)
PLATELET # BLD: 142 K/UL (ref 138–453)
PLATELET ESTIMATE: ABNORMAL
PMV BLD AUTO: 10.1 FL (ref 8.1–13.5)
POTASSIUM SERPL-SCNC: 4.2 MMOL/L (ref 3.7–5.3)
PROTEIN UA: ABNORMAL
RBC # BLD: 4.72 M/UL (ref 4.21–5.77)
RBC # BLD: ABNORMAL 10*6/UL
RBC UA: NORMAL /HPF (ref 0–2)
RENAL EPITHELIAL, UA: NORMAL /HPF
SEG NEUTROPHILS: 81 % (ref 36–65)
SEGMENTED NEUTROPHILS ABSOLUTE COUNT: 6.79 K/UL (ref 1.5–8.1)
SODIUM BLD-SCNC: 143 MMOL/L (ref 135–144)
SPECIFIC GRAVITY UA: 1.02 (ref 1–1.03)
TOTAL PROTEIN: 6.9 G/DL (ref 6.4–8.3)
TRICHOMONAS: NORMAL
TURBIDITY: CLEAR
URINE HGB: ABNORMAL
UROBILINOGEN, URINE: NORMAL
WBC # BLD: 8.4 K/UL (ref 3.5–11.3)
WBC # BLD: ABNORMAL 10*3/UL
WBC UA: NORMAL /HPF (ref 0–5)
YEAST: NORMAL

## 2020-06-07 PROCEDURE — 85025 COMPLETE CBC W/AUTO DIFF WBC: CPT

## 2020-06-07 PROCEDURE — 74176 CT ABD & PELVIS W/O CONTRAST: CPT

## 2020-06-07 PROCEDURE — 96374 THER/PROPH/DIAG INJ IV PUSH: CPT

## 2020-06-07 PROCEDURE — 6360000002 HC RX W HCPCS: Performed by: EMERGENCY MEDICINE

## 2020-06-07 PROCEDURE — 99283 EMERGENCY DEPT VISIT LOW MDM: CPT

## 2020-06-07 PROCEDURE — 80048 BASIC METABOLIC PNL TOTAL CA: CPT

## 2020-06-07 PROCEDURE — 81001 URINALYSIS AUTO W/SCOPE: CPT

## 2020-06-07 PROCEDURE — 83690 ASSAY OF LIPASE: CPT

## 2020-06-07 PROCEDURE — 80076 HEPATIC FUNCTION PANEL: CPT

## 2020-06-07 PROCEDURE — 6370000000 HC RX 637 (ALT 250 FOR IP): Performed by: EMERGENCY MEDICINE

## 2020-06-07 PROCEDURE — 96375 TX/PRO/DX INJ NEW DRUG ADDON: CPT

## 2020-06-07 RX ORDER — OXYCODONE HYDROCHLORIDE AND ACETAMINOPHEN 5; 325 MG/1; MG/1
1 TABLET ORAL EVERY 6 HOURS PRN
Qty: 12 TABLET | Refills: 0 | Status: SHIPPED | OUTPATIENT
Start: 2020-06-07 | End: 2020-06-10

## 2020-06-07 RX ORDER — HYDROCODONE BITARTRATE AND ACETAMINOPHEN 5; 325 MG/1; MG/1
1 TABLET ORAL EVERY 6 HOURS PRN
Qty: 10 TABLET | Refills: 0 | Status: SHIPPED | OUTPATIENT
Start: 2020-06-07 | End: 2020-06-07 | Stop reason: SINTOL

## 2020-06-07 RX ORDER — KETOROLAC TROMETHAMINE 30 MG/ML
30 INJECTION, SOLUTION INTRAMUSCULAR; INTRAVENOUS ONCE
Status: COMPLETED | OUTPATIENT
Start: 2020-06-07 | End: 2020-06-07

## 2020-06-07 RX ORDER — HYDROMORPHONE HYDROCHLORIDE 1 MG/ML
0.5 INJECTION, SOLUTION INTRAMUSCULAR; INTRAVENOUS; SUBCUTANEOUS ONCE
Status: COMPLETED | OUTPATIENT
Start: 2020-06-07 | End: 2020-06-07

## 2020-06-07 RX ORDER — IBUPROFEN 800 MG/1
800 TABLET ORAL ONCE
Status: COMPLETED | OUTPATIENT
Start: 2020-06-07 | End: 2020-06-07

## 2020-06-07 RX ORDER — ALFUZOSIN HYDROCHLORIDE 10 MG/1
10 TABLET, EXTENDED RELEASE ORAL DAILY
Qty: 7 TABLET | Refills: 3 | Status: ON HOLD | OUTPATIENT
Start: 2020-06-07 | End: 2020-06-25 | Stop reason: ALTCHOICE

## 2020-06-07 RX ADMIN — HYDROMORPHONE HYDROCHLORIDE 0.5 MG: 1 INJECTION, SOLUTION INTRAMUSCULAR; INTRAVENOUS; SUBCUTANEOUS at 08:50

## 2020-06-07 RX ADMIN — IBUPROFEN 800 MG: 800 TABLET ORAL at 10:05

## 2020-06-07 RX ADMIN — KETOROLAC TROMETHAMINE 30 MG: 30 INJECTION, SOLUTION INTRAMUSCULAR at 08:48

## 2020-06-07 ASSESSMENT — ENCOUNTER SYMPTOMS
RHINORRHEA: 0
SORE THROAT: 0
VOMITING: 0
EYE DISCHARGE: 0
COLOR CHANGE: 0
COUGH: 0
SHORTNESS OF BREATH: 0
EYE REDNESS: 0
DIARRHEA: 0
NAUSEA: 0

## 2020-06-07 ASSESSMENT — PAIN DESCRIPTION - PAIN TYPE: TYPE: ACUTE PAIN

## 2020-06-07 ASSESSMENT — PAIN DESCRIPTION - ORIENTATION: ORIENTATION: LEFT;MID

## 2020-06-07 ASSESSMENT — PAIN SCALES - GENERAL
PAINLEVEL_OUTOF10: 5
PAINLEVEL_OUTOF10: 5
PAINLEVEL_OUTOF10: 10

## 2020-06-07 ASSESSMENT — PAIN DESCRIPTION - DESCRIPTORS: DESCRIPTORS: CONSTANT

## 2020-06-07 ASSESSMENT — PAIN DESCRIPTION - LOCATION: LOCATION: BACK

## 2020-06-07 NOTE — ED PROVIDER NOTES
Effort: Pulmonary effort is normal.      Breath sounds: Normal breath sounds. Abdominal:      General: Abdomen is flat. Palpations: Abdomen is soft. Tenderness: There is no abdominal tenderness. There is left CVA tenderness. There is no right CVA tenderness or guarding. Negative signs include Andrews's sign and McBurney's sign. Neurological:      Mental Status: He is alert. MEDICAL DECISION MAKING:     This is a 58-year-old male with a history of ALS, the patient has cachexia and malnutrition issues, he is at baseline regarding this. Patient also has left flank pain. He denies any chest pain or shortness of breath to suggest acute coronary syndrome, thoracic aortic dissection or pulmonary embolism. The patient has previous history of kidney stones as well as previous lumbar surgery. We will obtain a work-up for kidney stones, treat his pain and reevaluate. 10:02 AM EDT  Patient has evidence of a kidney stone, no evidence of infection, plan is pain control, antispasmodics, outpatient follow-up with PCP and return if symptoms worsen or change. CRITICAL CARE:       PROCEDURES:    Procedures    DIAGNOSTIC RESULTS   EKG:All EKG's are interpreted by the Emergency Department Physician who either signs or Co-signs this chart in the absence of a cardiologist.        RADIOLOGY:All plain film, CT, MRI, and formal ultrasound images (except ED bedside ultrasound) are read by the radiologist, see reports below, unless otherwisenoted in MDM or here. CT ABDOMEN PELVIS WO CONTRAST   Final Result   1.  3 mm obstructing stone in the left UVJ resulting in moderate   hydronephrosis. Additionally, there is a 1-2 mm stone in the distal left   ureter. 2.  Bilateral punctate nephrolithiasis. LABS: All lab results were reviewed by myself, and all abnormals are listed below.   Labs Reviewed   BASIC METABOLIC PANEL - Abnormal; Notable for the following components:       Result Value    Glucose

## 2020-06-25 ENCOUNTER — HOSPITAL ENCOUNTER (INPATIENT)
Age: 55
LOS: 8 days | Discharge: HOME HEALTH | DRG: 004 | End: 2020-07-03
Attending: FAMILY MEDICINE | Admitting: FAMILY MEDICINE
Payer: MEDICARE

## 2020-06-25 PROBLEM — J96.00 ACUTE RESPIRATORY FAILURE (HCC): Status: ACTIVE | Noted: 2020-06-25

## 2020-06-25 PROCEDURE — 1200000000 HC SEMI PRIVATE

## 2020-06-25 PROCEDURE — 94761 N-INVAS EAR/PLS OXIMETRY MLT: CPT

## 2020-06-25 PROCEDURE — 85027 COMPLETE CBC AUTOMATED: CPT

## 2020-06-25 PROCEDURE — 2580000003 HC RX 258: Performed by: FAMILY MEDICINE

## 2020-06-25 PROCEDURE — 80048 BASIC METABOLIC PNL TOTAL CA: CPT

## 2020-06-25 PROCEDURE — 94640 AIRWAY INHALATION TREATMENT: CPT

## 2020-06-25 RX ORDER — ACETAMINOPHEN 650 MG/1
650 SUPPOSITORY RECTAL EVERY 6 HOURS PRN
Status: DISCONTINUED | OUTPATIENT
Start: 2020-06-25 | End: 2020-07-03 | Stop reason: HOSPADM

## 2020-06-25 RX ORDER — ALBUTEROL SULFATE 2.5 MG/3ML
2.5 SOLUTION RESPIRATORY (INHALATION)
Status: DISCONTINUED | OUTPATIENT
Start: 2020-06-25 | End: 2020-07-03 | Stop reason: HOSPADM

## 2020-06-25 RX ORDER — TRAZODONE HYDROCHLORIDE 50 MG/1
50 TABLET ORAL NIGHTLY PRN
Status: ON HOLD | COMMUNITY
End: 2020-07-03 | Stop reason: HOSPADM

## 2020-06-25 RX ORDER — NICOTINE 21 MG/24HR
1 PATCH, TRANSDERMAL 24 HOURS TRANSDERMAL DAILY PRN
Status: DISCONTINUED | OUTPATIENT
Start: 2020-06-25 | End: 2020-06-26

## 2020-06-25 RX ORDER — SODIUM CHLORIDE 9 MG/ML
INJECTION, SOLUTION INTRAVENOUS CONTINUOUS
Status: DISCONTINUED | OUTPATIENT
Start: 2020-06-25 | End: 2020-06-27

## 2020-06-25 RX ORDER — FAMOTIDINE 20 MG/1
20 TABLET, FILM COATED ORAL DAILY
Status: DISCONTINUED | OUTPATIENT
Start: 2020-06-25 | End: 2020-07-03 | Stop reason: HOSPADM

## 2020-06-25 RX ORDER — PROMETHAZINE HYDROCHLORIDE 12.5 MG/1
12.5 TABLET ORAL EVERY 6 HOURS PRN
Status: DISCONTINUED | OUTPATIENT
Start: 2020-06-25 | End: 2020-07-03 | Stop reason: HOSPADM

## 2020-06-25 RX ORDER — POLYETHYLENE GLYCOL 3350 17 G/17G
17 POWDER, FOR SOLUTION ORAL DAILY PRN
Status: DISCONTINUED | OUTPATIENT
Start: 2020-06-25 | End: 2020-07-03 | Stop reason: HOSPADM

## 2020-06-25 RX ORDER — SODIUM CHLORIDE 0.9 % (FLUSH) 0.9 %
10 SYRINGE (ML) INJECTION EVERY 12 HOURS SCHEDULED
Status: DISCONTINUED | OUTPATIENT
Start: 2020-06-25 | End: 2020-07-03 | Stop reason: HOSPADM

## 2020-06-25 RX ORDER — ONDANSETRON 2 MG/ML
4 INJECTION INTRAMUSCULAR; INTRAVENOUS EVERY 6 HOURS PRN
Status: DISCONTINUED | OUTPATIENT
Start: 2020-06-25 | End: 2020-07-03 | Stop reason: HOSPADM

## 2020-06-25 RX ORDER — SODIUM CHLORIDE 0.9 % (FLUSH) 0.9 %
10 SYRINGE (ML) INJECTION PRN
Status: DISCONTINUED | OUTPATIENT
Start: 2020-06-25 | End: 2020-07-03 | Stop reason: HOSPADM

## 2020-06-25 RX ORDER — IPRATROPIUM BROMIDE AND ALBUTEROL SULFATE 2.5; .5 MG/3ML; MG/3ML
1 SOLUTION RESPIRATORY (INHALATION)
Status: DISCONTINUED | OUTPATIENT
Start: 2020-06-25 | End: 2020-06-26

## 2020-06-25 RX ORDER — RILUZOLE 50 MG/1
50 TABLET, FILM COATED ORAL EVERY 12 HOURS
Status: DISCONTINUED | OUTPATIENT
Start: 2020-06-25 | End: 2020-07-03 | Stop reason: HOSPADM

## 2020-06-25 RX ORDER — ACETAMINOPHEN 325 MG/1
650 TABLET ORAL EVERY 6 HOURS PRN
Status: DISCONTINUED | OUTPATIENT
Start: 2020-06-25 | End: 2020-07-03 | Stop reason: HOSPADM

## 2020-06-25 RX ADMIN — SODIUM CHLORIDE: 9 INJECTION, SOLUTION INTRAVENOUS at 17:59

## 2020-06-25 RX ADMIN — RILUZOLE 50 MG: 50 TABLET, FILM COATED ORAL at 22:11

## 2020-06-25 ASSESSMENT — PAIN SCALES - GENERAL
PAINLEVEL_OUTOF10: 0
PAINLEVEL_OUTOF10: 0

## 2020-06-25 NOTE — PROGRESS NOTES
Spoke to Dr. Mar Khan and updated on patient. He is on his way here to make rounds. He states it's okay for ENT to see patient tomorrow, but would like him to be on surgery schedule tomorrow for trach d/t his rapidly worsening ALS. Dr. Mar Khan had spoken to patient's pulmonologist today. Message sent to Dr. Fei Gong.

## 2020-06-25 NOTE — PROGRESS NOTES
Spoke to Pipe Sharma and states to hold all blood thinners and let house and charge nurse know to put patient on surgery schedule for trach tomorrow at noon. He will assess patient tomorrow morning. Shari Dykes supervisor updated and states he will take care of it.

## 2020-06-25 NOTE — H&P
History & Physical  [facility].,    Adult Hospitalist      Name: Jonatan Tapia  MRN: [id number]     [place]: [de-identified]  Room: 2025/2025-01    Admit Date: 6/25/2020  2:39 PM  PCP: Latasha Jefferson MD    Primary Problem  Active Problems:    Acute respiratory failure [facility])  Resolved Problems:    * No resolved hospital problems. *        Assesment:     · Acute on chronic respiratory failure  · Hypoxia  · Amyotrophic lateral sclerosis  · Progressive weakness  · Dysarthria  · Osteoarthritis multiple joints  · Low back pain  · Nephrolithiasis  · Past smoker  · COVID rule out        Plan:     · Admit to progressive  · Monitor vitals closely  · Keep SPO2 above 90%  · BiPAP  · RT eval  · O2 as needed  · DuoNeb every 4 hours as needed  · IV fluids-saline at 75 mL's per hour  · Diet dental soft  · N.p.o. after midnight  · Hold trazodone  · Resume Rilutek  · Famotidine  · Pain control  · Antiemetics PRN  · Chest x-ray  · Sputum Gram stain  · Respiratory culture  · Check COVID-19  · CBC, BMP  · Consult ENT  · Consult pulmonology  · Incentive spirometry  · Lovenox  · Consult PT/OT  · DVT and GI prophylaxis. Chief Complaint:     No chief complaint on file. 'Unable to breathe'      History of Present Illness:      Jonatan Tapia is a 53 y.o.  male who presents with No chief complaint on file. Patient presents with difficulty in breathing progressively over the last 2-3 weeks. Patient has history of ALS and has been seeing his pulmonologist.  Patient was placed on BiPAP which was initially needed at night. Over the last 2 weeks or so the patient has needed BiPAP during the day also. Patient says he has had cough which has been congested in addition. He says since yesterday he felt significant worsening. Does not have any fever headache or facial pressure. Denies any diarrhea or abdominal pain. And has had episodes of orthopnea and paroxysmal nocturnal dyspnea within the several days.     Patient called his pulmonologist who adjusted his settings initially which have not been effective. We were called by Ramesh Lombardo CNP today with concerns of rapid deterioration of the patient because of his chest wall muscle weakness. He has not been able to sustain oxygenation and the only option would be for him to get a tracheostomy. Risk and benefits were discussed in detail with the patient and he has agreed to proceed. Does not have any prior history of coronary artery disease. Endurance has been reduced slowly over the last few months. Currently patient denies any chest pain, wheezing, fever, nausea, vomiting or abdominal pain. Denies any diarrhea or constipation. Denies rash or joint swelling. Patient has had progressive weight loss progressive severe weakness    We have consulted ENT who will see him in the morning and have placed him on the surgery schedule tentatively for an elective tracheostomy    I have personally reviewed the past medical history, past surgical history, medications, social history, and family history, and summarized in the note. Review of Systems:     All 10 point system is reviewed and negative otherwise mentioned in HPI.       Past Medical History:     Past Medical History:   Diagnosis Date    Amyotrophic lateral sclerosis (ALS) ([facility] [address]     Back pain     on 10/18/19 pt denies pain mgmnt    Degenerative disc disease         Past Surgical History:     Past Surgical History:   Procedure Laterality Date    APPENDECTOMY      COLONOSCOPY  2017    CYSTOSCOPY  10/05/2016    LITHOTRIPSY Right 10/19/2016    LUMBAR FUSION  05/30/2018    LUMBAR INTERBODY FUSION POSTERIOR L3-4    MT LUMBAR SPINE FUSN,POST INTRBDY N/A 5/30/2018    LUMBAR INTERBODY FUSION POSTERIOR L3-4,  ([address] - OFFICE TO NOTIFY, ROTATING AARON TABLE, C-ARM) performed by Flynn Pérez MD at [place] [address]        Medications Prior to Admission:       Prior to Admission medications    Medication Sig Start Date End Date Taking? Authorizing Provider   alfuzosin (UROXATRAL) 10 MG extended release tablet Take 1 tablet by mouth daily for 7 days 6/7/20 6/14/20  Meoldy Ramirez MD   riluzole (RILUTEK) 50 MG tablet  5/19/20   Historical Provider, MD        Allergies:       Sulfa antibiotics    Social History:     Tobacco:    reports that he quit smoking about 31 years ago. His smoking use included cigarettes. He quit after 5.00 years of use. He has never used smokeless tobacco.  Alcohol:      reports previous alcohol use. Drug Use:  reports no history of drug use. Family History:     Family History   Problem Relation Age of Onset    High Blood Pressure Mother     Diabetes Mother     Heart Disease Mother          Physical Exam:     Vitals: There were no vitals taken for this visit. No data recorded.           General appearance - alert, malnourished, and in respiratory distress, BiPAP  Mental status - oriented to person, place, and time with normal affect  Head - normocephalic and atraumatic  Eyes - pupils equal and reactive, extraocular eye movements intact, conjunctiva clear  Ears - hearing appears to be intact  Nose - no drainage noted  Mouth - mucous membranes moist  Neck - supple, no carotid bruits, thyroid not palpable  Chest -coarse crackles otherwise clear to auscultation, decreased effort  Heart - normal rate, regular rhythm, no murmur  Abdomen - soft, nontender, nondistended, bowel sounds present all four quadrants, no masses, hepatomegaly or splenomegaly  Neurological -dysarthria, severe weakness bilateral upper and lower extremity, no significant paresthesias, no movement disorder noted, cranial nerves II through VII grossly intact  Extremities - peripheral pulses palpable, no pedal edema or calf pain with palpation  Skin - no gross lesions, rashes, or induration noted        Data:     Labs:    Hematology:No results for input(s): WBC, RBC, HGB, HCT, MCV, MCH, MCHC, RDW, PLT, MPV, SEDRATE, CRP, INR, DDIMER, [id number], LABABSO in the last 72 hours. Invalid input(s): PT  Chemistry:No results for input(s): NA, K, CL, CO2, GLUCOSE, BUN, CREATININE, MG, ANIONGAP, LABGLOM, GFRAA, CALCIUM, CAION, PHOS, PSA, PROBNP, TROPHS, CKTOTAL, CKMB, CKMBINDEX, MYOGLOBIN, DIGOXIN, LACTACIDWB in the last 72 hours. No results for input(s): PROT, LABALBU, LABA1C, K8WJQKF, R3PWEBK, FT4, TSH, AST, ALT, LDH, GGT, ALKPHOS, LABGGT, BILITOT, BILIDIR, AMMONIA, AMYLASE, LIPASE, LACTATE, CHOL, HDL, LDLCHOLESTEROL, CHOLHDLRATIO, TRIG, VLDL, VKS78JR, PHENYTOIN, PHENYF, URICACID, POCGLU in the last 72 hours. Lab Results   Component Value Date    INR 1.0 10/18/2019    PROTIME 10.1 10/18/2019       Lab Results   Component Value Date/Time    SPECIAL 7ML RT [facility] [place] 10/18/2019 04:47 AM     Lab Results   Component Value Date/Time    CULTURE NO GROWTH 6 DAYS 10/18/2019 04:47 AM       Lab Results   Component Value Date    POCPH 7.33 10/20/2019    POCPCO2 74 10/20/2019    POCPO2 70 10/20/2019    POCHCO3 39.1 10/20/2019    NBEA NOT REPORTED 10/20/2019    PBEA 13 10/20/2019    STE6XGM 41 10/20/2019    KHCW2PLB 91 10/20/2019    FIO2 24.0 10/20/2019       Radiology:    No results found. All radiological studies reviewed                Code Status:  Full Code    Electronically signed by Esthela eHrmosillo MD on 6/25/2020 at 2:53 PM     Copy sent to Dr. Esthela Hermosillo MD    This note was created with the assistance of a speech-recognition program.  Although the intention is to generate a document that actually reflects the content of the visit, no guarantees can be provided that every mistake has been identified and corrected by editing. Note was updated later by me after  physical examination and  completion of the assessment.

## 2020-06-25 NOTE — PROGRESS NOTES
Patient admitted to PCU room 1021. Placed in droplet plus/airborne precautions with negative pressure room d/t Covid test pending. Oriented to room and call light. No distress noted. Patient brought bipap from home and is currently wearing at this time. Connected to telemetry. Will monitor.

## 2020-06-25 NOTE — PROGRESS NOTES
Dr. Yomaira Salinas rounded and updated on patient. Okay to feed patient now and keep NPO at midnight. Aware of trach plans tomorrow. No need to reorder home meds at this time.

## 2020-06-26 ENCOUNTER — APPOINTMENT (OUTPATIENT)
Dept: GENERAL RADIOLOGY | Age: 55
DRG: 004 | End: 2020-06-26
Attending: FAMILY MEDICINE
Payer: MEDICARE

## 2020-06-26 ENCOUNTER — ANESTHESIA (OUTPATIENT)
Dept: OPERATING ROOM | Age: 55
DRG: 004 | End: 2020-06-26
Payer: MEDICARE

## 2020-06-26 ENCOUNTER — ANESTHESIA EVENT (OUTPATIENT)
Dept: OPERATING ROOM | Age: 55
DRG: 004 | End: 2020-06-26
Payer: MEDICARE

## 2020-06-26 VITALS
RESPIRATION RATE: 12 BRPM | SYSTOLIC BLOOD PRESSURE: 86 MMHG | OXYGEN SATURATION: 96 % | DIASTOLIC BLOOD PRESSURE: 56 MMHG

## 2020-06-26 LAB
ANION GAP SERPL CALCULATED.3IONS-SCNC: 8 MMOL/L (ref 9–17)
BUN BLDV-MCNC: 18 MG/DL (ref 6–20)
BUN/CREAT BLD: 35 (ref 9–20)
CALCIUM SERPL-MCNC: 9.1 MG/DL (ref 8.6–10.4)
CHLORIDE BLD-SCNC: 101 MMOL/L (ref 98–107)
CO2: 32 MMOL/L (ref 20–31)
CREAT SERPL-MCNC: 0.51 MG/DL (ref 0.7–1.2)
FIO2: 40
GFR AFRICAN AMERICAN: >60 ML/MIN
GFR NON-AFRICAN AMERICAN: >60 ML/MIN
GFR SERPL CREATININE-BSD FRML MDRD: ABNORMAL ML/MIN/{1.73_M2}
GFR SERPL CREATININE-BSD FRML MDRD: ABNORMAL ML/MIN/{1.73_M2}
GLUCOSE BLD-MCNC: 85 MG/DL (ref 70–99)
HCT VFR BLD CALC: 39.6 % (ref 40.7–50.3)
HEMOGLOBIN: 12.6 G/DL (ref 13–17)
MCH RBC QN AUTO: 31 PG (ref 25.2–33.5)
MCHC RBC AUTO-ENTMCNC: 31.8 G/DL (ref 28.4–34.8)
MCV RBC AUTO: 97.5 FL (ref 82.6–102.9)
NEGATIVE BASE EXCESS, ART: ABNORMAL (ref 0–2)
NRBC AUTOMATED: 0 PER 100 WBC
O2 DEVICE/FLOW/%: ABNORMAL
PATIENT TEMP: 37
PDW BLD-RTO: 13.2 % (ref 11.8–14.4)
PLATELET # BLD: 126 K/UL (ref 138–453)
PMV BLD AUTO: 10.3 FL (ref 8.1–13.5)
POC HCO3: 29.3 MMOL/L (ref 22–27)
POC O2 SATURATION: 100 %
POC PCO2 TEMP: ABNORMAL MM HG
POC PCO2: 46 MM HG (ref 32–45)
POC PH TEMP: ABNORMAL
POC PH: 7.41 (ref 7.35–7.45)
POC PO2 TEMP: ABNORMAL MM HG
POC PO2: 206 MM HG (ref 75–95)
POSITIVE BASE EXCESS, ART: 5 (ref 0–2)
POTASSIUM SERPL-SCNC: 4.1 MMOL/L (ref 3.7–5.3)
RBC # BLD: 4.06 M/UL (ref 4.21–5.77)
SARS-COV-2, PCR: NORMAL
SARS-COV-2, RAPID: NOT DETECTED
SARS-COV-2: NORMAL
SODIUM BLD-SCNC: 141 MMOL/L (ref 135–144)
SOURCE: NORMAL
TCO2 (CALC), ART: 31 MMOL/L (ref 23–28)
WBC # BLD: 6.5 K/UL (ref 3.5–11.3)

## 2020-06-26 PROCEDURE — 2720000010 HC SURG SUPPLY STERILE: Performed by: OTOLARYNGOLOGY

## 2020-06-26 PROCEDURE — 3700000001 HC ADD 15 MINUTES (ANESTHESIA): Performed by: OTOLARYNGOLOGY

## 2020-06-26 PROCEDURE — 80048 BASIC METABOLIC PNL TOTAL CA: CPT

## 2020-06-26 PROCEDURE — 36415 COLL VENOUS BLD VENIPUNCTURE: CPT

## 2020-06-26 PROCEDURE — 94002 VENT MGMT INPAT INIT DAY: CPT

## 2020-06-26 PROCEDURE — 71045 X-RAY EXAM CHEST 1 VIEW: CPT

## 2020-06-26 PROCEDURE — 2709999900 HC NON-CHARGEABLE SUPPLY: Performed by: OTOLARYNGOLOGY

## 2020-06-26 PROCEDURE — 2500000003 HC RX 250 WO HCPCS: Performed by: OTOLARYNGOLOGY

## 2020-06-26 PROCEDURE — 0B113F4 BYPASS TRACHEA TO CUTANEOUS WITH TRACHEOSTOMY DEVICE, PERCUTANEOUS APPROACH: ICD-10-PCS | Performed by: OTOLARYNGOLOGY

## 2020-06-26 PROCEDURE — 94761 N-INVAS EAR/PLS OXIMETRY MLT: CPT

## 2020-06-26 PROCEDURE — 2580000003 HC RX 258: Performed by: NURSE ANESTHETIST, CERTIFIED REGISTERED

## 2020-06-26 PROCEDURE — 2000000000 HC ICU R&B

## 2020-06-26 PROCEDURE — 36600 WITHDRAWAL OF ARTERIAL BLOOD: CPT

## 2020-06-26 PROCEDURE — 85027 COMPLETE CBC AUTOMATED: CPT

## 2020-06-26 PROCEDURE — 6360000002 HC RX W HCPCS: Performed by: OTOLARYNGOLOGY

## 2020-06-26 PROCEDURE — 3600000012 HC SURGERY LEVEL 2 ADDTL 15MIN: Performed by: OTOLARYNGOLOGY

## 2020-06-26 PROCEDURE — 51702 INSERT TEMP BLADDER CATH: CPT

## 2020-06-26 PROCEDURE — 6360000002 HC RX W HCPCS: Performed by: INTERNAL MEDICINE

## 2020-06-26 PROCEDURE — 82803 BLOOD GASES ANY COMBINATION: CPT

## 2020-06-26 PROCEDURE — 2580000003 HC RX 258: Performed by: OTOLARYNGOLOGY

## 2020-06-26 PROCEDURE — 3600000002 HC SURGERY LEVEL 2 BASE: Performed by: OTOLARYNGOLOGY

## 2020-06-26 PROCEDURE — 6360000002 HC RX W HCPCS: Performed by: NURSE ANESTHETIST, CERTIFIED REGISTERED

## 2020-06-26 PROCEDURE — 5A1935Z RESPIRATORY VENTILATION, LESS THAN 24 CONSECUTIVE HOURS: ICD-10-PCS | Performed by: OTOLARYNGOLOGY

## 2020-06-26 PROCEDURE — 2500000003 HC RX 250 WO HCPCS: Performed by: NURSE ANESTHETIST, CERTIFIED REGISTERED

## 2020-06-26 PROCEDURE — 94770 HC ETCO2 MONITOR DAILY: CPT

## 2020-06-26 PROCEDURE — 3700000000 HC ANESTHESIA ATTENDED CARE: Performed by: OTOLARYNGOLOGY

## 2020-06-26 RX ORDER — IPRATROPIUM BROMIDE AND ALBUTEROL SULFATE 2.5; .5 MG/3ML; MG/3ML
1 SOLUTION RESPIRATORY (INHALATION) EVERY 4 HOURS PRN
Status: DISCONTINUED | OUTPATIENT
Start: 2020-06-26 | End: 2020-07-03 | Stop reason: HOSPADM

## 2020-06-26 RX ORDER — ONDANSETRON 2 MG/ML
INJECTION INTRAMUSCULAR; INTRAVENOUS PRN
Status: DISCONTINUED | OUTPATIENT
Start: 2020-06-26 | End: 2020-06-26 | Stop reason: SDUPTHER

## 2020-06-26 RX ORDER — FENTANYL CITRATE 50 UG/ML
INJECTION, SOLUTION INTRAMUSCULAR; INTRAVENOUS PRN
Status: DISCONTINUED | OUTPATIENT
Start: 2020-06-26 | End: 2020-06-26 | Stop reason: SDUPTHER

## 2020-06-26 RX ORDER — PHENYLEPHRINE HCL IN 0.9% NACL 1 MG/10 ML
SYRINGE (ML) INTRAVENOUS PRN
Status: DISCONTINUED | OUTPATIENT
Start: 2020-06-26 | End: 2020-06-26 | Stop reason: SDUPTHER

## 2020-06-26 RX ORDER — LIDOCAINE HYDROCHLORIDE AND EPINEPHRINE 10; 10 MG/ML; UG/ML
INJECTION, SOLUTION INFILTRATION; PERINEURAL PRN
Status: DISCONTINUED | OUTPATIENT
Start: 2020-06-26 | End: 2020-06-26 | Stop reason: ALTCHOICE

## 2020-06-26 RX ORDER — MIDAZOLAM HYDROCHLORIDE 1 MG/ML
INJECTION INTRAMUSCULAR; INTRAVENOUS PRN
Status: DISCONTINUED | OUTPATIENT
Start: 2020-06-26 | End: 2020-06-26 | Stop reason: SDUPTHER

## 2020-06-26 RX ORDER — PROPOFOL 10 MG/ML
10 INJECTION, EMULSION INTRAVENOUS
Status: DISCONTINUED | OUTPATIENT
Start: 2020-06-26 | End: 2020-06-27

## 2020-06-26 RX ORDER — FENTANYL CITRATE 50 UG/ML
50 INJECTION, SOLUTION INTRAMUSCULAR; INTRAVENOUS
Status: DISCONTINUED | OUTPATIENT
Start: 2020-06-26 | End: 2020-07-03 | Stop reason: HOSPADM

## 2020-06-26 RX ORDER — SODIUM CHLORIDE 9 MG/ML
INJECTION, SOLUTION INTRAVENOUS CONTINUOUS PRN
Status: DISCONTINUED | OUTPATIENT
Start: 2020-06-26 | End: 2020-06-26 | Stop reason: SDUPTHER

## 2020-06-26 RX ORDER — PROPOFOL 10 MG/ML
INJECTION, EMULSION INTRAVENOUS PRN
Status: DISCONTINUED | OUTPATIENT
Start: 2020-06-26 | End: 2020-06-26 | Stop reason: SDUPTHER

## 2020-06-26 RX ORDER — LIDOCAINE HYDROCHLORIDE 20 MG/ML
INJECTION, SOLUTION EPIDURAL; INFILTRATION; INTRACAUDAL; PERINEURAL PRN
Status: DISCONTINUED | OUTPATIENT
Start: 2020-06-26 | End: 2020-06-26 | Stop reason: SDUPTHER

## 2020-06-26 RX ORDER — PROPOFOL 10 MG/ML
10 INJECTION, EMULSION INTRAVENOUS
Status: DISCONTINUED | OUTPATIENT
Start: 2020-06-26 | End: 2020-06-26 | Stop reason: SDUPTHER

## 2020-06-26 RX ADMIN — LIDOCAINE HYDROCHLORIDE 80 MG: 20 INJECTION, SOLUTION EPIDURAL; INFILTRATION; INTRACAUDAL; PERINEURAL at 15:03

## 2020-06-26 RX ADMIN — PROPOFOL 180 MG: 10 INJECTION, EMULSION INTRAVENOUS at 15:03

## 2020-06-26 RX ADMIN — Medication 200 MCG: at 15:08

## 2020-06-26 RX ADMIN — Medication 100 MCG: at 15:03

## 2020-06-26 RX ADMIN — PROPOFOL 40 MCG/KG/MIN: 10 INJECTION, EMULSION INTRAVENOUS at 19:43

## 2020-06-26 RX ADMIN — ONDANSETRON 4 MG: 2 INJECTION, SOLUTION INTRAMUSCULAR; INTRAVENOUS at 15:32

## 2020-06-26 RX ADMIN — MIDAZOLAM 2 MG: 1 INJECTION INTRAMUSCULAR; INTRAVENOUS at 14:58

## 2020-06-26 RX ADMIN — PROPOFOL 50 MCG/KG/MIN: 10 INJECTION, EMULSION INTRAVENOUS at 22:08

## 2020-06-26 RX ADMIN — SODIUM CHLORIDE: 9 INJECTION, SOLUTION INTRAVENOUS at 14:58

## 2020-06-26 RX ADMIN — FENTANYL CITRATE 50 MCG: 0.05 INJECTION, SOLUTION INTRAMUSCULAR; INTRAVENOUS at 21:55

## 2020-06-26 RX ADMIN — CEFAZOLIN 1 G: 1 INJECTION, POWDER, FOR SOLUTION INTRAMUSCULAR; INTRAVENOUS at 17:22

## 2020-06-26 ASSESSMENT — PULMONARY FUNCTION TESTS
PIF_VALUE: 1
PIF_VALUE: 11
PIF_VALUE: 12
PIF_VALUE: 11
PIF_VALUE: 12
PIF_VALUE: 15
PIF_VALUE: 12
PIF_VALUE: 2
PIF_VALUE: 13
PIF_VALUE: 19
PIF_VALUE: 1
PIF_VALUE: 13
PIF_VALUE: 11
PIF_VALUE: 12
PIF_VALUE: 1
PIF_VALUE: 11
PIF_VALUE: 0
PIF_VALUE: 15
PIF_VALUE: 12
PIF_VALUE: 11
PIF_VALUE: 1
PIF_VALUE: 12
PIF_VALUE: 0
PIF_VALUE: 1
PIF_VALUE: 12
PIF_VALUE: 20
PIF_VALUE: 12
PIF_VALUE: 12
PIF_VALUE: 2
PIF_VALUE: 18
PIF_VALUE: 21
PIF_VALUE: 11
PIF_VALUE: 11
PIF_VALUE: 12
PIF_VALUE: 13
PIF_VALUE: 11
PIF_VALUE: 12
PIF_VALUE: 12
PIF_VALUE: 13
PIF_VALUE: 0
PIF_VALUE: 0
PIF_VALUE: 13
PIF_VALUE: 12
PIF_VALUE: 12
PIF_VALUE: 11
PIF_VALUE: 10
PIF_VALUE: 12

## 2020-06-26 ASSESSMENT — PAIN SCALES - GENERAL
PAINLEVEL_OUTOF10: 0

## 2020-06-26 ASSESSMENT — ENCOUNTER SYMPTOMS: SHORTNESS OF BREATH: 1

## 2020-06-26 NOTE — PROGRESS NOTES
Called lab to check on pt's covid results. Specimen was never received according to lab. Checked with . 's lab as well and they never received specimen. [facility] notified and states pt will need rapid covid swab in AM. Pt remains in droplet + isolation.

## 2020-06-26 NOTE — CARE COORDINATION
Case Management Initial Discharge Plan  Sherry Reddy,         Readmission Risk              Risk of Unplanned Readmission:        9             Met with:patient or spouse/SO to discuss discharge plans. Information verified: address, contacts, phone number, , insurance Yes  PCP: Britney Bolanos MD  Date of last visit: 2020    Insurance Provider: Medicare/ Medicaid    Discharge Planning  Current Residence:  Private home  Living Arrangements:  Spouse/Significant Other   Home has 1 stories/5 stairs to climb  Support Systems:  Spouse/Significant Other  Current Services PTA:  none Agency: Previous use of [employer]  Patient able to perform ADL's:Assisted  DME in home:  Walker, cane, noninvasive Triology, O2 concentrator with no portability from [employer]  DME used to aid ambulation prior to admission:   cane  DME used during admission:  Oxygen, trilogy    Potential Assistance Needed:  [address]: [place] on [address] Medications:  No  Does patient want to participate in local refill/ meds to beds program?  No    Patient agreeable to home care: Yes  Freedom of choice provided:  yes  The Plan for Transition of Care is related to the following treatment goals: skilled nurse visits to teach family trach care    The Patient and/or patient representative wife Doni Padilla was provided with a choice of provider and agrees   with the discharge plan. [x] Yes [] No    Freedom of choice list was provided with basic dialogue that supports the patient's individualized plan of care/goals, treatment preferences and shares the quality data associated with the providers.  [x] Yes [] No      Type of Home Care Services:  None  Patient expects to be discharged to:  home    Prior SNF/Rehab Placement and Facility: none  Agreeable to SNF/Rehab: No  [place]vin of choice provided: n/a   Evaluation: n/a    Expected Discharge date:  20  Follow Up Appointment: Best Day/ Time: Monday AM    Transportation provider: wife  Transportation arrangements needed for discharge: No    Discharge Plan:   Met with patient and his wife to review discharge plan. Pt has ALS and due to respiratory difficulty is going for elective tracheostomy 6-26. Wife provides assistance to pt. Pt has oxygen concentrator from [employer] phone [phone] [place] office and fax [phone]  Phone call to [facility] and spoke with Mima Nelson and she has an appt scheduled with pt's wife 6-29 to teach her how to attach trilogy to pt's trach. Pt will also need trach supplies and suction machine and [employer] can provide all these supplies. [facility] faxed over orders for these supplies and they need DR Cruz's signature as he was the initial DR on this case. Sticky note to Dr Ana Macias and orders placed in front of pt's chart. If pt needs nutritional supplements order dietary consult and pt's Medicaid should cover and use [place].    Face sheet faxed to [employer]    Will need new order for oxygen at home as pt's insurance has changed to Medicare.     JEY initiated for [facility] signed by Andrew Lazar on 6/26/20 at 12:38 PM EDT

## 2020-06-26 NOTE — PROGRESS NOTES
Writer spoke with pt's wife Jean Claude Anton and provided update. Informed of trach placement scheduled for 2:30 pm tomorrow per Rosemary Hsu. She will be bringing pt's ALS med from [facility].

## 2020-06-26 NOTE — ANESTHESIA POSTPROCEDURE EVALUATION
Department of Anesthesiology  Postprocedure Note    Patient: Dwight Bland  MRN: [id number]  YOB: 1965  Date of evaluation: 6/26/2020  Time:  4:44 PM     Procedure Summary     Date:  06/26/20 Room / Location:  [place] [address] / [facility] - INPATIENT    Anesthesia Start:  1953 Anesthesia Stop:  0118    Procedure:  TRACHEOSTOMY PERCUTANEOUS (N/A ) Diagnosis:  (reap failue)    Surgeon:  Dottie Herring MD Responsible Provider:  Chau Frank DO    Anesthesia Type:  general ASA Status:  4          Anesthesia Type: general    Yana Phase I:      Yana Phase II:      Last vitals: Reviewed and per EMR flowsheets.        Anesthesia Post Evaluation    Patient location during evaluation: ICU  Patient participation: complete - patient cannot participate  Level of consciousness: sedated and ventilated  Airway patency: patent  Nausea & Vomiting: no vomiting  Complications: no  Cardiovascular status: hemodynamically stable  Respiratory status: ventilator  Hydration status: stable  Comments: No NMB used during the case, patient brought to ICU with trach in place on vent

## 2020-06-26 NOTE — PROGRESS NOTES
Occupational [address]  Occupational Therapy Not Seen Note    Patient not available for Occupational Therapy due to:    [] Testing:    [] Hemodialysis    [] Cancelled by RN:    []Refusal by Patient:    [] Surgery:     [] Intubation:     [] Pain Medication:    [] Sedation:     [] Spine Precautions :    [x] Medical Instability: RN reports to hold OT evaluation due to awaiting COVID test results and pm procedure.      [] Other:

## 2020-06-26 NOTE — PROGRESS NOTES
Writer updated wife Susanancluis angel Powernoah that rapid covid swab will be done this AM and she can visit once results come back negative. Will update day shift RN to call [facility] when pt out of droplet + isolation.

## 2020-06-26 NOTE — DISCHARGE INSTR - COC
Continuity of Care Form    Patient Name: Sejal Christensen   :  1965  MRN:  [id number]    Admit date:  2020  Discharge date:  7/3/2020    Code Status Order: Full Code   Advance Directives:   [facility] Documentation     Date/Time Healthcare Directive Type of Healthcare Directive Copy in [facility] [address] Agent's Name Healthcare Agent's Phone Number    [phone]  Yes, patient has an advance directive for healthcare treatment  --  --  --  --  --          Admitting Physician:  Sanam Miranda MD  PCP: Sanam Miranda MD    [facility]-01  Discharging Unit Phone Number: [phone]    Emergency Contact:   Extended Emergency Contact Information  Primary Emergency Contact: [facility]  Address: [address] [place] [address] Phone: [phone]  Work Phone: [phone]  Mobile Phone: [phone]  Relation: Spouse   needed?  No    Past Surgical History:  Past Surgical History:   Procedure Laterality Date    APPENDECTOMY      COLONOSCOPY  2017    CYSTOSCOPY  10/05/2016    LITHOTRIPSY Right 10/19/2016    LUMBAR FUSION  2018    LUMBAR INTERBODY FUSION POSTERIOR L3-4    [place] LUMBAR SPINE FUSN,POST INTRBDY N/A 2018    LUMBAR INTERBODY FUSION POSTERIOR L3-4,  ([address] - OFFICE TO NOTIFY, ROTATING AARON TABLE, C-ARM) performed by Jamaal Galvan MD at [place] [address]       Immunization History:   Immunization History   Administered Date(s) Administered    Influenza, Triv, 3 Years and older, IM, PF (Afluria 5yrs and older) 10/06/2016       Active Problems:  Patient Active Problem List   Diagnosis Code    ureteral calculus ,right N13.30    Bradycardia R00.1    Renal calculus, right N20.0    Lumbar stenosis with neurogenic claudication M48.062    Respiratory failure ([address] J96.90    Acute respiratory failure with hypercapnia ([address] J96.02    Neuromuscular Roberto Garcia  is necessary for the continuing treatment of the diagnosis listed and that he requires [address] for greater 30 days.      Update Admission H&P: No change in H&P    PHYSICIAN SIGNATURE:  Electronically signed by Katherin Carvalho MD on 6/30/20 at 3:54 PM EDT

## 2020-06-26 NOTE — PROGRESS NOTES
Patient transferred from OR to floor. Anesthesiology, CRNA and PACU nurse accompanied. Tracheostomy sheet reviewed. Patient has a # 6 Shiley , cuffed. Obturator and original Tracheostomy box present in the room.

## 2020-06-26 NOTE — PROGRESS NOTES
Aric Revolucijluis angel [facility] Hospitalist        2020   5:10 PM    Name:  Luis Carrion  MRN:    [id number]     [place]:     [de-identified]   Room:  [address] [place] [address] Day: 1     Admit Date: 2020  2:39 PM  PCP: Ankur Davis MD    C/C: No chief complaint on file. Assessment:      · Acute on chronic respiratory failure  · Hypoxia  · Amyotrophic lateral sclerosis  · Progressive weakness  · Dysarthria  · Osteoarthritis multiple joints  · Low back pain  · Nephrolithiasis  · Tracheostomy dated 2020        Plan:        · Admit to progressive  · Monitor vitals closely  · Keep SPO2 above 90%  · BiPAP  · RT eval  · O2 as needed  · DuoNeb every 4 hours as needed  · IV fluids-saline at 75 mL's per hour  · Diet dental soft  · N.p.o. after midnight  · Hold trazodone  · Resume Rilutek  · Famotidine  · Pain control  · Antiemetics PRN  · Chest x-ray  · Sputum Gram stain  · Respiratory culture  · Check COVID-19  · CBC, BMP  · Consult ENT  · Consult pulmonology  · Incentive spirometry  · Lovenox  · Consult PT/OT  · DVT and GI prophylaxis  · Presently on propofol and on mechanical ventilation  · Discussed with nursing      Subjective:     Patient seen and examined at bedside. No overnight events. No acute complaints today. Afebrile. In no apparent distress    ROS:  Unable to obtain since patient sedated. Physical Examination:      Vitals:  /78   Pulse 73   Temp 97.3 °F (36.3 °C) (Axillary)   Resp 16   Ht 5' 10\" (1.778 m)   Wt 125 lb (56.7 kg)   SpO2 94%   BMI 17.94 kg/m²   Temp (24hrs), Av.6 °F (36.4 °C), Min:97.3 °F (36.3 °C), Max:97.9 °F (36.6 °C)    Weight:   Wt Readings from Last 3 Encounters:   20 125 lb (56.7 kg)   20 130 lb (59 kg)   20 130 lb (59 kg)     I/O last 3 completed shifts:  I/O last 3 completed shifts: In: 18 [I.V.:867]  Out: 320 [Urine:320]     No results for input(s): POCGLU in the last 72 hours.       General appearance -sedated and in no acute distress  Mental status -on vent  Head - normocephalic and atraumatic  Eyes - pupils equal and reactive,  conjunctiva clear  Ears -external ears unremarkable  Nose - no drainage noted  Mouth - mucous membranes moist  Neck - supple, no carotid bruits, thyroid not palpable  Chest - clear to auscultation, normal effort  Heart - normal rate, regular rhythm, no murmur  Abdomen - soft,   Neurological -sedated on vent  Extremities - peripheral pulses palpable, no pedal edema   Skin - no gross lesions, rashes, or induration noted, tattoos        Medications: Allergies:    Allergies   Allergen Reactions    Sulfa Antibiotics Other (See Comments)     As child (unknown reaction)       Current Meds:   Current Facility-Administered Medications:     [MAR Hold] ipratropium-albuterol (DUONEB) nebulizer solution 1 ampule, 1 ampule, Inhalation, Q4H PRN, Alhaji Weston, APRN - CNP    ceFAZolin (ANCEF) 1 g in dextrose 5 % 50 mL IVPB (mini-bag), 1 g, Intravenous, Q8H, Esequiel Khanna MD    [facility] Hold] 0.9 % sodium chloride infusion, , Intravenous, Continuous, Zeynep Painter MD, Last Rate: 75 mL/hr at 06/25/20 1759    [MAR Hold] sodium chloride flush 0.9 % injection 10 mL, 10 mL, Intravenous, 2 times per day, Chiki Jose MD    [facility] Hold] sodium chloride flush 0.9 % injection 10 mL, 10 mL, Intravenous, PRN, Zeynep Painter MD    [facility] Hold] acetaminophen (TYLENOL) tablet 650 mg, 650 mg, Oral, Q6H PRN **OR** [MAR Hold] acetaminophen (TYLENOL) suppository 650 mg, 650 mg, Rectal, Q6H PRN, Zyenep Painter MD    [facility] Hold] polyethylene glycol (GLYCOLAX) packet 17 g, 17 g, Oral, Daily PRN, Zeynep Painter MD    [MAR Hold] promethazine (PHENERGAN) tablet 12.5 mg, 12.5 mg, Oral, Q6H PRN **OR** [MAR Hold] ondansetron (ZOFRAN) injection 4 mg, 4 mg, Intravenous, Q6H PRN, Chiki Jose MD    [MAR Hold] famotidine (PEPCID) tablet 20 mg, 20 mg, Oral, Daily, Zeynep Painter MD    [MAR Hold] nicotine (NICODERM CQ) 21 MG/24HR 1 patch, 1 patch, Transdermal, Daily PRN, Samantha Shine MD    [facility] Hold] enoxaparin (LOVENOX) injection 40 mg, 40 mg, Subcutaneous, Daily, Zeynep Painter MD    [facility] Hold] albuterol (PROVENTIL) nebulizer solution 2.5 mg, 2.5 mg, Nebulization, Q2H PRN, Samantha Shine MD    [facility] Hold] riluzole (RILUTEK) tablet 50 mg, 50 mg, Oral, Q12H, Zeynep Painter MD, 50 mg at 06/25/20 2211      I/O (24Hr): Intake/Output Summary (Last 24 hours) at 6/26/2020 1710  Last data filed at 6/26/2020 1547  Gross per 24 hour   Intake 1067 ml   Output 320 ml   Net 747 ml       Data:           Labs:    Hematology:  Recent Labs     06/26/20  0516   WBC 6.5   RBC 4.06*   HGB 12.6*   HCT 39.6*   MCV 97.5   MCH 31.0   MCHC 31.8   RDW 13.2   *   MPV 10.3     Chemistry:  Recent Labs     06/26/20  0516      K 4.1      CO2 32*   GLUCOSE 85   BUN 18   CREATININE 0.51*   ANIONGAP 8*   LABGLOM >60   GFRAA >60   CALCIUM 9.1     No results for input(s): PROT, LABALBU, LABA1C, S3ACKTI, Z8CUUQK, FT4, TSH, AST, ALT, LDH, GGT, ALKPHOS, LABGGT, BILITOT, BILIDIR, AMMONIA, AMYLASE, LIPASE, LACTATE, CHOL, HDL, LDLCHOLESTEROL, CHOLHDLRATIO, TRIG, VLDL, LTF13YQ, PHENYTOIN, PHENYF, URICACID, POCGLU in the last 72 hours. Lab Results   Component Value Date/Time    SPECIAL 7ML RT [facility] 10/18/2019 04:47 AM     Lab Results   Component Value Date/Time    CULTURE NO GROWTH 6 DAYS 10/18/2019 04:47 AM       Lab Results   Component Value Date    POCPH 7.33 10/20/2019    POCPCO2 74 10/20/2019    POCPO2 70 10/20/2019    POCHCO3 39.1 10/20/2019    NBEA NOT REPORTED 10/20/2019    PBEA 13 10/20/2019    CCD8DGV 41 10/20/2019    NTDK5PST 91 10/20/2019    FIO2 24.0 10/20/2019       Radiology:    Xr Chest Portable    Result Date: 6/26/2020  No acute cardiopulmonary disease. Tracheostomy in appropriate position.      Xr Chest Portable    Result Date: 6/26/2020  Negative portable chest.         All radiological studies reviewed  Code Status:  Full Code        Electronically signed by April Duarte MD on 6/26/2020 at 5:10 PM    This note was created with the assistance of a speech-recognition program.  Although the intention is to generate a document that actually reflects the content of the visit, no guarantees can be provided that every mistake has been identified and corrected by editing. Note was updated later by me after  physical examination and  completion of the assessment.

## 2020-06-26 NOTE — PLAN OF CARE
Problem: Breathing Pattern - Ineffective:  Goal: Ability to achieve and maintain a regular respiratory rate will improve  Description: Ability to achieve and maintain a regular respiratory rate will improve  Outcome: Ongoing   Patient has hx of ALS with worsening breathing issues recently. Pulmonary following. ENT on board and plans for trach today. Patients oxygen sats WNL on home bipap. Lung sounds diminished. Will monitor.

## 2020-06-26 NOTE — CONSULTS
Pulmonary Medicine and [address] MD      Patient - Zahra Florez   MRN -  [id number]   Jewel # - [de-identified]   - 1965      Date of Admission -  2020  2:39 PM  Date of evaluation -  2020  Room - [address] [place] [address]   Erin Herr MD Primary Care Physician - Lexy Salvador MD     Reason for Consult    Respiratory failure    Assessment   · Acute on chronic hypercapnic respiratory failure  · ALS  · Remote smoking history    Recommendations   · Continue IV fluids  · Make albuterol and Ipratropium Q 4 hours prn  · Rilutek   · Labs: CBC and BMP in am  · Continue home trilogy until tracheostomy placed  · Secondary to patient's chest wall muscle weakness due to his ALS, he has not been able to sustain adequate oxygenation despite trilogy and will need tracheostomy to avoid rapid deterioration   · ENT consulted, plans for elective tracheostomy later today  · DVT prophylaxis with low molecular weight heparin  · Discussed with RN/RT  · Will follow with you    Problem List      Patient Active Problem List   Diagnosis    ureteral calculus ,right    Bradycardia    Renal calculus, right    Lumbar stenosis with neurogenic claudication    Respiratory failure ([address]    Acute respiratory failure with hypercapnia (HCC)    Neuromuscular disorder (HCC)    Severe malnutrition ([address]    Atelectasis    Tracheobronchitis    Acute respiratory failure ([address]       HPI     Zahra Florez is 53 y.o.,  male, admitted because of respiratory failure. He was seen in the office yesterday by Philip Gao CNP who contacted Dr. Adriane Mcdonald with concerns of rapid deterioration of the patient secondary to his chest wall muscle weakness due to his ALS. He has not been able to sustain adequate oxygenation despite increasing his trilogy settings and his only option would be for tracheostomy.   He was therefore sent to hospital as direct admission for further evaluation.     PMHx   Past Medical History      Diagnosis Date    [facility] ([facility]) [facility]     Back pain     on 10/18/19 pt denies pain mgmnt    Degenerative disc disease       Past Surgical History        Procedure Laterality Date    APPENDECTOMY      COLONOSCOPY  2017    CYSTOSCOPY  10/05/2016    LITHOTRIPSY Right 10/19/2016    LUMBAR FUSION  2018    LUMBAR INTERBODY FUSION POSTERIOR L3-4    [place] LUMBAR SPINE FUSN,POST INTRBDY N/A 2018    LUMBAR INTERBODY FUSION POSTERIOR L3-4,  ([facility] - OFFICE TO NOTIFY, ROTATING AARON TABLE, C-ARM) performed by Tete Adams MD at [address]    Current Medications    sodium chloride flush  10 mL Intravenous 2 times per day    famotidine  20 mg Oral Daily    enoxaparin  40 mg Subcutaneous Daily    ipratropium-albuterol  1 ampule Inhalation Q4H [place]    riluzole  50 mg Oral Q12H     sodium chloride flush, acetaminophen **OR** acetaminophen, polyethylene glycol, promethazine **OR** ondansetron, nicotine, albuterol  IV Drips/Infusions   sodium chloride 75 mL/hr at 20 1819     Home Medications  Medications Prior to Admission: traZODone (DESYREL) 50 MG tablet, Take 50 mg by mouth nightly as needed for Sleep  riluzole (RILUTEK) 50 MG tablet, Take 50 mg by mouth every 12 hours     Allergies    Sulfa antibiotics  Social History     Social History     Tobacco Use    Smoking status: Former Smoker     Years: 5.00     Types: Cigarettes     Last attempt to quit:      Years since quittin.5    Smokeless tobacco: Never Used   Substance Use Topics    Alcohol use: Not Currently     Comment: very seldom     Family History          Problem Relation Age of Onset    High Blood Pressure Mother     Diabetes Mother     Heart Disease Mother      ROS - 6 systems   General Denies any fever or chills  HEENT Denies any diplopia, tinnitus or vertigo  Resp positive for  dyspnea  Cardiac Denies any chest pain, palpitations, claudication or edema  GI Denies any melena, hematochezia, hematemesis or pyrosis   Denies any frequency, urgency, hesitancy or incontinence  Heme Denies bruising or bleeding easily  Endocrine Denies any history of diabetes or thyroid disease  Neuro Denies any focal motor or sensory deficits  Psychiatric Denies anxiety, depression, suicidal ideation  Skin Denies rashes, itching, open sores  Vitals     height is 5' 10\" (1.778 m) and weight is 125 lb (56.7 kg). His oral temperature is 97.9 °F (36.6 °C). His blood pressure is 131/83 and his pulse is 77. His respiration is 16 and oxygen saturation is 93%. Body mass index is 17.94 kg/m². I/O        Intake/Output Summary (Last 24 hours) at 6/26/2020 1017  Last data filed at 6/26/2020 0618  Gross per 24 hour   Intake 867 ml   Output 320 ml   Net 547 ml     I/O last 3 completed shifts: In: 867 [I.V.:867]  Out: 320 [Urine:320]   Patient Vitals for the past 96 hrs (Last 3 readings):   Weight   06/25/20 1730 125 lb (56.7 kg)     Exam   General Appearance  Awake, alert, oriented, in no acute distress  HEENT - Head is normocephalic, atraumatic. Pupil reactive to light  Neck - Supple, trachea midline and straight  Lungs - decreased air exchange  Cardiovascular - Heart sounds are normal.  Regular rhythm normal rate without murmur, gallop or rub. Abdomen - Soft, nontender, nondistended, no masses or organomegaly  Neurologic - CN II-XII are grossly intact.  There are no focal motor or sensory deficits  Skin - No bruising or bleeding  Extremities - No cyanosis, clubbing or edema    Labs  - Old records and notes have been reviewed in [facility]   CBC     Lab Results   Component Value Date    WBC 6.5 06/26/2020    RBC 4.06 06/26/2020    HGB 12.6 06/26/2020    HCT 39.6 06/26/2020     06/26/2020    MCV 97.5 06/26/2020    MCH 31.0 06/26/2020    MCHC 31.8 06/26/2020    RDW 13.2 06/26/2020    LYMPHOPCT 11 06/07/2020    MONOPCT 6 06/07/2020    BASOPCT 1 06/07/2020

## 2020-06-26 NOTE — PROGRESS NOTES
Physical Therapy  DATE: 2020    NAME: Giovanny Desir  MRN: [id number]   : 1965    Patient not seen this date for Physical Therapy due to:  [] Blood transfusion in progress  [x] Cancel by RN, pt reporting he is SOB  [] Hemodialysis  []  Refusal by Patient   [] Spine Precautions   [] Strict Bedrest  [] Surgery  [] Testing      [] Other        [] PT being discontinued at this time. Patient independent. No further needs. [] PT being discontinued at this time as the patient has been transferred to hospice care. No further needs.     Erik Branch, PT

## 2020-06-26 NOTE — PROGRESS NOTES
Wife , Krysta Adam was brought back to the room. Teagan updated on ventilator and POC. Writer gave wife unit phone number so that she could call for updates.

## 2020-06-26 NOTE — ANESTHESIA PRE PROCEDURE
Department of Anesthesiology  Preprocedure Note       Name:  Hannah Rubio   Age:  53 y.o.  :  1965                                          MRN:  [id number]         Date:  2020      Surgeon: Dakotah Gasca):  Toro Hutchinson MD    Procedure: Procedure(s):  TRACHEOSTOMY PERCUTANEOUS    Medications prior to admission:   Prior to Admission medications    Medication Sig Start Date End Date Taking?  Authorizing Provider   traZODone (DESYREL) 50 MG tablet Take 50 mg by mouth nightly as needed for Sleep    Historical Provider, MD   riluzole (RILUTEK) 50 MG tablet Take 50 mg by mouth every 12 hours  20   Historical Provider, MD       Current medications:    Current Facility-Administered Medications   Medication Dose Route Frequency Provider Last Rate Last Dose    [MAR Hold] ipratropium-albuterol (DUONEB) nebulizer solution 1 ampule  1 ampule Inhalation Q4H PRN DECLAN Deras - CNP        ceFAZolin (ANCEF) 1 g in dextrose 5 % 50 mL IVPB (mini-bag)  1 g Intravenous Ez Gordillo MD        [facility] Hold] 0.9 % sodium chloride infusion   Intravenous Continuous Zeynep Painter MD 75 mL/hr at 20 175      [MAR Hold] sodium chloride flush 0.9 % injection 10 mL  10 mL Intravenous 2 times per day Yasir Vital MD       [facility] Hold] sodium chloride flush 0.9 % injection 10 mL  10 mL Intravenous PRN Yasir Vital MD        [MAR Hold] acetaminophen (TYLENOL) tablet 650 mg  650 mg Oral Q6H PRN Yasir Vital MD        Or   [facility] Hold] acetaminophen (TYLENOL) suppository 650 mg  650 mg Rectal Q6H PRN Yasir Vital MD        [facility] Hold] polyethylene glycol (GLYCOLAX) packet 17 g  17 g Oral Daily PRN Yasir Vital MD        [MAR Hold] promethazine (PHENERGAN) tablet 12.5 mg  12.5 mg Oral Q6H PRN Yasir Vital MD        Or   [facility] Hold] ondansetron (ZOFRAN) injection 4 mg  4 mg Intravenous Q6H PRN Yasir Vital MD        [MAR Hold] famotidine (PEPCID) tablet 20 mg  20 mg Oral Daily Yasir Vital MD  [MAR Hold] nicotine (NICODERM CQ) 21 MG/24HR 1 patch  1 patch Transdermal Daily PRN Guillermo Ureña MD        [MAR Hold] enoxaparin (LOVENOX) injection 40 mg  40 mg Subcutaneous Daily Guillermo Ureña MD        [MAR Hold] albuterol (PROVENTIL) nebulizer solution 2.5 mg  2.5 mg Nebulization Q2H PRN Zeynep Painter MD        [facility] Hold] riluzole (RILUTEK) tablet 50 mg  50 mg Oral Q12H Zeynep Painter MD   50 mg at 06/25/20 2211       Allergies:     Allergies   Allergen Reactions    Sulfa Antibiotics Other (See Comments)     As child (unknown reaction)       Problem List:    Patient Active Problem List   Diagnosis Code    ureteral calculus ,right N13.30    Bradycardia R00.1    Renal calculus, right N20.0    Lumbar stenosis with neurogenic claudication M48.062    Respiratory failure ([facility]) J96.90    Acute respiratory failure with hypercapnia ([facility]) J96.02    Neuromuscular disorder ([facility]) G70.9    Severe malnutrition ([facility]) E43    Atelectasis J98.11    Tracheobronchitis J40    Acute respiratory failure ([facility]) J96.00       Past Medical History:        Diagnosis Date    [facility] sclerosis ([facility]) [facility]     Back pain     on 10/18/19 pt denies pain mgmnt    Degenerative disc disease        Past Surgical History:        Procedure Laterality Date    APPENDECTOMY      COLONOSCOPY  2017    CYSTOSCOPY  10/05/2016    LITHOTRIPSY Right 10/19/2016    LUMBAR FUSION  05/30/2018    LUMBAR INTERBODY FUSION POSTERIOR L3-4    [place] LUMBAR SPINE FUSN,POST INTRBDY N/A 5/30/2018    LUMBAR INTERBODY FUSION POSTERIOR L3-4,  ([address] - OFFICE TO NOTIFY, ROTATING AARON TABLE, C-ARM) performed by Dada Cristina MD at [address] [place] [address] N/A 6/26/2020    TRACHEOSTOMY PERCUTANEOUS performed by Vicki Alvarenga MD at [facility] History:    Social History     Tobacco Use    Smoking status: Former Smoker     Years: 5.00     Types: Cigarettes     Last attempt to quit: 1989     Years since

## 2020-06-26 NOTE — CONSULTS
Department   of   Otolaryngology    Assessment:   Acute on Chronic Respiratory failure   Amyotrophic lateral sclerosis with progressive weakness   Dysarthria    Plan:  -considering his progressive respiratory and neurologic decline, he would benefit from a tracheostomy  -risks, benefits and alternatives were discussed in detail.    -Informed consent obtained from patient witnessed by wife    CHIEF   COMPLAINT:      Trouble breathing    CONSULTING PHYSICIAN:  Dr. Cee Prom:                            Ame Jimenez      is   a   53 y.o.   male   with   significant   past   medical   history   of ALS who presents with difficulty in breathing progressively over the last 2-3 weeks. Patient was placed on BiPAP which was initially needed at night. Over the last 2 weeks or so the patient has needed BiPAP during the day also. Patient says he has had cough which has been congested in addition. He says since yesterday he felt significant worsening. Does not have any fever headache or facial pressure. Denies any diarrhea or abdominal pain. And has had episodes of orthopnea and paroxysmal nocturnal dyspnea within the several days. Patient called his pulmonologist who adjusted his settings initially which have not been effective. Currently patient denies any chest pain, wheezing, fever, nausea, vomiting or abdominal pain. Denies any diarrhea or constipation. Denies rash or joint swelling.   Patient has had progressive weight loss progressive severe weakness    Past   Medical   History:       Diagnosis Date    Amyotrophic lateral sclerosis (ALS) ([address]     Back pain     on 10/18/19 pt denies pain mgmnt    Degenerative disc disease           Surgical   History:       Procedure Laterality Date    APPENDECTOMY      COLONOSCOPY  2017    CYSTOSCOPY  10/05/2016    LITHOTRIPSY Right 10/19/2016    LUMBAR FUSION  05/30/2018    LUMBAR INTERBODY FUSION POSTERIOR L3-4    ID LUMBAR SPINE JAYDE,POST INTRBDY N/A 2018    LUMBAR INTERBODY FUSION POSTERIOR L3-4,  (LESLYE & TELLURIDE - OFFICE TO NOTIFY, ROTATING AARON TABLE, C-ARM) performed by Pravin Ramos MD at [facility]          Medications: Current Facility-Administered Medications: ipratropium-albuterol (DUONEB) nebulizer solution 1 ampule, 1 ampule, Inhalation, Q4H PRN  0.9 % sodium chloride infusion, , Intravenous, Continuous  sodium chloride flush 0.9 % injection 10 mL, 10 mL, Intravenous, 2 times per day  sodium chloride flush 0.9 % injection 10 mL, 10 mL, Intravenous, PRN  acetaminophen (TYLENOL) tablet 650 mg, 650 mg, Oral, Q6H PRN **OR** acetaminophen (TYLENOL) suppository 650 mg, 650 mg, Rectal, Q6H PRN  polyethylene glycol (GLYCOLAX) packet 17 g, 17 g, Oral, Daily PRN  promethazine (PHENERGAN) tablet 12.5 mg, 12.5 mg, Oral, Q6H PRN **OR** ondansetron (ZOFRAN) injection 4 mg, 4 mg, Intravenous, Q6H PRN  famotidine (PEPCID) tablet 20 mg, 20 mg, Oral, Daily  nicotine (NICODERM CQ) 21 MG/24HR 1 patch, 1 patch, Transdermal, Daily PRN  enoxaparin (LOVENOX) injection 40 mg, 40 mg, Subcutaneous, Daily  albuterol (PROVENTIL) nebulizer solution 2.5 mg, 2.5 mg, Nebulization, Q2H PRN  riluzole (RILUTEK) tablet 50 mg, 50 mg, Oral, Q12H     Allergies:      Sulfa antibiotics    Social History:    Social History     Socioeconomic History    Marital status:      Spouse name: Not on file    Number of children: Not on file    Years of education: Not on file    Highest education level: Not on file   Occupational History    Not on file   Social Needs    Financial resource strain: Not on file    Food insecurity     Worry: Not on file     Inability: Not on file   [employer] needs     Medical: Not on file     Non-medical: Not on file   Tobacco Use    Smoking status: Former Smoker     Years: 5.00     Types: Cigarettes     Last attempt to quit:      Years since quittin.5    Smokeless tobacco: Never Used   Substance and extremities  BEHAVIOR/PSYCH:  negative for depression, anxiety    DIAGNOSTIC STUDIES REVIEWED:  Labs   and   Radiology   reports/films   reviewed         EXAM:   VITALS:      /78   Pulse 73   Temp 97.3 °F (36.3 °C) (Axillary)   Resp 16   Ht 5' 10\" (1.778 m)   Wt 125 lb (56.7 kg)   SpO2 94%   BMI 17.94 kg/m²     CONSTITUTIONAL:      awake,   alert,   cooperative,   no   apparent   distress,   and   appears   stated   age   No labored breathing, no stridor, garbled voice, on BiPAP  EYES:      Lids   and   lashes   normal,   pupils   equal,   round   and   reactive   to   light,   extra   ocular   muscles intact,   sclera   clear,   conjunctiva   Normal  HEAD:      Normocephalic,   without   obvious   abnormality,   atraumatic,   sinuses   nontender   on   palpation,   EARS:external   ears   without   lesions,   Moderate wax noted  NOSE: patent nasal airway, no bleeding noted, no lesions, Right DNS  ORAL: oral cavity &  pharynx   with   moist   mucus   membranes,   tonsils   without erythema   or   exudates,   no fluctuance, uvula midline, gums   normal   and   good   Dentition. The floor of mouth is soft without induration, the tongue base is soft as well. NECK:      Supple,   symmetrical,   trachea   midline,   no   adenopathy,   thyroid   symmetric,   not   enlarged and   no   tenderness,   skin   Normal  CHEST: equal expansion and symmetric, lungs CTA  CV: Heart RRR  MUSCULOSKELETAL:      There   is   no   redness,   warmth,   or   swelling   of   the   joints. NEUROLOGIC:      Awake,   alert,   Cooperative, very weak secondary to ALS, Sensory   is   Intact. dysarthria, severe weakness bilateral upper and lower extremity, no significant paresthesias, no movement disorder noted, cranial nerves II through VII grossly intact  PSYCH: Normal Mood and affect  SKIN: Normal turgor, no rash      Electronically   signed   by   Migel Good MD   on   6/26/2020   at   12:54 PM

## 2020-06-27 ENCOUNTER — APPOINTMENT (OUTPATIENT)
Dept: GENERAL RADIOLOGY | Age: 55
DRG: 004 | End: 2020-06-27
Attending: FAMILY MEDICINE
Payer: MEDICARE

## 2020-06-27 PROBLEM — E43 SEVERE MALNUTRITION (HCC): Chronic | Status: ACTIVE | Noted: 2020-06-27

## 2020-06-27 LAB
ANION GAP SERPL CALCULATED.3IONS-SCNC: 15 MMOL/L (ref 9–17)
BUN BLDV-MCNC: 14 MG/DL (ref 6–20)
BUN/CREAT BLD: 29 (ref 9–20)
CALCIUM SERPL-MCNC: 8.7 MG/DL (ref 8.6–10.4)
CHLORIDE BLD-SCNC: 100 MMOL/L (ref 98–107)
CO2: 22 MMOL/L (ref 20–31)
CREAT SERPL-MCNC: 0.49 MG/DL (ref 0.7–1.2)
FIO2: 30
GFR AFRICAN AMERICAN: >60 ML/MIN
GFR NON-AFRICAN AMERICAN: >60 ML/MIN
GFR SERPL CREATININE-BSD FRML MDRD: ABNORMAL ML/MIN/{1.73_M2}
GFR SERPL CREATININE-BSD FRML MDRD: ABNORMAL ML/MIN/{1.73_M2}
GLUCOSE BLD-MCNC: 91 MG/DL (ref 70–99)
HCT VFR BLD CALC: 39.3 % (ref 40.7–50.3)
HEMOGLOBIN: 12 G/DL (ref 13–17)
MCH RBC QN AUTO: 31.3 PG (ref 25.2–33.5)
MCHC RBC AUTO-ENTMCNC: 30.5 G/DL (ref 28.4–34.8)
MCV RBC AUTO: 102.3 FL (ref 82.6–102.9)
NEGATIVE BASE EXCESS, ART: ABNORMAL (ref 0–2)
NRBC AUTOMATED: 0 PER 100 WBC
O2 DEVICE/FLOW/%: ABNORMAL
PATIENT TEMP: 37
PDW BLD-RTO: 13.2 % (ref 11.8–14.4)
PLATELET # BLD: 107 K/UL (ref 138–453)
PMV BLD AUTO: 10.4 FL (ref 8.1–13.5)
POC HCO3: 27.7 MMOL/L (ref 22–27)
POC O2 SATURATION: 98 %
POC PCO2 TEMP: ABNORMAL MM HG
POC PCO2: 47 MM HG (ref 32–45)
POC PH TEMP: ABNORMAL
POC PH: 7.38 (ref 7.35–7.45)
POC PO2 TEMP: ABNORMAL MM HG
POC PO2: 100 MM HG (ref 75–95)
POSITIVE BASE EXCESS, ART: 3 (ref 0–2)
POTASSIUM SERPL-SCNC: 4.2 MMOL/L (ref 3.7–5.3)
RBC # BLD: 3.84 M/UL (ref 4.21–5.77)
SODIUM BLD-SCNC: 137 MMOL/L (ref 135–144)
TCO2 (CALC), ART: 29 MMOL/L (ref 23–28)
WBC # BLD: 9.3 K/UL (ref 3.5–11.3)

## 2020-06-27 PROCEDURE — 82803 BLOOD GASES ANY COMBINATION: CPT

## 2020-06-27 PROCEDURE — 6360000002 HC RX W HCPCS: Performed by: INTERNAL MEDICINE

## 2020-06-27 PROCEDURE — 2580000003 HC RX 258: Performed by: FAMILY MEDICINE

## 2020-06-27 PROCEDURE — 94003 VENT MGMT INPAT SUBQ DAY: CPT

## 2020-06-27 PROCEDURE — 85027 COMPLETE CBC AUTOMATED: CPT

## 2020-06-27 PROCEDURE — 2700000000 HC OXYGEN THERAPY PER DAY

## 2020-06-27 PROCEDURE — 2000000000 HC ICU R&B

## 2020-06-27 PROCEDURE — 80048 BASIC METABOLIC PNL TOTAL CA: CPT

## 2020-06-27 PROCEDURE — 71045 X-RAY EXAM CHEST 1 VIEW: CPT

## 2020-06-27 PROCEDURE — 36600 WITHDRAWAL OF ARTERIAL BLOOD: CPT

## 2020-06-27 PROCEDURE — 2580000003 HC RX 258: Performed by: INTERNAL MEDICINE

## 2020-06-27 PROCEDURE — 94770 HC ETCO2 MONITOR DAILY: CPT

## 2020-06-27 PROCEDURE — 94761 N-INVAS EAR/PLS OXIMETRY MLT: CPT

## 2020-06-27 PROCEDURE — 6360000002 HC RX W HCPCS: Performed by: FAMILY MEDICINE

## 2020-06-27 PROCEDURE — 36415 COLL VENOUS BLD VENIPUNCTURE: CPT

## 2020-06-27 RX ORDER — PROPOFOL 10 MG/ML
10 INJECTION, EMULSION INTRAVENOUS
Status: DISCONTINUED | OUTPATIENT
Start: 2020-06-27 | End: 2020-06-30

## 2020-06-27 RX ORDER — DEXTROSE AND SODIUM CHLORIDE 5; .45 G/100ML; G/100ML
INJECTION, SOLUTION INTRAVENOUS CONTINUOUS
Status: DISCONTINUED | OUTPATIENT
Start: 2020-06-27 | End: 2020-06-30

## 2020-06-27 RX ADMIN — FENTANYL CITRATE 50 MCG: 0.05 INJECTION, SOLUTION INTRAMUSCULAR; INTRAVENOUS at 10:19

## 2020-06-27 RX ADMIN — PROPOFOL 20 MCG/KG/MIN: 10 INJECTION, EMULSION INTRAVENOUS at 14:07

## 2020-06-27 RX ADMIN — SODIUM CHLORIDE: 9 INJECTION, SOLUTION INTRAVENOUS at 03:51

## 2020-06-27 RX ADMIN — FENTANYL CITRATE 50 MCG: 0.05 INJECTION, SOLUTION INTRAMUSCULAR; INTRAVENOUS at 14:04

## 2020-06-27 RX ADMIN — DEXTROSE AND SODIUM CHLORIDE: 5; 450 INJECTION, SOLUTION INTRAVENOUS at 15:32

## 2020-06-27 RX ADMIN — Medication 1 MG/HR: at 00:25

## 2020-06-27 RX ADMIN — PROPOFOL 50 MCG/KG/MIN: 10 INJECTION, EMULSION INTRAVENOUS at 03:51

## 2020-06-27 RX ADMIN — CEFAZOLIN 1 G: 1 INJECTION, POWDER, FOR SOLUTION INTRAMUSCULAR; INTRAVENOUS at 09:19

## 2020-06-27 RX ADMIN — FENTANYL CITRATE 50 MCG: 0.05 INJECTION, SOLUTION INTRAMUSCULAR; INTRAVENOUS at 15:36

## 2020-06-27 RX ADMIN — SODIUM CHLORIDE, PRESERVATIVE FREE 10 ML: 5 INJECTION INTRAVENOUS at 21:00

## 2020-06-27 RX ADMIN — CEFAZOLIN 1 G: 1 INJECTION, POWDER, FOR SOLUTION INTRAMUSCULAR; INTRAVENOUS at 00:37

## 2020-06-27 ASSESSMENT — PAIN SCALES - GENERAL: PAINLEVEL_OUTOF10: 8

## 2020-06-27 ASSESSMENT — PULMONARY FUNCTION TESTS
PIF_VALUE: 18
PIF_VALUE: 21
PIF_VALUE: 20
PIF_VALUE: 21
PIF_VALUE: 21
PIF_VALUE: 17
PIF_VALUE: 19

## 2020-06-27 ASSESSMENT — PAIN DESCRIPTION - PAIN TYPE: TYPE: ACUTE PAIN

## 2020-06-27 ASSESSMENT — PAIN DESCRIPTION - ONSET: ONSET: ON-GOING

## 2020-06-27 ASSESSMENT — PAIN DESCRIPTION - PROGRESSION: CLINICAL_PROGRESSION: NOT CHANGED

## 2020-06-27 ASSESSMENT — PAIN DESCRIPTION - FREQUENCY: FREQUENCY: CONTINUOUS

## 2020-06-27 ASSESSMENT — PAIN DESCRIPTION - LOCATION: LOCATION: BACK

## 2020-06-27 ASSESSMENT — PAIN - FUNCTIONAL ASSESSMENT: PAIN_FUNCTIONAL_ASSESSMENT: PREVENTS OR INTERFERES SOME ACTIVE ACTIVITIES AND ADLS

## 2020-06-27 NOTE — PROGRESS NOTES
Daily Total Kcal: 7233-5780 kcal (30-33 kcal/kg)  · Estimated Daily Protein (g): 70-87 g (1.2-1.5 g/kg)  · Estimated Daily Total Fluid (ml/day): 1 mL/kcal    Nutrition Diagnosis:   · Problem: Severe malnutrition, In context of chronic illness  · Etiology: related to Catabolic illness, Other (Comment)(hypermetabolism)     Signs and symptoms:  as evidenced by Weight loss greater than or equal to 10% in 6 months, Other (Comment), Moderate loss of subcutaneous fat(pt. mom stating pt. has been eating but keeps losing weight, MD report of decreased muscle)    Objective Information:  · Nutrition-Focused Physical Findings: GI: Soft, nontender, last BM noted 6/25; Edema: none; Skin: surgical (tracheostomy)  · Wound Type: Surgical Wound  · Current Nutrition Therapies:  · Oral Diet Orders: NPO   · Oral Diet intake: NPO  · Anthropometric Measures:  · Ht: 5' 10\" (177.8 cm)   · Current Body Wt: 127 lb 3.3 oz (57.7 kg)(bed)  · Admission Body Wt: 125 lb (56.7 kg)(stated)  · Usual Body Wt: 149 lb 11.1 oz (67.9 kg)(6 months ago)  · % Weight Change:  ,  11.2 kg (16%) weight loss in 6 months  · Ideal Body Wt: 166 lb (75.3 kg), % Ideal Body 77%  · BMI Classification: BMI <18.5 Underweight    Nutrition Interventions:   Continue NPO(advance diet when medically feasible)  Continued Inpatient Monitoring    Nutrition Evaluation:   · Evaluation: Goals set   · Goals: Diet advancement    · Monitoring: Nutrition Progression, Weight, Pertinent Labs, Monitor Bowel Function      Chantell Noel RDN, KE  RD Office Phone: [phone]

## 2020-06-27 NOTE — PROGRESS NOTES
completed shifts:  I/O last 3 completed shifts: In: 1482 [I.V.:1482]  Out: 350 [Urine:350]     No results for input(s): POCGLU in the last 72 hours. General appearance -sedated and in no acute distress  Mental status -on vent, opens eyes spontaneously, nods head to questions  Head - normocephalic and atraumatic  Eyes - pupils equal and reactive,  conjunctiva clear  Ears -external ears unremarkable  Nose - no drainage noted  Mouth - mucous membranes moist  Neck - supple, no carotid bruits, thyroid not palpable  Chest - clear to auscultation, normal effort  Heart - normal rate, regular rhythm, no murmur  Abdomen - soft,   Neurological -sedated on vent. Moves all limbs  Extremities - peripheral pulses palpable, no pedal edema   Skin - no gross lesions, rashes, or induration noted, tattoos        Medications: Allergies:    Allergies   Allergen Reactions    Sulfa Antibiotics Other (See Comments)     As child (unknown reaction)       Current Meds:   Current Facility-Administered Medications:     propofol 1,600 mg in 160 mL infusion, 10 mcg/kg/min, Intravenous, Titrated, Peace Wong MD, Last Rate: 5.2 mL/hr at 06/27/20 1439, 15 mcg/kg/min at 06/27/20 1439    dextrose 5 % and 0.45 % sodium chloride infusion, , Intravenous, Continuous, Moustapha Arguelles MD, Last Rate: 100 mL/hr at 06/27/20 1532    ipratropium-albuterol (DUONEB) nebulizer solution 1 ampule, 1 ampule, Inhalation, Q4H PRN, Zeynep Painter MD    fentaNYL (SUBLIMAZE) injection 50 mcg, 50 mcg, Intravenous, Q1H PRN, Moustapha Arguelles MD, 50 mcg at 06/27/20 1536    midazolam (VERSED) 1 mg/mL in D5W infusion, 1 mg/hr, Intravenous, Continuous, Moustapha Arguelles MD, Last Rate: 1 mL/hr at 06/27/20 0700, 1 mg/hr at 06/27/20 0700    sodium chloride flush 0.9 % injection 10 mL, 10 mL, Intravenous, 2 times per day, Zeynep Painter MD    sodium chloride flush 0.9 % injection 10 mL, 10 mL, Intravenous, PRN, Gt Melo MD    acetaminophen (TYLENOL) tablet 650 mg, 650 mg, Oral, Q6H PRN **OR** acetaminophen (TYLENOL) suppository 650 mg, 650 mg, Rectal, Q6H PRN, Zeynep Painter MD    polyethylene glycol (GLYCOLAX) packet 17 g, 17 g, Oral, Daily PRN, Zeynep Painter MD    promethazine (PHENERGAN) tablet 12.5 mg, 12.5 mg, Oral, Q6H PRN **OR** ondansetron (ZOFRAN) injection 4 mg, 4 mg, Intravenous, Q6H PRN, Zeynep Painter MD    famotidine (PEPCID) tablet 20 mg, 20 mg, Oral, Daily, Zeynep Painter MD    enoxaparin (LOVENOX) injection 40 mg, 40 mg, Subcutaneous, Daily, Zeynep Painter MD    albuterol (PROVENTIL) nebulizer solution 2.5 mg, 2.5 mg, Nebulization, Q2H PRN, Zeynep Painter MD    riluzole (RILUTEK) tablet 50 mg, 50 mg, Oral, Q12H, Zeynep Painter MD, 50 mg at 06/25/20 2211      I/O (24Hr): Intake/Output Summary (Last 24 hours) at 6/27/2020 1551  Last data filed at 6/27/2020 0547  Gross per 24 hour   Intake 1282 ml   Output 350 ml   Net 932 ml       Data:           Labs:    Hematology:  Recent Labs     06/26/20  0516 06/27/20  0424   WBC 6.5 9.3   RBC 4.06* 3.84*   HGB 12.6* 12.0*   HCT 39.6* 39.3*   MCV 97.5 102.3   MCH 31.0 31.3   MCHC 31.8 30.5   RDW 13.2 13.2   * 107*   MPV 10.3 10.4     Chemistry:  Recent Labs     06/26/20  0516 06/27/20  0424    137   K 4.1 4.2    100   CO2 32* 22   GLUCOSE 85 91   BUN 18 14   CREATININE 0.51* 0.49*   ANIONGAP 8* 15   LABGLOM >60 >60   GFRAA >60 >60   CALCIUM 9.1 8.7     No results for input(s): PROT, LABALBU, LABA1C, P4JJMVZ, L3RNJCR, FT4, TSH, AST, ALT, LDH, GGT, ALKPHOS, LABGGT, BILITOT, BILIDIR, AMMONIA, AMYLASE, LIPASE, LACTATE, CHOL, HDL, LDLCHOLESTEROL, CHOLHDLRATIO, TRIG, VLDL, QJX05MH, PHENYTOIN, PHENYF, URICACID, POCGLU in the last 72 hours.     Lab Results   Component Value Date/Time    SPECIAL 7ML RT [facility] 10/18/2019 04:47 AM     Lab Results   Component Value Date/Time    CULTURE NO GROWTH 6 DAYS 10/18/2019 04:47 AM       Lab Results   Component Value Date    POCPH 7.38 06/27/2020 POCPCO2 47 06/27/2020    POCPO2 100 06/27/2020    POCHCO3 27.7 06/27/2020    NBEA NOT REPORTED 06/27/2020    PBEA 3 06/27/2020    GPF1HXI 29 06/27/2020    TNQM1LFU 98 06/27/2020    FIO2 30.0 06/27/2020       Radiology:    Xr Chest Portable    Result Date: 6/26/2020  No acute cardiopulmonary disease. Tracheostomy in appropriate position. Xr Chest Portable    Result Date: 6/26/2020  Negative portable chest.         All radiological studies reviewed  Code Status:  Full Code        Electronically signed by Gt Melo MD on 6/27/2020 at 3:51 PM    This note was created with the assistance of a speech-recognition program.  Although the intention is to generate a document that actually reflects the content of the visit, no guarantees can be provided that every mistake has been identified and corrected by editing. Note was updated later by me after  physical examination and  completion of the assessment.

## 2020-06-27 NOTE — PROGRESS NOTES
Pulmonary Critical Care Progress Note    Patient seen for the follow up of respiratory failure  Subjective:    Patient became agitated after tracheostomy yesterday. I was contacted and added Versed and addition to propofol. He is still on propofol at 40 and Versed at 6 mg/h. He has now been on tube feeds. He has been on IV fluids at 75 an hour. Blood pressure stable. Mother reports he has been aspirating on liquids at home. Examination:    Vitals: /74   Pulse 63   Temp 99.1 °F (37.3 °C) (Axillary)   Resp 15   Ht 5' 10\" (1.778 m)   Wt 127 lb 4.8 oz (57.7 kg)   SpO2 98%   BMI 18.27 kg/m²   SpO2  Av.1 %  Min: 93 %  Max: 100 %  General appearance:sedated on the ventilator  Neck: No JVD  Lungs: Decreased breath sounds no crackles or wheezing  Heart: regular rate and rhythm, S1, S2 normal, no gallop  Abdomen: Soft, non tender, + BS  Extremities: no cyanosis or clubbing. No significant edema    LABs:    CBC:   Recent Labs     20  0516 20  0424   WBC 6.5 9.3   HGB 12.6* 12.0*   HCT 39.6* 39.3*   * 107*     BMP:   Recent Labs     20  0516 20  0424    137   K 4.1 4.2   CO2 32* 22   BUN 18 14   CREATININE 0.51* 0.49*   LABGLOM >60 >60   GLUCOSE 85 91       Radiology:  Chest x-ray   1. No acute cardiopulmonary disease. 2. Stable tracheostomy.    3. Nonspecific subcutaneous gas within the right lower neck soft tissues.              Impression:  · Acute on chronic hypercapnic respiratory failure  · ALS  · Dysphagia  · Remote smoking history      Recommendations:  · Assist-control ventilation  · CPAP trial as tolerated  · Wean off Versed and propofol  · Tracheostomy care  · ENT on consult  · Switch IV fluid to D5 half-normal saline  · Speech swallow evaluation  · Monitor urine output  · Discussed with mother  · Discussed with RN  · LTAC discharge planning  · DVT prophylaxis  ·   Tony Roberts MD, [facility]  Pulmonary Critical Care and Sleep Medicine,  [facility]  Cell: [phone]  Office: [phone]  cc35 min

## 2020-06-27 NOTE — PLAN OF CARE
Problem: Restraint Use - Nonviolent/Non-Self-Destructive Behavior:  Goal: Absence of restraint indications  Description: Absence of restraint indications  Outcome: Ongoing  Note: Bilateral soft wrist restraints maintained for protection of airway , lines and tubes. Passive ROM completed. Continually reassessed the need for restraints. Problem: Falls - Risk of: Intervention: Appropriate assistance to ensure safe transfer  Note: Continue fall precautions including arm band identification, falling star placed outside of the room and alarm at night and when unattended. Use of ambulatory aids when indicated and frequent visual checks. Patient is sedated on mechanical ventilation. Bed remains in lowest locked position. Fall precautions remain in place. Frequent nursing rounds.

## 2020-06-27 NOTE — PROGRESS NOTES
[facility]  Occupational Therapy Not Seen Note    Patient not available for Occupational Therapy due to:    [] Testing:    [] Hemodialysis    [] Cancelled by RN:    []Refusal by Patient:    [] Surgery:     [] Intubation:     [] Pain Medication:    [x] Sedation:     [] Spine Precautions :    [] Medical Instability:    [] Other:

## 2020-06-27 NOTE — PLAN OF CARE
Problem: Falls - Risk of:  Goal: Will remain free from falls  Description: Will remain free from falls  6/27/2020 0618 by Princess Juana RN  Outcome: Ongoing  6/26/2020 2023 by Sid Lorenzo RN  Outcome: Ongoing  Goal: Absence of physical injury  Description: Absence of physical injury  6/27/2020 0618 by Princess Juana RN  Outcome: Ongoing  6/26/2020 2023 by Sid Lorenzo RN  Outcome: Ongoing     Problem: Skin Integrity:  Goal: Will show no infection signs and symptoms  Description: Will show no infection signs and symptoms  Outcome: Ongoing  Goal: Absence of new skin breakdown  Description: Absence of new skin breakdown  Outcome: Ongoing     Problem: Breathing Pattern - Ineffective:  Goal: Ability to achieve and maintain a regular respiratory rate will improve  Description: Ability to achieve and maintain a regular respiratory rate will improve  Outcome: Ongoing     Problem: Restraint Use - Nonviolent/Non-Self-Destructive Behavior:  Goal: Absence of restraint indications  Description: Absence of restraint indications  6/27/2020 0618 by Princess Juana RN  Outcome: Ongoing  6/26/2020 2023 by Sid Lorenzo RN  Outcome: Ongoing  Note: Bilateral soft wrist restraints maintained for protection of airway , lines and tubes. Passive ROM completed. Continually reassessed the need for restraints.    Goal: Absence of restraint-related injury  Description: Absence of restraint-related injury  Outcome: Ongoing     Problem: Pain:  Goal: Pain level will decrease  Description: Pain level will decrease  Outcome: Ongoing  Goal: Control of acute pain  Description: Control of acute pain  Outcome: Ongoing  Goal: Control of chronic pain  Description: Control of chronic pain  Outcome: Ongoing

## 2020-06-27 NOTE — PROGRESS NOTES
Physical Therapy  DATE: 2020    NAME: Paul Mahan  MRN: [id number]   : 1965    Patient not seen this date for Physical Therapy due to:  [] Blood transfusion in progress  [x] Cancel by RN (Per Elba Rudolph RN,pt sedated,not appropriate for PT at this time)  [] Hemodialysis  []  Refusal by Patient   [] Spine Precautions   [] Strict Bedrest  [] Surgery  [] Testing      [] Other        [] PT being discontinued at this time. Patient independent. No further needs. [] PT being discontinued at this time as the patient has been transferred to hospice care. No further needs.     Vika Degree, PT  09:54

## 2020-06-27 NOTE — PROGRESS NOTES
ENT Progress Note    Rosario Morgan is a 53 y.o. male patient. No diagnosis found. Past Medical History:   Diagnosis Date    [address] ([address]) [address] [address]     Back pain     on 10/18/19 pt denies pain mgmnt    Degenerative disc disease      No past surgical history pertinent negatives on file. Allergies   Allergen Reactions    Sulfa Antibiotics Other (See Comments)     As child (unknown reaction)     Active Problems:    Severe malnutrition ([facility] [address]    Acute respiratory failure ([facility] [address]  Resolved Problems:    * No resolved hospital problems. *    Blood pressure 104/74, pulse 63, temperature 99.1 °F (37.3 °C), temperature source Axillary, resp. rate 15, height 5' 10\" (1.778 m), weight 127 lb 4.8 oz (57.7 kg), SpO2 98 %.     NECK-tracheostomy looks good, no bleeding, trach tube is secure, airway stable    PLAN-   -continue current management  -continue with routine trach care and suction  -OK to start Lovenox      Rm Crum MD  6/27/2020  12:03 PM

## 2020-06-27 NOTE — OP NOTE
[id number] [facility] [[email or web address]] [id number]                [address] [place] [address]                                OPERATIVE REPORT    PATIENT NAME: Lenin King                     :        1965  MED REC NO:   [id number]                             ROOM:       7743  ACCOUNT NO:   [de-identified]                           ADMIT DATE: 2020  PROVIDER:     Ute Nathan    DATE OF PROCEDURE:  2020    LOCATION:  [address] [place] [address] Ave:  1. Acute on chronic respiratory failure. 2.  Amyotrophic lateral sclerosis with progressive weakness. 3.  Dysarthria. POSTOPERATIVE DIAGNOSES:  1. Acute on chronic respiratory failure. 2.  Amyotrophic lateral sclerosis with progressive weakness. 3.  Dysarthria. PROCEDURE PERFORMED:  Tracheostomy. SURGEON:  Ute Nathan MD    ANESTHESIA:  General endotracheal.    ESTIMATED BLOOD LOSS:  Minimal.    OPERATIVE FINDINGS:  The patient had a normal trachea and the thyroid  isthmus had to be divided. INDICATIONS FOR PROCEDURE:  The patient is a 40-year-old male with a  history of ALS, who has progressive difficulty in breathing over the  last few weeks. He has been on BiPAP, which initially was only needed  at night, but more recently he has needed it day and night. He was  evaluated by Pulmonary and his primary doctor, who both agree that he  needs a tracheostomy. The risks, benefits, and alternatives of the  surgery were expressed in detail. Informed consent was then obtained to  proceed with tracheotomy. OPERATIVE PROCEDURE:  The patient was taken to the operating room and  placed supine on the operating room table. After adequate general  anesthesia was achieved, he was then prepped and draped in standard  surgical fashion. Attention was then brought to the anterior neck where  incision was made in the horizontal fashion below the cricoid cartilage.   Incision was infiltrated with 1% lidocaine with 1:100,000 epinephrine  before making the incision. Then, incision was made with a #15 scalpel,  taken down through the skin and subcutaneous tissues. The strap muscles  were identified and divided in the vertical midline and retracted  laterally revealing the thyroid isthmus whose superior and inferior  borders were dissected out and then it was divided and ligated. After  that was done, he had access to the anterior tracheal wall and the  incision was made between tracheal rings 2 and 3 and there was an  inferior flap door style incision. After that was done, the endotracheal  tube was then withdrawn and a #6 Shiley cuff tracheostomy tube was  placed into the new trachea stoma without difficulty. After that was  done, the anesthesia circuit was connected to the new trach tube. Carbon dioxide was confirmed in the patient's exhalation. I then  examined the wound and ensured hemostasis using careful bipolar cautery. Once hemostasis was achieved, I then secured the trach using four  interrupted 2-0 silk sutures followed by trach ties. Dressing was  placed as well. After the completion of the procedure, sponge and  needle counts were correct at the end of the procedure. The patient  tolerated the procedure well and was taken to the recovery room in  awake, alert, and stable condition. Above written and verbal  instructions were given with regard to postoperative care and followup.         Roderick Galvan    D: 06/26/2020 16:01:49       T: 06/26/2020 22:13:10     [email or web address]  Job#: [id number]     Doc#: [id number]    CC:

## 2020-06-27 NOTE — FLOWSHEET NOTE
Patient + parent was present. Patient is trached, unresponsive. Parent states about patient's medical condition, ALS. States he was given a few years, yet fading fast.  Shares worries, fears. States I already buried one of my sons . ..don't want to do it again. States holding onto hope. States treated well.  shared in listening, presence, prayers. Follow up as needed. 06/27/20 1307   Encounter Summary   Services provided to: Patient and family together   Referral/Consult From: [address] [place] [address] Parent;Spouse   Continue Visiting   (6-27-20 PT; trached.)   Complexity of Encounter Moderate   Length of Encounter 15 minutes   Spiritual Assessment Completed Yes   Spiritual/Adventist   Type Spiritual support   Assessment Passive; Anxious; Fearful   Intervention Active listening;Explored feelings, thoughts, concerns;Prayer;Sustaining presence/ Ministry of presence; Discussed illness/injury and it's impact; Discussed belief system/Lutheran practices/giovani   Outcome Expressed gratitude;Receptive;Engaged in conversation;Expressed feelings/needs/concerns

## 2020-06-28 ENCOUNTER — APPOINTMENT (OUTPATIENT)
Dept: GENERAL RADIOLOGY | Age: 55
DRG: 004 | End: 2020-06-28
Attending: FAMILY MEDICINE
Payer: MEDICARE

## 2020-06-28 LAB
ANION GAP SERPL CALCULATED.3IONS-SCNC: 10 MMOL/L (ref 9–17)
BUN BLDV-MCNC: 7 MG/DL (ref 6–20)
BUN/CREAT BLD: 15 (ref 9–20)
CALCIUM SERPL-MCNC: 8.8 MG/DL (ref 8.6–10.4)
CHLORIDE BLD-SCNC: 103 MMOL/L (ref 98–107)
CO2: 28 MMOL/L (ref 20–31)
CREAT SERPL-MCNC: 0.48 MG/DL (ref 0.7–1.2)
FIO2: 30
GFR AFRICAN AMERICAN: >60 ML/MIN
GFR NON-AFRICAN AMERICAN: >60 ML/MIN
GFR SERPL CREATININE-BSD FRML MDRD: ABNORMAL ML/MIN/{1.73_M2}
GFR SERPL CREATININE-BSD FRML MDRD: ABNORMAL ML/MIN/{1.73_M2}
GLUCOSE BLD-MCNC: 120 MG/DL (ref 70–99)
HCT VFR BLD CALC: 36.4 % (ref 40.7–50.3)
HEMOGLOBIN: 11.9 G/DL (ref 13–17)
MCH RBC QN AUTO: 31.6 PG (ref 25.2–33.5)
MCHC RBC AUTO-ENTMCNC: 32.7 G/DL (ref 28.4–34.8)
MCV RBC AUTO: 96.8 FL (ref 82.6–102.9)
NEGATIVE BASE EXCESS, ART: ABNORMAL (ref 0–2)
NRBC AUTOMATED: 0 PER 100 WBC
O2 DEVICE/FLOW/%: ABNORMAL
PATIENT TEMP: 37
PDW BLD-RTO: 13.2 % (ref 11.8–14.4)
PLATELET # BLD: 118 K/UL (ref 138–453)
PMV BLD AUTO: 11.4 FL (ref 8.1–13.5)
POC HCO3: 29.1 MMOL/L (ref 22–27)
POC O2 SATURATION: 99 %
POC PCO2 TEMP: ABNORMAL MM HG
POC PCO2: 42 MM HG (ref 32–45)
POC PH TEMP: ABNORMAL
POC PH: 7.45 (ref 7.35–7.45)
POC PO2 TEMP: ABNORMAL MM HG
POC PO2: 146 MM HG (ref 75–95)
POSITIVE BASE EXCESS, ART: 5 (ref 0–2)
POTASSIUM SERPL-SCNC: 3.8 MMOL/L (ref 3.7–5.3)
RBC # BLD: 3.76 M/UL (ref 4.21–5.77)
SODIUM BLD-SCNC: 141 MMOL/L (ref 135–144)
TCO2 (CALC), ART: 30 MMOL/L (ref 23–28)
WBC # BLD: 7.6 K/UL (ref 3.5–11.3)

## 2020-06-28 PROCEDURE — 82803 BLOOD GASES ANY COMBINATION: CPT

## 2020-06-28 PROCEDURE — 94003 VENT MGMT INPAT SUBQ DAY: CPT

## 2020-06-28 PROCEDURE — 6360000002 HC RX W HCPCS: Performed by: FAMILY MEDICINE

## 2020-06-28 PROCEDURE — 94770 HC ETCO2 MONITOR DAILY: CPT

## 2020-06-28 PROCEDURE — 85027 COMPLETE CBC AUTOMATED: CPT

## 2020-06-28 PROCEDURE — 36415 COLL VENOUS BLD VENIPUNCTURE: CPT

## 2020-06-28 PROCEDURE — 80048 BASIC METABOLIC PNL TOTAL CA: CPT

## 2020-06-28 PROCEDURE — 2580000003 HC RX 258: Performed by: INTERNAL MEDICINE

## 2020-06-28 PROCEDURE — 2000000000 HC ICU R&B

## 2020-06-28 PROCEDURE — 36600 WITHDRAWAL OF ARTERIAL BLOOD: CPT

## 2020-06-28 PROCEDURE — 2700000000 HC OXYGEN THERAPY PER DAY

## 2020-06-28 PROCEDURE — 6360000002 HC RX W HCPCS: Performed by: INTERNAL MEDICINE

## 2020-06-28 PROCEDURE — 94761 N-INVAS EAR/PLS OXIMETRY MLT: CPT

## 2020-06-28 PROCEDURE — 71045 X-RAY EXAM CHEST 1 VIEW: CPT

## 2020-06-28 PROCEDURE — 2580000003 HC RX 258: Performed by: FAMILY MEDICINE

## 2020-06-28 RX ORDER — LORAZEPAM 2 MG/ML
0.5 INJECTION INTRAMUSCULAR EVERY 4 HOURS PRN
Status: DISCONTINUED | OUTPATIENT
Start: 2020-06-28 | End: 2020-07-03 | Stop reason: HOSPADM

## 2020-06-28 RX ADMIN — ENOXAPARIN SODIUM 40 MG: 40 INJECTION SUBCUTANEOUS at 09:05

## 2020-06-28 RX ADMIN — DEXTROSE AND SODIUM CHLORIDE: 5; 450 INJECTION, SOLUTION INTRAVENOUS at 15:44

## 2020-06-28 RX ADMIN — FENTANYL CITRATE 50 MCG: 0.05 INJECTION, SOLUTION INTRAMUSCULAR; INTRAVENOUS at 09:04

## 2020-06-28 RX ADMIN — PROPOFOL 35 MCG/KG/MIN: 10 INJECTION, EMULSION INTRAVENOUS at 01:48

## 2020-06-28 RX ADMIN — FENTANYL CITRATE 50 MCG: 0.05 INJECTION, SOLUTION INTRAMUSCULAR; INTRAVENOUS at 12:07

## 2020-06-28 RX ADMIN — SODIUM CHLORIDE, PRESERVATIVE FREE 10 ML: 5 INJECTION INTRAVENOUS at 09:05

## 2020-06-28 ASSESSMENT — PULMONARY FUNCTION TESTS
PIF_VALUE: 19
PIF_VALUE: 19
PIF_VALUE: 18
PIF_VALUE: 18
PIF_VALUE: 20
PIF_VALUE: 18

## 2020-06-28 NOTE — PLAN OF CARE
Problem: Falls - Risk of:  Goal: Will remain free from falls  Description: Will remain free from falls  Outcome: Ongoing  Goal: Absence of physical injury  Description: Absence of physical injury  Outcome: Ongoing     Problem: Skin Integrity:  Goal: Will show no infection signs and symptoms  Description: Will show no infection signs and symptoms  Outcome: Ongoing  Goal: Absence of new skin breakdown  Description: Absence of new skin breakdown  Outcome: Ongoing     Problem: Breathing Pattern - Ineffective:  Goal: Ability to achieve and maintain a regular respiratory rate will improve  Description: Ability to achieve and maintain a regular respiratory rate will improve  Outcome: Ongoing     Problem: Restraint Use - Nonviolent/Non-Self-Destructive Behavior:  Goal: Absence of restraint indications  Description: Absence of restraint indications  Outcome: Ongoing  Goal: Absence of restraint-related injury  Description: Absence of restraint-related injury  Outcome: Ongoing     Problem: Nutrition  Goal: Optimal nutrition therapy  Outcome: Ongoing

## 2020-06-28 NOTE — PROGRESS NOTES
Pulmonary Critical Care Progress Note    Patient seen for the follow up of respiratory failure  Subjective:    Patient is more awake and follows commands off sedation. He tolerated CPAP. He has stable blood pressure and urine output. He reports weak cough by writing down on the paper. .    Examination:    Vitals: /84   Pulse 70   Temp 97.3 °F (36.3 °C) (Axillary)   Resp 19   Ht 5' 10\" (1.778 m)   Wt 128 lb 11.2 oz (58.4 kg)   SpO2 100%   BMI 18.47 kg/m²   SpO2  Av.4 %  Min: 96 %  Max: 100 %  General appearance: Awake alert following commands on the ventilator  Neck: No JVD  Lungs: Decreased breath sounds no crackles or wheezing  Heart: regular rate and rhythm, S1, S2 normal, no gallop  Abdomen: Soft, non tender, + BS  Extremities: no cyanosis or clubbing.  No significant edema    LABs:    CBC:   Recent Labs     20  0424 20   WBC 9.3 7.6   HGB 12.0* 11.9*   HCT 39.3* 36.4*   * 118*     BMP:   Recent Labs     20  0424 20    141   K 4.2 3.8   CO2 22 28   BUN 14 7   CREATININE 0.49* 0.48*   LABGLOM >60 >60   GLUCOSE 91 120*       Radiology:  Chest x-ray     No definite acute cardiopulmonary process.       Subcutaneous emphysema in the right supraclavicular region.             Impression:  · Acute on chronic hypercapnic respiratory failure  · ALS  · Dysphagia  · Remote smoking history      Recommendations:  · Assist-control ventilation  · Okay to wean off vent daytime as tolerated  · Keep on CPAP at night  · Sedation as needed  · Tracheostomy care  · Check with [employer] for adapter for trilogy ventilator  · ENT on consult  · Speech swallow evaluation  · Monitor urine output  · IV fluids  · Discussed with RT  · Physical therapy ambulate to chair  · DVT prophylaxis  ·   Chano Smith MD, [facility]  Pulmonary Critical Care and Sleep Medicine,  [facility]  Cell: [phone]  Office: [phone]

## 2020-06-28 NOTE — PLAN OF CARE
Problem: Restraint Use - Nonviolent/Non-Self-Destructive Behavior:  Goal: Absence of restraint indications  Description: Absence of restraint indications  6/28/2020 1420 by Christiano James RN  Outcome: Met This Shift  Patient off sedation. Following commands. Not reaching for tube. Will monitor off restraints. Problem: Falls - Risk of:  Goal: Will remain free from falls  Description: Will remain free from falls  6/28/2020 1420 by Christiano James RN  Outcome: Ongoing  Falling star program in place. Side rails up x2. Call light and personal belongings within reach. Continuing to maintain safe environment. Bed in lowest position and locked. Appropriate Identification armbands in place. Non-skid foot wear in place. Problem: Skin Integrity:  Goal: Will show no infection signs and symptoms  Description: Will show no infection signs and symptoms  6/28/2020 1420 by Christiano James RN  Outcome: Ongoing  Continuing to monitor for skin integrity risks. Turning/repositioning encouraged at least once every 2 hrs, and prn basis. Hygiene care being completed independently per patient; assistance provided when deemed necessary. Problem: Breathing Pattern - Ineffective:  Goal: Ability to achieve and maintain a regular respiratory rate will improve  Description: Ability to achieve and maintain a regular respiratory rate will improve  6/28/2020 1420 by Christiano James RN  Outcome: Ongoing  Patient tolerating cpap well. Off all sedation. Problem: Pain:  Goal: Pain level will decrease  Description: Pain level will decrease  6/28/2020 1420 by Christiano James RN  Outcome: Ongoing  Pain managed with PRN fentnyl. Request for change with MD rounds. Problem: Nutrition  Goal: Optimal nutrition therapy  6/28/2020 1420 by Christiano James RN  Outcome: Not Met This Shift   Patient scheduled for barium swallow study tomorrow.

## 2020-06-28 NOTE — CARE COORDINATION
Discharge Planning    Scripts for suction machine,trach supplies faxed to [facility] at [phone]    Need [employer] of medical necessity oxygen and ventilators completed and faxed to [facility]. This pre printed orders are on the front of pt's chart. Pt remains sedated.

## 2020-06-28 NOTE — PROGRESS NOTES
Writer spoke to Dr. Giovanny Lakhani at [phone] about NG tube placement for oral 50mg Rilvzole. Dr. Giovanny Lakhani wants to hold out until 6/28 during the dayshift, and is fine with the pt missing a couple of doses, because placing an NG tube tonight would \"decrease the quality of life\" for the patient. Nursing will consult dietary tomorrow per Dr. Giovanny Lakhani.

## 2020-06-28 NOTE — PROGRESS NOTES
[address] Hospitalist        6/28/2020   4:46 PM    Name:  Toyb Davenport  MRN:    [id number]     [place]:     [de-identified]   Room:  [facility] Day: 3     Admit Date: 6/25/2020  2:39 PM  PCP: Jacki Raza MD    C/C: No chief complaint on file. Assessment:      · Acute on chronic respiratory failure  · Hypoxia  · Amyotrophic lateral sclerosis  · Progressive weakness  · Dysarthria  · Osteoarthritis multiple joints  · Low back pain  · Nephrolithiasis  · Tracheostomy dated 6/26/2020  · Mechanical ventilation dated 6/26/2020 to 6/28/2020  · Aphonia         Plan:        · Admit to progressive-> ICU postprocedure  · Monitor vitals closely  · Keep SPO2 above 90%  · BiPAP  · RT eval  · O2 as needed  · DuoNeb every 4 hours as needed  · IV fluids-saline at 75 mL's per hour  · Diet dental soft -n.p.o.  · N.p.o. after midnight  · Hold trazodone  · Resume Rilutek  · Famotidine  · Pain control  · Antiemetics PRN  · Chest x-ray  · Sputum Gram stain  · Respiratory culture  · Check COVID-19-negative  · CBC, BMP  · Consult ENT  · Consult pulmonology  · Incentive spirometry  · Lovenox  · Consult PT/OT  · DVT and GI prophylaxis  · Presently on propofol and on mechanical ventilation  · Discussed with nursing  · Mechanical ventilation- DC 6/28  · Trilogy will review protocol w pt and wife tomorrow  · DC plan once OK w all      Subjective:     Patient seen and examined at bedside. No overnight events. No acute complaints today. Afebrile. In no apparent distress  Pt was given CPAP trial yesterday  Again today and then extubated  Pt c/o anxiety  Says can't cough and later can't breath through nose  SpO2 above 92%  Counseled about how to adjust  Education w suction  Presently no excess secretions  Supportive therapy provided    ROS:  Unable to obtain since patient sedated.         Physical Examination:      Vitals:  /84   Pulse 70   Temp 97.3 °F (36.3 °C) (Axillary)   Resp 19   Ht 5' 10\" (1.778 m)   Wt 128 lb 11.2 oz (58.4 kg)   SpO2 100%   BMI 18.47 kg/m²   Temp (24hrs), Av.8 °F (36.6 °C), Min:97.3 °F (36.3 °C), Max:98.6 °F (37 °C)    Weight:   Wt Readings from Last 3 Encounters:   20 128 lb 11.2 oz (58.4 kg)   20 130 lb (59 kg)   20 130 lb (59 kg)     I/O last 3 completed shifts:  I/O last 3 completed shifts: In: 3.4 [I.V.:3.4]  Out: 6040 [Urine:1350]     No results for input(s): POCGLU in the last 72 hours. General appearance -sedated and in no acute distress  Mental status -on vent, opens eyes spontaneously, nods head to questions  Head - normocephalic and atraumatic  Eyes - pupils equal and reactive,  conjunctiva clear  Ears -external ears unremarkable  Nose - no drainage noted  Mouth - mucous membranes moist  Neck - supple, no carotid bruits, thyroid not palpable  Chest - clear to auscultation, normal effort  Heart - normal rate, regular rhythm, no murmur  Abdomen - soft,   Neurological -sedated on vent. Moves all limbs  Extremities - peripheral pulses palpable, no pedal edema   Skin - no gross lesions, rashes, or induration noted, tattoos        Medications: Allergies:    Allergies   Allergen Reactions    Sulfa Antibiotics Other (See Comments)     As child (unknown reaction)       Current Meds:   Current Facility-Administered Medications:     propofol 1,600 mg in 160 mL infusion, 10 mcg/kg/min, Intravenous, Titrated, Xander De La Fuente MD, Stopped at 20 1235    dextrose 5 % and 0.45 % sodium chloride infusion, , Intravenous, Continuous, Eduardo Martinez MD, Last Rate: 75 mL/hr at 20 1544    ipratropium-albuterol (DUONEB) nebulizer solution 1 ampule, 1 ampule, Inhalation, Q4H PRN, Zeynep Painter MD    fentaNYL (SUBLIMAZE) injection 50 mcg, 50 mcg, Intravenous, Q1H PRN, Eduardo Martinez MD, 50 mcg at 20 1207    midazolam (VERSED) 1 mg/mL in D5W infusion, 1 mg/hr, Intravenous, Continuous, Eduardo Martinez MD, Stopped at 20 BILIDIR, AMMONIA, AMYLASE, LIPASE, LACTATE, CHOL, HDL, LDLCHOLESTEROL, CHOLHDLRATIO, TRIG, VLDL, XNK48OG, PHENYTOIN, PHENYF, URICACID, POCGLU in the last 72 hours. Lab Results   Component Value Date/Time    SPECIAL 7ML RT [facility] 10/18/2019 04:47 AM     Lab Results   Component Value Date/Time    CULTURE NO GROWTH 6 DAYS 10/18/2019 04:47 AM       Lab Results   Component Value Date    POCPH 7.45 06/28/2020    POCPCO2 42 06/28/2020    POCPO2 146 06/28/2020    POCHCO3 29.1 06/28/2020    NBEA NOT REPORTED 06/28/2020    PBEA 5 06/28/2020    IVE3FOA 30 06/28/2020    VUEW3GYH 99 06/28/2020    FIO2 30.0 06/28/2020       Radiology:    Xr Chest Portable    Result Date: 6/26/2020  No acute cardiopulmonary disease. Tracheostomy in appropriate position. Xr Chest Portable    Result Date: 6/26/2020  Negative portable chest.         All radiological studies reviewed  Code Status:  Full Code        Electronically signed by Joleen Vidal MD on 6/28/2020 at 4:46 PM    This note was created with the assistance of a speech-recognition program.  Although the intention is to generate a document that actually reflects the content of the visit, no guarantees can be provided that every mistake has been identified and corrected by editing. Note was updated later by me after  physical examination and  completion of the assessment.

## 2020-06-29 ENCOUNTER — APPOINTMENT (OUTPATIENT)
Dept: GENERAL RADIOLOGY | Age: 55
DRG: 004 | End: 2020-06-29
Attending: FAMILY MEDICINE
Payer: MEDICARE

## 2020-06-29 LAB
ANION GAP SERPL CALCULATED.3IONS-SCNC: 8 MMOL/L (ref 9–17)
BUN BLDV-MCNC: 4 MG/DL (ref 6–20)
BUN/CREAT BLD: 8 (ref 9–20)
CALCIUM SERPL-MCNC: 8.9 MG/DL (ref 8.6–10.4)
CHLORIDE BLD-SCNC: 103 MMOL/L (ref 98–107)
CO2: 31 MMOL/L (ref 20–31)
CREAT SERPL-MCNC: 0.49 MG/DL (ref 0.7–1.2)
GFR AFRICAN AMERICAN: >60 ML/MIN
GFR NON-AFRICAN AMERICAN: >60 ML/MIN
GFR SERPL CREATININE-BSD FRML MDRD: ABNORMAL ML/MIN/{1.73_M2}
GFR SERPL CREATININE-BSD FRML MDRD: ABNORMAL ML/MIN/{1.73_M2}
GLUCOSE BLD-MCNC: 111 MG/DL (ref 70–99)
HCT VFR BLD CALC: 36.8 % (ref 40.7–50.3)
HEMOGLOBIN: 11.9 G/DL (ref 13–17)
MCH RBC QN AUTO: 31.7 PG (ref 25.2–33.5)
MCHC RBC AUTO-ENTMCNC: 32.3 G/DL (ref 28.4–34.8)
MCV RBC AUTO: 98.1 FL (ref 82.6–102.9)
NRBC AUTOMATED: 0 PER 100 WBC
PDW BLD-RTO: 13.2 % (ref 11.8–14.4)
PLATELET # BLD: 126 K/UL (ref 138–453)
PMV BLD AUTO: 11.2 FL (ref 8.1–13.5)
POTASSIUM SERPL-SCNC: 4.2 MMOL/L (ref 3.7–5.3)
RBC # BLD: 3.75 M/UL (ref 4.21–5.77)
SODIUM BLD-SCNC: 142 MMOL/L (ref 135–144)
WBC # BLD: 8.1 K/UL (ref 3.5–11.3)

## 2020-06-29 PROCEDURE — 92611 MOTION FLUOROSCOPY/SWALLOW: CPT

## 2020-06-29 PROCEDURE — 2700000000 HC OXYGEN THERAPY PER DAY

## 2020-06-29 PROCEDURE — 97167 OT EVAL HIGH COMPLEX 60 MIN: CPT

## 2020-06-29 PROCEDURE — 6360000002 HC RX W HCPCS: Performed by: INTERNAL MEDICINE

## 2020-06-29 PROCEDURE — 74230 X-RAY XM SWLNG FUNCJ C+: CPT

## 2020-06-29 PROCEDURE — 94770 HC ETCO2 MONITOR DAILY: CPT

## 2020-06-29 PROCEDURE — 2580000003 HC RX 258: Performed by: FAMILY MEDICINE

## 2020-06-29 PROCEDURE — 94003 VENT MGMT INPAT SUBQ DAY: CPT

## 2020-06-29 PROCEDURE — 97530 THERAPEUTIC ACTIVITIES: CPT

## 2020-06-29 PROCEDURE — 36415 COLL VENOUS BLD VENIPUNCTURE: CPT

## 2020-06-29 PROCEDURE — 94761 N-INVAS EAR/PLS OXIMETRY MLT: CPT

## 2020-06-29 PROCEDURE — 97163 PT EVAL HIGH COMPLEX 45 MIN: CPT

## 2020-06-29 PROCEDURE — 2000000000 HC ICU R&B

## 2020-06-29 PROCEDURE — 80048 BASIC METABOLIC PNL TOTAL CA: CPT

## 2020-06-29 PROCEDURE — 85027 COMPLETE CBC AUTOMATED: CPT

## 2020-06-29 PROCEDURE — 6360000002 HC RX W HCPCS: Performed by: FAMILY MEDICINE

## 2020-06-29 PROCEDURE — 71045 X-RAY EXAM CHEST 1 VIEW: CPT

## 2020-06-29 PROCEDURE — 97535 SELF CARE MNGMENT TRAINING: CPT

## 2020-06-29 RX ADMIN — FENTANYL CITRATE 50 MCG: 0.05 INJECTION, SOLUTION INTRAMUSCULAR; INTRAVENOUS at 21:05

## 2020-06-29 RX ADMIN — SODIUM CHLORIDE, PRESERVATIVE FREE 10 ML: 5 INJECTION INTRAVENOUS at 21:07

## 2020-06-29 RX ADMIN — SODIUM CHLORIDE, PRESERVATIVE FREE 10 ML: 5 INJECTION INTRAVENOUS at 09:00

## 2020-06-29 RX ADMIN — ENOXAPARIN SODIUM 40 MG: 40 INJECTION SUBCUTANEOUS at 09:30

## 2020-06-29 ASSESSMENT — PULMONARY FUNCTION TESTS
PIF_VALUE: 18
PIF_VALUE: 19
PIF_VALUE: 22
PIF_VALUE: 14
PIF_VALUE: 19
PIF_VALUE: 16
PIF_VALUE: 14
PIF_VALUE: 18
PIF_VALUE: 19

## 2020-06-29 ASSESSMENT — PAIN DESCRIPTION - ORIENTATION: ORIENTATION: MID

## 2020-06-29 ASSESSMENT — PAIN DESCRIPTION - DESCRIPTORS: DESCRIPTORS: ACHING;DISCOMFORT;TENDER

## 2020-06-29 ASSESSMENT — PAIN DESCRIPTION - LOCATION: LOCATION: THROAT

## 2020-06-29 ASSESSMENT — PAIN SCALES - GENERAL
PAINLEVEL_OUTOF10: 0
PAINLEVEL_OUTOF10: 7

## 2020-06-29 ASSESSMENT — PAIN DESCRIPTION - PAIN TYPE: TYPE: SURGICAL PAIN

## 2020-06-29 ASSESSMENT — PAIN DESCRIPTION - FREQUENCY: FREQUENCY: CONTINUOUS

## 2020-06-29 NOTE — PROGRESS NOTES
5  Entrance Stairs - Rails: Left  Bathroom Shower/Tub: Tub/Shower unit  Bathroom Toilet: Standard  Bathroom Equipment: Grab bars around toilet, Grab bars in shower  Home Equipment: Cane, 4 wheeled walker, Oxygen(O2, continuous )  ADL Assistance: [address]: Independent(shares with wife)  Ambulation Assistance: Independent(cane)  Transfer Assistance: Independent  Active : Yes  Additional Comments: h.o. falls   Cognition        Objective     Observation/Palpation  Posture: Good  Observation: Pt has trach and vented    AROM RLE (degrees)  RLE AROM: WNL  AROM LLE (degrees)  LLE AROM : WNL  AROM RUE (degrees)  RUE General AROM: See OT eval for B UE ROM  Strength RLE  Strength RLE: WFL  Comment: B LE's 4/5,ankles 4+/5  Strength LLE  Strength LLE: WFL  Strength RUE  Comment: Seeb OT eval for B UE MMT  Tone RLE  RLE Tone: Normotonic  Tone LLE  LLE Tone: Normotonic  Motor Control  Gross Motor?: WFL(Slow but independent)     Bed mobility  Rolling to Left: Supervision  Rolling to Right: Supervision  Supine to Sit: Moderate assistance;2 Person assistance  Sit to Supine: Moderate assistance;2 Person assistance  Scooting: Moderate assistance;2 Person assistance  Transfers  Sit to Stand: Moderate Assistance;2 Person Assistance  Stand to sit: Moderate Assistance;2 Person Assistance  Stand Pivot Transfers: Moderate Assistance;2 Person Assistance  Ambulation  Ambulation?: Yes  Ambulation 1  Surface: level tile  Device: Rolling Walker  Assistance:  Moderate assistance;2 Person assistance  Gait Deviations: Shuffles;Decreased step height;Decreased step length  Distance: 3 L sidesteps at bed side  Comments: BTB     Balance  Posture: Good  Sitting - Static: Good  Sitting - Dynamic: Good  Standing - Static: Fair(w/ RW)  Standing - Dynamic: Poor;+(w/ RW)        Plan   Plan  Times per week: 1-2x/day,6-7 days/week  Current Treatment Recommendations: Strengthening, Transfer Training, Balance Training, Functional Mobility Training, Gait Training, Endurance Training, Stair training  Safety Devices  Type of devices: Gait belt, Left in bed, Bed alarm in place, Call light within reach, Nurse notified  Restraints  Initially in place: No    G-Code       OutComes Score                                                  AM-PAC Score             Goals  Short term goals  Time Frame for Short term goals: 12 treatments  Short term goal 1: Independent bed mobility/transfers  Short term goal 2: Independent amvbulation w/ ' x 1/ CGA ascending/descending 5 steps w/ 1 HR  Short term goal 3: Good standing balance  Short term goal 4: Tolerate 30 min ther act  Short term goal 5: 1/2 to 1 grade strength increase B LE's  Patient Goals   Patient goals : None stated       Therapy Time   Individual Concurrent Group Co-treatment   Time In 0956         Time Out 1031         Minutes 28              Co-treatment with OT warranted secondary to decreased safety and independence requiring 2 skilled therapy professionals to address individual discipline's goals. PT addressing {Blank multiple:49660::\"weight shifting prior to transfers\",\"transfer training\".       Maria Luisa Gordon, PT

## 2020-06-29 NOTE — PLAN OF CARE
Nutrition Problem: Severe malnutrition, In context of chronic illness  Intervention: Food and/or Nutrient Delivery: Continue current diet, Start ONS  Nutritional Goals: Diet advancement

## 2020-06-29 NOTE — PROGRESS NOTES
ENT Progress Note    Darnell Gong is a 53 y.o. male patient. No diagnosis found. Past Medical History:   Diagnosis Date    Amyotrophic lateral sclerosis (ALS) ([facility] [address]     Back pain     on 10/18/19 pt denies pain mgmnt    Degenerative disc disease        Active Problems:    Severe malnutrition (HCC)    Acute respiratory failure (HCC)  Resolved Problems:    * No resolved hospital problems. *    Blood pressure (!) 134/97, pulse 86, temperature 98.4 °F (36.9 °C), temperature source Oral, resp. rate 21, height 5' 10\" (1.778 m), weight 128 lb 11.2 oz (58.4 kg), SpO2 99 %.     NECK-tracheostomy looks good, no bleeding, trach tube is secure, airway stable    PLAN-   -continue current management  -continue with routine trach care and suction\  -PLEASE REMOVE TRACH TUBE SUTURES ON POST-OP DAY 6 OR 7 (OR CALL US TO REMOVE)    Maurilio Merino MD  6/29/2020  4:37 PM

## 2020-06-29 NOTE — PROGRESS NOTES
Occupational Therapy   Occupational Therapy Initial Assessment  Date: 2020   Patient Name: Sejal Christensen  MRN: [id number]     : 1965    Date of Service: 2020    Discharge Recommendations:  [address], Continue to assess pending progress     RN reports patient is medically stable for therapy treatment this date. Chart reviewed prior to treatment and patient is agreeable for therapy. All lines intact and patient positioned comfortably at end of treatment. All patient needs addressed prior to ending therapy session. Assessment   Performance deficits / Impairments: Decreased functional mobility ; Decreased ADL status; Decreased strength;Decreased safe awareness;Decreased balance;Decreased endurance;Decreased posture  Prognosis: Fair;Good  Decision Making: High Complexity  OT Education: OT Role;Energy Conservation;Home Exercise Program;Precautions; ADL Adaptive Strategies;Transfer Training;Equipment; Family Education;Plan of Care  REQUIRES OT FOLLOW UP: Yes  Activity Tolerance  Activity Tolerance: Patient Tolerated treatment well;Patient limited by fatigue  Activity Tolerance: first time up since admission  Safety Devices  Safety Devices in place: Yes  Type of devices: Call light within reach;Nurse notified;Gait belt;Patient at risk for falls; Left in bed           Patient Diagnosis(es): There were no encounter diagnoses. has a past medical history of Amyotrophic lateral sclerosis (ALS) ([facility] [address] Back pain, and Degenerative disc disease. has a past surgical history that includes Appendectomy; Cystocopy (10/05/2016); Lithotripsy (Right, 10/19/2016); Colonoscopy (); lumbar fusion (2018); pr lumbar spine fusn,post intrbdy (N/A, 2018); and tracheostomy (N/A, 2020).            Restrictions  Restrictions/Precautions  Restrictions/Precautions: General Precautions, Fall Risk  Required Braces or Orthoses?: No    Subjective   General  Chart Reviewed: Yes  Patient assessed for rehabilitation services?: Yes  Family / Caregiver Present: No  Patient Currently in Pain: Denies  Vital Signs  Patient Currently in Pain: Denies  Social/Functional History  Social/Functional History  Lives With: Spouse  Type of Home: House  Home Layout: One level  Home Access: Stairs to enter with rails  Entrance Stairs - Number of Steps: 5  Entrance Stairs - Rails: Left  Bathroom Shower/Tub: Tub/Shower unit  Bathroom Toilet: Standard  Bathroom Equipment: Grab bars around toilet, Grab bars in [address], 4 wheeled walker, Oxygen(O2, continuous )  ADL Assistance: [address]: Independent(shares with wife)  Ambulation Assistance: Independent(cane)  Transfer Assistance: Independent  Active : Yes  Additional Comments: h.o. falls        Objective   Vision: Within Functional Limits  Hearing: Within functional limits    Orientation  Overall Orientation Status: Within Normal Limits  Observation/Palpation  Posture: Good  Observation: Pt has trach and vented  Balance  Sitting Balance: Stand by assistance  Standing Balance: Moderate assistance(x2 for brief stand at bedside/ to take ~2-3 small side steps along bedside. Pt unsteady and fatigues easily)  Functional Mobility  Assist Level: Moderate assistance(x2 for ~2-3 small steps along bedside. Limited by dec. activity tolerance, dec balance, and vent tubing limits distance as well. )  ADL  Feeding: NPO  Grooming: Moderate assistance;Minimal assistance  UE Bathing: Moderate assistance;Maximum assistance  LE Bathing: Maximum assistance  UE Dressing:  Moderate assistance;Maximum assistance  LE Dressing: Maximum assistance  Toileting: Maximum assistance  Tone RUE  RUE Tone: Normotonic  Tone LUE  LUE Tone: Normotonic  Coordination  Movements Are Fluid And Coordinated: Yes     Bed mobility  Rolling to Left: Supervision  Rolling to Right: Supervision  Supine to Sit: Moderate assistance;2 Person assistance  Sit to Supine: Moderate assistance;2 Person assistance  Scooting: Moderate assistance;2 Person assistance        Cognition  Overall Cognitive Status: WNL  Perception  Overall Perceptual Status: WFL     Sensation  Overall Sensation Status: WFL        LUE AROM (degrees)  LUE AROM : WFL  RUE AROM (degrees)  RUE AROM : WFL  LUE Strength  Gross LUE Strength: WFL  RUE Strength  Gross RUE Strength: WFL                   Plan   Plan  Times per week: 4-5x/week, 1-2x/day  Current Treatment Recommendations: Strengthening, Balance Training, Functional Mobility Training, Endurance Training, Equipment Evaluation, Education, & procurement, Patient/Caregiver Education & Training, Self-Care / ADL, Safety Education & Training, Positioning            Goals  Short term goals  Time Frame for Short term goals: by discharge, pt will  Short term goal 1: demo min A/CGA with ADL transfers with good safety and approp AD/DME  Short term goal 2: demo CGA with toileting routine with good safety/approp DME  Short term goal 3: demo min A with UB/LB ADLs at approp level with good safety  Short term goal 4: demo SBA with bed mob with rails/controls as needed  Short term goal 5: demo and verb good understanding of fall prevention techs, EC/WS techs, equip needs, and B UE HEP   Patient Goals   Patient goals : to go home       Therapy Time   Individual Concurrent Group Co-treatment   Time In 0956         Time Out 1031         Minutes 28               Co-treatment with PT warranted secondary to decreased safety and independence requiring 2 skilled therapy professionals to address individual discipline's goals. OT addressing preparation for ADL transfer, sitting balance for increased ADL performance, sitting/activity tolerance, functional reaching, environmental safety/scanning, fall prevention, functional mobility for ADL transfers, ability to sequence and follow directions and functional UE strength.   Jonny Dow, OT

## 2020-06-29 NOTE — PLAN OF CARE
Problem: Skin Integrity:  Goal: Will show no infection signs and symptoms  Description: Will show no infection signs and symptoms  6/29/2020 0825 by Hesham Cooper RN  Outcome: Ongoing  Note: Skin care assessment performed and charted. Assessed for areas of redness and or breakdown. Osvaldo scale order set in place. Instructed on the importance of position changes every 2 hours and PRN for prevention and maintenance of skin integrity. Patient presented to the hospital with an abrasion to the bridge of nose. No new areas of breakdown noted. Assessed oral mucosa. Monitored for redness, thrush, oral lesions and dentition. Patient currently NPO. Patient to have a video swallow today. 6/28/2020 2022 by Chato Martinez RN  Outcome: Ongoing     Problem: Pain:  Goal: Control of acute pain  Description: Control of acute pain  6/28/2020 2022 by Chato Martinez RN  Outcome: Ongoing     Problem: Pain:  Goal: Control of acute pain  Description: Control of acute pain  6/29/2020 0825 by Hesham Cooper RN  Outcome: Ongoing  Note: Comfort level assessed at frequent intervals. Medicated PRN. Encouraged the use of both pharmacological and non-pharmacological methods of pain control. Instructed to call with any c/o discomfort. Monitored the effectiveness of all interventions utilized. 6/28/2020 2022 by Chato Martinez RN  Outcome: Ongoing     Problem: Falls - Risk of: Intervention: Appropriate assistance to ensure safe transfer  Note: Continue fall precautions including arm band identification, falling star placed outside of the room and alarm at night and when unattended. Use of ambulatory aids when indicated and frequent visual checks. Monitored orientation status. Call light within reach at all times. Bed remains in lowest locked position. Instructed to call with any needs for assistance prior to any attempt to get OOB. Frequent nursing rounds. Fall precautions remain in place.       Problem: Breathing Pattern - Ineffective: Intervention: Stress management techniques  Note: Respiratory assessment performed and charted. Monitored respiratory rate, rhythm and pattern. Monitored for any increase in respiratory distress and WOB. Patient has a tracheostomy. Tracheostomy care completed per hospital policy. Instructed to call with any c/o SOB.

## 2020-06-29 NOTE — CARE COORDINATION
Social work: phone call from RN to arrange sleep study. SW contact [facility] sleep lab ([address] [phone]) to scheduled. SW informed I need order for study with type(baseline, etc) and face to face note stating reason for sleep study. Once this information is obtained, SW will need to call back to schedule. Await call back from RN to get information.

## 2020-06-29 NOTE — PROCEDURES
INSTRUMENTAL SWALLOW REPORT  MODIFIED BARIUM SWALLOW    NAME: Giovanny Desir   : 1965  MRN: [id number]       Date of Eval: 2020              Referring Diagnosis(es):      Past Medical History:  has a past medical history of Amyotrophic lateral sclerosis (ALS) ([address] Back pain, and Degenerative disc disease. Past Surgical History:  has a past surgical history that includes Appendectomy; Cystocopy (10/05/2016); Lithotripsy (Right, 10/19/2016); Colonoscopy (2017); lumbar fusion (2018); pr lumbar spine fusn,post intrbdy (N/A, 2018); and tracheostomy (N/A, 2020). Current Diet Solid Consistency: NPO  Current Diet Liquid Consistency: NPO       Type of Study: Initial MBS      Patient Complaints/Reason for Referral:  Giovanny Desir was referred for a MBS to assess the efficiency of his/her swallow function, assess for aspiration, and to make recommendations regarding safe dietary consistencies, effective compensatory strategies, and safe eating environment. Onset of problem: Patient presents with difficulty in breathing progressively over the last 2-3 weeks. Patient has history of ALS and has been seeing his pulmonologist.  Patient was placed on BiPAP which was initially needed at night. Over the last 2 weeks or so the patient has needed BiPAP during the day also. Patient says he has had cough which has been congested in addition. He says since yesterday he felt significant worsening. Does not have any fever headache or facial pressure. Denies any diarrhea or abdominal pain. And has had episodes of orthopnea and paroxysmal nocturnal dyspnea within the several days.     Patient called his pulmonologist who adjusted his settings initially which have not been effective. We were called by Milly Sorenson CNP today with concerns of rapid deterioration of the patient because of his chest wall muscle weakness.   He has not been able to sustain oxygenation and the only option Cup    Medication administration: Meds in puree    Safe Swallow Protocol:     Compensatory Swallowing Strategies: Alternate solids and liquids;Small bites/sips;Eat/Feed slowly;Upright as possible for all oral intake    Recommendations/Treatment  Requires SLP Intervention: Yes        D/C Recommendations: 24 hour supervision/assistance  Postural Changes and/or Swallow Maneuvers: Upright 90 degrees      Recommended Exercises:    Therapeutic Interventions: Diet tolerance monitoring         Education: Images and recommendations were reviewed with pt following this exam.   Patient Education: yes  Patient Education Response: Verbalizes understanding    Prognosis  Prognosis for safe diet advancement: good  Duration/Frequency of Treatment  Duration/Frequency of Treatment: 2-3X      Goals:    Long Term:     To Maximize safety with intake, optimize nutrition/hydration and minimize risk for aspiration. Short Term:  Goals: The patient will tolerate recommended diet without observed clinical signs of aspiration      Oral Preparation / Oral Phase  Oral Phase: WFL  Oral Phase: Slow yet functional mastication and oral manipulation, mild decreased bolus formation with spillover BOT to vallec prior to swallow trigger      Pharyngeal Phase  Pharyngeal Phase: WFL  Pharyngeal: Thin cup: + trace flash penetration with no aspiration and mild vallec stasis. Nectar thick tsp and cup: No penetration and no aspiration with mild vallec stasis.   Puree/Fruit/Cracker: No penetration and no aspiration with mild vallec stasis    Dysphagia Outcome Severity Scale: Level 6: Within functional limits/Modified independence  Penetration-Aspiration Scale (PAS): 2 - Material enters the airway, remains above the vocal folds, and is ejected from the airway(with thin liquid)        Esophageal Phase  Esophageal Screen: [facility]        Pain   Patient Currently in Pain: No  Pain Level: 0  Pain Type: Acute pain  Pain Location: Back      Therapy Time:   Individual Concurrent Group Co-treatment   Time In 1400         Time Out 1420         Minutes 20                   Pamela Guido, 6/29/2020, 2:23 PM

## 2020-06-29 NOTE — PLAN OF CARE
Problem: Falls - Risk of:  Goal: Will remain free from falls  Description: Will remain free from falls  6/28/2020 2022 by Ceci Higgins RN  Outcome: Ongoing  6/28/2020 1420 by Robert Banda RN  Outcome: Ongoing  6/28/2020 0701 by Jeff Huff RN  Outcome: Ongoing  Goal: Absence of physical injury  Description: Absence of physical injury  6/28/2020 2022 by Ceci Higgins RN  Outcome: Ongoing  6/28/2020 0701 by Jeff Huff RN  Outcome: Ongoing     Problem: Skin Integrity:  Goal: Will show no infection signs and symptoms  Description: Will show no infection signs and symptoms  6/28/2020 2022 by Ceci Higgins RN  Outcome: Ongoing  6/28/2020 1420 by Robert Banda RN  Outcome: Ongoing  6/28/2020 0701 by Jeff Huff RN  Outcome: Ongoing  Goal: Absence of new skin breakdown  Description: Absence of new skin breakdown  6/28/2020 2022 by Ceci Higgins RN  Outcome: Ongoing  6/28/2020 0701 by Jeff Huff RN  Outcome: Ongoing     Problem: Breathing Pattern - Ineffective:  Goal: Ability to achieve and maintain a regular respiratory rate will improve  Description: Ability to achieve and maintain a regular respiratory rate will improve  6/28/2020 2022 by Ceci Higgins RN  Outcome: Ongoing  6/28/2020 1420 by Robert Banda RN  Outcome: Ongoing  6/28/2020 0701 by Jeff Huff RN  Outcome: Ongoing     Problem: Pain:  Goal: Pain level will decrease  Description: Pain level will decrease  6/28/2020 2022 by Ceci Higgins RN  Outcome: Ongoing  6/28/2020 1420 by Robert Banda RN  Outcome: Ongoing  6/28/2020 0701 by Jeff Huff RN  Outcome: Ongoing  Goal: Control of acute pain  Description: Control of acute pain  6/28/2020 2022 by Ceci Higgins RN  Outcome: Ongoing  6/28/2020 0701 by Jeff Huff RN  Outcome: Ongoing  Goal: Control of chronic pain  Description: Control of chronic pain  6/28/2020 2022 by Ceci Higgins RN  Outcome: Ongoing  6/28/2020 0701 by Hair Blair RN  Outcome: Ongoing     Problem: Nutrition  Goal: Optimal nutrition therapy  6/28/2020 2022 by Koko Thompson RN  Outcome: Ongoing  6/28/2020 1420 by Linda Byers RN  Outcome: Not Met This Shift  6/28/2020 0701 by Hair Blair RN  Outcome: Ongoing

## 2020-06-30 ENCOUNTER — APPOINTMENT (OUTPATIENT)
Dept: GENERAL RADIOLOGY | Age: 55
DRG: 004 | End: 2020-06-30
Attending: FAMILY MEDICINE
Payer: MEDICARE

## 2020-06-30 LAB
ANION GAP SERPL CALCULATED.3IONS-SCNC: 7 MMOL/L (ref 9–17)
BUN BLDV-MCNC: 5 MG/DL (ref 6–20)
BUN/CREAT BLD: 12 (ref 9–20)
CALCIUM SERPL-MCNC: 8.6 MG/DL (ref 8.6–10.4)
CHLORIDE BLD-SCNC: 100 MMOL/L (ref 98–107)
CO2: 32 MMOL/L (ref 20–31)
CREAT SERPL-MCNC: 0.43 MG/DL (ref 0.7–1.2)
FIO2: 21
GFR AFRICAN AMERICAN: >60 ML/MIN
GFR NON-AFRICAN AMERICAN: >60 ML/MIN
GFR SERPL CREATININE-BSD FRML MDRD: ABNORMAL ML/MIN/{1.73_M2}
GFR SERPL CREATININE-BSD FRML MDRD: ABNORMAL ML/MIN/{1.73_M2}
GLUCOSE BLD-MCNC: 119 MG/DL (ref 70–99)
HCT VFR BLD CALC: 33.6 % (ref 40.7–50.3)
HEMOGLOBIN: 10.9 G/DL (ref 13–17)
MCH RBC QN AUTO: 31.7 PG (ref 25.2–33.5)
MCHC RBC AUTO-ENTMCNC: 32.4 G/DL (ref 28.4–34.8)
MCV RBC AUTO: 97.7 FL (ref 82.6–102.9)
NEGATIVE BASE EXCESS, ART: ABNORMAL (ref 0–2)
NRBC AUTOMATED: 0 PER 100 WBC
O2 DEVICE/FLOW/%: ABNORMAL
PATIENT TEMP: 37
PDW BLD-RTO: 13.1 % (ref 11.8–14.4)
PLATELET # BLD: 119 K/UL (ref 138–453)
PMV BLD AUTO: 10.1 FL (ref 8.1–13.5)
POC HCO3: 29.2 MMOL/L (ref 22–27)
POC O2 SATURATION: 96 %
POC PCO2 TEMP: ABNORMAL MM HG
POC PCO2: 37 MM HG (ref 32–45)
POC PH TEMP: ABNORMAL
POC PH: 7.51 (ref 7.35–7.45)
POC PO2 TEMP: ABNORMAL MM HG
POC PO2: 73 MM HG (ref 75–95)
POSITIVE BASE EXCESS, ART: 6 (ref 0–2)
POTASSIUM SERPL-SCNC: 3.7 MMOL/L (ref 3.7–5.3)
RBC # BLD: 3.44 M/UL (ref 4.21–5.77)
SODIUM BLD-SCNC: 139 MMOL/L (ref 135–144)
TCO2 (CALC), ART: 30 MMOL/L (ref 23–28)
WBC # BLD: 8.1 K/UL (ref 3.5–11.3)

## 2020-06-30 PROCEDURE — 85027 COMPLETE CBC AUTOMATED: CPT

## 2020-06-30 PROCEDURE — 6360000002 HC RX W HCPCS: Performed by: INTERNAL MEDICINE

## 2020-06-30 PROCEDURE — 2000000000 HC ICU R&B

## 2020-06-30 PROCEDURE — 36600 WITHDRAWAL OF ARTERIAL BLOOD: CPT

## 2020-06-30 PROCEDURE — 6360000002 HC RX W HCPCS: Performed by: FAMILY MEDICINE

## 2020-06-30 PROCEDURE — 80048 BASIC METABOLIC PNL TOTAL CA: CPT

## 2020-06-30 PROCEDURE — 6370000000 HC RX 637 (ALT 250 FOR IP): Performed by: FAMILY MEDICINE

## 2020-06-30 PROCEDURE — 97116 GAIT TRAINING THERAPY: CPT

## 2020-06-30 PROCEDURE — 71045 X-RAY EXAM CHEST 1 VIEW: CPT

## 2020-06-30 PROCEDURE — 94770 HC ETCO2 MONITOR DAILY: CPT

## 2020-06-30 PROCEDURE — 2700000000 HC OXYGEN THERAPY PER DAY

## 2020-06-30 PROCEDURE — 97530 THERAPEUTIC ACTIVITIES: CPT

## 2020-06-30 PROCEDURE — 94003 VENT MGMT INPAT SUBQ DAY: CPT

## 2020-06-30 PROCEDURE — 2580000003 HC RX 258: Performed by: FAMILY MEDICINE

## 2020-06-30 PROCEDURE — 82803 BLOOD GASES ANY COMBINATION: CPT

## 2020-06-30 PROCEDURE — 87040 BLOOD CULTURE FOR BACTERIA: CPT

## 2020-06-30 PROCEDURE — 97535 SELF CARE MNGMENT TRAINING: CPT

## 2020-06-30 PROCEDURE — 94761 N-INVAS EAR/PLS OXIMETRY MLT: CPT

## 2020-06-30 PROCEDURE — 2580000003 HC RX 258: Performed by: INTERNAL MEDICINE

## 2020-06-30 PROCEDURE — 36415 COLL VENOUS BLD VENIPUNCTURE: CPT

## 2020-06-30 RX ADMIN — RILUZOLE 50 MG: 50 TABLET, FILM COATED ORAL at 08:17

## 2020-06-30 RX ADMIN — SODIUM CHLORIDE, PRESERVATIVE FREE 10 ML: 5 INJECTION INTRAVENOUS at 20:23

## 2020-06-30 RX ADMIN — ACETAMINOPHEN 650 MG: 325 TABLET ORAL at 22:45

## 2020-06-30 RX ADMIN — FAMOTIDINE 20 MG: 20 TABLET ORAL at 08:17

## 2020-06-30 RX ADMIN — DEXTROSE AND SODIUM CHLORIDE: 5; 450 INJECTION, SOLUTION INTRAVENOUS at 08:18

## 2020-06-30 RX ADMIN — SODIUM CHLORIDE, PRESERVATIVE FREE 10 ML: 5 INJECTION INTRAVENOUS at 08:17

## 2020-06-30 RX ADMIN — RILUZOLE 50 MG: 50 TABLET, FILM COATED ORAL at 20:15

## 2020-06-30 RX ADMIN — ENOXAPARIN SODIUM 40 MG: 40 INJECTION SUBCUTANEOUS at 08:17

## 2020-06-30 RX ADMIN — PIPERACILLIN SODIUM AND TAZOBACTAM SODIUM 4.5 G: 4; .5 INJECTION, POWDER, LYOPHILIZED, FOR SOLUTION INTRAVENOUS at 20:15

## 2020-06-30 ASSESSMENT — PAIN SCALES - GENERAL
PAINLEVEL_OUTOF10: 0

## 2020-06-30 ASSESSMENT — PULMONARY FUNCTION TESTS
PIF_VALUE: 21
PIF_VALUE: 23.5
PIF_VALUE: 22
PIF_VALUE: 14
PIF_VALUE: 21

## 2020-06-30 NOTE — CARE COORDINATION
Discharge Planning    Met with patient and his wife Yuly Robles to discuss therapy's recommendation for SNF. Pt is able to write and say that he wants to go home and wife states she has been taking care of him and will continue to do so but will also appreciate help from home care. Referral has been made to [facility]. Phone call to Kel Barth at [facility] at [phone]. She states wife has been trained in the Trilogy and suctioning trach and that she did well. Patient can use his current Trilogy and equipment does not need to be replaced. [employer] is willing to come and adjust the settings if needed just contact them by phone. Waiting on Dr Gamaliel Salmon to fill on the ventilator and oxygen order and then will need to fax to [employer] at fax [phone]    Pt is using Ensure Enlive and referral to Araceli at [facility] and she will review dietary consult and Medicaid may cover the Ensure Enlive and if so will deliver to pt's home. Wife and pt informed of referral and gave them contact information.

## 2020-06-30 NOTE — PROGRESS NOTES
Pulmonary Critical Care Progress Note  Cortney Chan MD     Patient seen for the follow up of acute on chronic hypercarbic respiratory failure, ALS,   Subjective:  He is sitting up in the bed, complaining of mild discomfort at the trach site. He denies any chest pain. He is on the vent support. He tolerated a few hours of trach collar yesterday. Examination:  Vitals: BP (!) 143/92   Pulse 88   Temp 99.2 °F (37.3 °C) (Oral)   Resp 24   Ht 5' 10\" (1.778 m)   Wt 128 lb 11.2 oz (58.4 kg)   SpO2 94%   BMI 18.47 kg/m²   General appearance:  alert and cooperative with exam  Neck: No JVD  Lungs: Moderate air exchange no wheezing  Heart: regular rate and rhythm, S1, S2 normal, no gallop  Abdomen: Soft, non tender, + BS  Extremities: no cyanosis or clubbing. No significant edema    LABs:  CBC:   Recent Labs     06/29/20  0432 06/30/20  0507   WBC 8.1 8.1   HGB 11.9* 10.9*   HCT 36.8* 33.6*   * 119*     BMP:   Recent Labs     06/29/20  0432 06/30/20  0507    139   K 4.2 3.7   CO2 31 32*   BUN 4* 5*   CREATININE 0.49* 0.43*   LABGLOM >60 >60   GLUCOSE 111* 119*       Lab Results   Component Value Date    POCPH 7.45 06/28/2020    POCPCO2 42 06/28/2020    POCPO2 146 06/28/2020    POCHCO3 29.1 06/28/2020    NBEA NOT REPORTED 06/28/2020    PBEA 5 06/28/2020    SFZ6UZC 30 06/28/2020    MOGV6EQZ 99 06/28/2020    FIO2 30.0 06/28/2020     Radiology:  6/30/2020           Impression:  · Acute on chronic hypercapnic respiratory failure  · ALS  · Remote history of smoking  · Aspiration risk    Recommendations:  · Continue vent support, increased trach collar trials. · Arrange for home ventilator/trilogy, and family education. Discussed with RT about goal vent settings on home trilogy. Once those are arranged for them patient is connected we like to get a blood gas 2 to 3 hours after that to ensure they are working okay for the patient before discharging.   · Discontinue IV fluids  · Continue Rilutek  · Albuterol and Ipratropium Q 4 hours prn  · DVT prophylaxis with low molecular weight heparin  · Discussed with patient and his wife. · Discussed with RN  · OK for discharge planning from pulmonary stand point with follow up with pulmonary in 1-2 weeks. Plan was discussed with patient and his wife and they understands it.     Doroteo Santamaria MD, [facility]  Pulmonary Critical Care and Sleep Medicine,  [facility]  Cell: [phone]  Office: [phone]

## 2020-06-30 NOTE — CARE COORDINATION
Discharge Planning    Gladys Thompson from [facility] here and has adjusted settings on Trilogy. She has taken the oxygen and ventilator orders signed by Dr Brandon Paige. She will use ABG report for oxygen necessity. Wife has suction machine in her possession. Gladys Thompson from [facility] will deliver 2nd back up trilogy invasive ventilator on day of discharge to pt;'s home and if pt qualifies for portable oxygen tank she will also deliver that then. Since pt will be having home care [facility] will have to supply some of the trach supplies due to [facility] regulations. Humecate Thompson from [facility] has provided Spreadshirt and faxed this sheet to Margaret Castillo along with operative report for size of Shiley 6 cuffed. [facility] and confirmed that she received the trach supply list.  Requested afternoon visit on day of discharge due to pt's needs and Shaye Neff has made a note of that. [facility] faxed over order for Ensure Enlive and placed in front of pt's chart and sticky note to Dr Terrance Herman to sign. Fax signed order back to [facility].    ABG's to be done 7-1 and need to fax results to Gladys Thompson at [facility] [phone]. Resp therapist updated and states she will follow thru on this. PLAN  Still needed is ABG results to be  done on 7-1 need to be faxed to Gladys Thompson at [employer] [phone]. order for Ensure [facility] signed  and faxed  to [address] [place] [address] to send home several days of trach supplies and dressings. [employer] will deliver back up trilogy to pt's home 7-1 and possibly portable o2 tanks depending on ABG results.     [facility] has accepted pt and did request same day visit on day of discharge if pt goes home in am.

## 2020-06-30 NOTE — PROGRESS NOTES
[address] Hospitalist        2020   5:27 PM    Name:  Марина Flores  MRN:    [id number]     Shylaberirajide:     [de-identified]   Room:  [address] [place] [address] Day: 5     Admit Date: 2020  2:39 PM  PCP: Esthela Hermosillo MD    C/C: No chief complaint on file. Assessment:      Acute on chronic hypoxemic respiratory failure  Amyotrophic lateral sclerosis  Progressive weakness  Dysarthria/aphonia  Osteoarthritis of multiple joint  Low back pain  Nephrolithiasis  Tracheostomy dated 2020  Mechanical ventilation dated 2022  Adjustment disorder        Plan:        · Patient is admitted to medical ICU  · Ostomy has been more than 90%  · Telemetry  · Check vital signs closely  · CBC BMP daily  · BiPAP as needed  · Bronchodilators DuoNeb  · Soft diet  · IV fluid hydration  · Critical care/pulmonology following  · Continue Rilutek  · Lovenox for DVT prophylaxis  · Discharge planning for tomorrow hopefully  · DVT and GI prophylaxis        Subjective:     Patient seen and examined at bedside. No overnight events. No acute complaints today. Afebrile  Pt. Denies any CP, SOB, palpitation, HA, dizziness, chills, cough, cold, changes in urination, BM or skin changes or any pain. ROS:  A 10 point system reviewed and negative otherwise mentioned above. Physical Examination:      Vitals:  /85   Pulse 95   Temp 99 °F (37.2 °C) (Oral)   Resp 18   Ht 5' 10\" (1.778 m)   Wt 128 lb 11.2 oz (58.4 kg)   SpO2 92%   BMI 18.47 kg/m²   Temp (24hrs), Av.9 °F (37.2 °C), Min:98.5 °F (36.9 °C), Max:99.2 °F (37.3 °C)    Weight:   Wt Readings from Last 3 Encounters:   20 128 lb 11.2 oz (58.4 kg)   20 130 lb (59 kg)   20 130 lb (59 kg)     I/O last 3 completed shifts:  I/O last 3 completed shifts: In: 2966 [P.O.:660; I.V.:1815]  Out: 5203 [Urine:1375]     No results for input(s): POCGLU in the last 72 hours.       General appearance - alert, well appearing, and in no acute distress  Mental status - oriented to person, place, and time with normal affect  Head - normocephalic and atraumatic  Eyes - pupils equal and reactive, extraocular eye movements intact, conjunctiva clear  Ears - hearing appears to be intact  Nose - no drainage noted  Mouth - mucous membranes moist  Neck - supple, no carotid bruits, thyroid not palpable, tracheostomy in place  Chest - clear to auscultation, normal effort  Heart - normal rate, regular rhythm, no murmur  Abdomen - soft, nontender, nondistended, bowel sounds present all four quadrants, no masses, hepatomegaly or splenomegaly  Neurological -unable to assess t  Extremities - peripheral pulses palpable, no pedal edema or calf pain with palpation  Skin - no gross lesions, rashes, or induration noted        Medications: Allergies:    Allergies   Allergen Reactions    Sulfa Antibiotics Other (See Comments)     As child (unknown reaction)       Current Meds:   Current Facility-Administered Medications:     LORazepam (ATIVAN) injection 0.5 mg, 0.5 mg, Intravenous, Q4H PRN, Josué Huerta MD    dextrose 5 % and 0.45 % sodium chloride infusion, , Intravenous, Continuous, Josué Huerta MD, Last Rate: 75 mL/hr at 06/30/20 0818    ipratropium-albuterol (DUONEB) nebulizer solution 1 ampule, 1 ampule, Inhalation, Q4H PRN, Liseth Mo MD    fentaNYL (SUBLIMAZE) injection 50 mcg, 50 mcg, Intravenous, Q1H PRN, Josué Huerta MD, 50 mcg at 06/29/20 2105    sodium chloride flush 0.9 % injection 10 mL, 10 mL, Intravenous, 2 times per day, Liseth Mo MD, 10 mL at 06/30/20 0817    sodium chloride flush 0.9 % injection 10 mL, 10 mL, Intravenous, PRN, Zeynep Painter MD    acetaminophen (TYLENOL) tablet 650 mg, 650 mg, Oral, Q6H PRN **OR** acetaminophen (TYLENOL) suppository 650 mg, 650 mg, Rectal, Q6H PRN, Zeynep Painter MD    polyethylene glycol (GLYCOLAX) packet 17 g, 17 g, Oral, Daily PRN, Zeynep Painter MD    promethazine (PHENERGAN) tablet 12.5 mg, 12.5 mg, Oral, Q6H PRN **OR** ondansetron (ZOFRAN) injection 4 mg, 4 mg, Intravenous, Q6H PRN, Zeynep Painter MD    famotidine (PEPCID) tablet 20 mg, 20 mg, Oral, Daily, Zeynep Painter MD, 20 mg at 06/30/20 0817    enoxaparin (LOVENOX) injection 40 mg, 40 mg, Subcutaneous, Daily, Zeynep Painter MD, 40 mg at 06/30/20 0817    albuterol (PROVENTIL) nebulizer solution 2.5 mg, 2.5 mg, Nebulization, Q2H PRN, Zeynep Painter MD    riluzole (RILUTEK) tablet 50 mg, 50 mg, Oral, Q12H, Zeynep Painter MD, 50 mg at 06/30/20 0817      I/O (24Hr): Intake/Output Summary (Last 24 hours) at 6/30/2020 1727  Last data filed at 6/30/2020 1300  Gross per 24 hour   Intake 2475 ml   Output 425 ml   Net 2050 ml       Data:           Labs:    Hematology:  Recent Labs     06/28/20 0427 06/29/20  0432 06/30/20  0507   WBC 7.6 8.1 8.1   RBC 3.76* 3.75* 3.44*   HGB 11.9* 11.9* 10.9*   HCT 36.4* 36.8* 33.6*   MCV 96.8 98.1 97.7   MCH 31.6 31.7 31.7   MCHC 32.7 32.3 32.4   RDW 13.2 13.2 13.1   * 126* 119*   MPV 11.4 11.2 10.1     Chemistry:  Recent Labs     06/28/20  0427 06/29/20  0432 06/30/20  0507    142 139   K 3.8 4.2 3.7    103 100   CO2 28 31 32*   GLUCOSE 120* 111* 119*   BUN 7 4* 5*   CREATININE 0.48* 0.49* 0.43*   ANIONGAP 10 8* 7*   LABGLOM >60 >60 >60   GFRAA >60 >60 >60   CALCIUM 8.8 8.9 8.6     No results for input(s): PROT, LABALBU, LABA1C, K7NWRHV, B5PYYFF, FT4, TSH, AST, ALT, LDH, GGT, ALKPHOS, LABGGT, BILITOT, BILIDIR, AMMONIA, AMYLASE, LIPASE, LACTATE, CHOL, HDL, LDLCHOLESTEROL, CHOLHDLRATIO, TRIG, VLDL, KQV06GY, PHENYTOIN, PHENYF, URICACID, POCGLU in the last 72 hours.     Lab Results   Component Value Date/Time    SPECIAL 7ML RT [facility] 10/18/2019 04:47 AM     Lab Results   Component Value Date/Time    CULTURE NO GROWTH 6 DAYS 10/18/2019 04:47 AM       Lab Results   Component Value Date    POCPH 7.51 06/30/2020    POCPCO2 37 06/30/2020    POCPO2 73 06/30/2020    POCHCO3 29.2 06/30/2020

## 2020-06-30 NOTE — PROGRESS NOTES
Physical Therapy  Facility/Department: [facility] ICU  Daily Treatment Note  NAME: Robert Mota  : 1965  MRN: [id number]    Date of Service: 2020    Discharge Recommendations:  Subacute/Skilled Nursing Facility, Continue to assess pending progress        Assessment   Body structures, Functions, Activity limitations: Decreased functional mobility ; Decreased strength;Decreased endurance;Decreased balance  Prognosis: Good  Decision Making: High Complexity  REQUIRES PT FOLLOW UP: Yes  Activity Tolerance  Activity Tolerance: Patient limited by endurance     Patient Diagnosis(es): There were no encounter diagnoses. has a past medical history of Amyotrophic lateral sclerosis (ALS) ([facility] [address] Back pain, and Degenerative disc disease. has a past surgical history that includes Appendectomy; Cystocopy (10/05/2016); Lithotripsy (Right, 10/19/2016); Colonoscopy (); lumbar fusion (2018); pr lumbar spine fusn,post intrbdy (N/A, 2018); and tracheostomy (N/A, 2020).     Restrictions  Restrictions/Precautions  Restrictions/Precautions: General Precautions, Fall Risk  Required Braces or Orthoses?: No  Subjective   General  Chart Reviewed: Yes  Family / Caregiver Present: No  General Comment  Comments: OK for PT per RN  Pain Screening  Patient Currently in Pain: No  Vital Signs  Patient Currently in Pain: No       Orientation     Cognition      Objective   Bed mobility  Rolling: Minimal assistance  Supine to sit: Minimal assistance; 2 Person assistance  Sit to supine: Minimal assistance; 2 Person assistance  Scooting: Minimal assistance  Transfers  Sit to Stand: Minimal Assistance;2 Person Assistance  Stand to sit: Minimal Assistance;2 Person Assistance  Ambulation  Ambulation?: Yes  Ambulation 1  Surface: level tile  Device: Rolling Walker  Assistance: Minimal assistance;2 Person assistance  Gait Deviations: Slow Clare;Decreased step length;Decreased step height  Distance: 10 lateral side steps at EOB

## 2020-06-30 NOTE — PROGRESS NOTES
[address] Hospitalist        6/29/2020   11:00 PM    Name:  Марина Flores  MRN:    [id number]     [place]:     [de-identified]   Room:  [address] [place] [address] Day: 4     Admit Date: 6/25/2020  2:39 PM  PCP: Esthela Hermosillo MD    C/C: No chief complaint on file. 'Breathing issues worsening'      Assessment:      · Acute on chronic respiratory failure  · Hypoxia  · Amyotrophic lateral sclerosis  · Progressive weakness  · Dysarthria  · Osteoarthritis multiple joints  · Low back pain  · Nephrolithiasis  · Tracheostomy dated 6/26/2020  · Mechanical ventilation dated 6/26/2020 to 6/28/2020  · Aphonia   · Adjustment disorder         Plan:        · Admit to progressive-> ICU postprocedure  · Monitor vitals closely  · Keep SPO2 above 90%  · BiPAP  · RT eval  · O2 as needed  · DuoNeb every 4 hours as needed  · IV fluids-saline at 75 mL's per hour  · Diet dental soft -n.p.o.  · N.p.o. after midnight  · Hold trazodone  · Resume Rilutek  · Famotidine  · Pain control  · Antiemetics PRN  · Chest x-ray  · Sputum Gram stain  · Respiratory culture  · Check COVID-19-negative  · CBC, BMP  · Consult ENT  · Consult pulmonology  · Incentive spirometry  · Lovenox  · Consult PT/OT  · DVT and GI prophylaxis  · Presently on propofol and on mechanical ventilation  · Discussed with nursing  · Mechanical ventilation- DC 6/28  · Trilogy will review protocol w pt and wife tomorrow  · DC plan once OK w all  · Supportive therapy provided  · Remeron      Subjective:     Patient seen and examined at bedside. No overnight events. No acute complaints today. Afebrile. In no apparent distress  Depressed  Having issues with adjusting to the new lifestyle with tracheostomy    ROS:  Unable to obtain since patient sedated. History of Present Illness:      Марина Flores is a 53 y.o.  male who presents with No chief complaint on file.     Patient presents with difficulty in breathing progressively over the last 2-3 weeks.   Patient 06/28/20 128 lb 11.2 oz (58.4 kg)   06/07/20 130 lb (59 kg)   06/05/20 130 lb (59 kg)     I/O last 3 completed shifts:  I/O last 3 completed shifts: In: 2673 [I.V.:2673]  Out: 9117 [Urine:1650]     No results for input(s): POCGLU in the last 72 hours. General appearance -sedated and in no acute distress  Mental status -on vent, opens eyes spontaneously, nods head to questions  Head - normocephalic and atraumatic  Eyes - pupils equal and reactive,  conjunctiva clear  Ears -external ears unremarkable  Nose - no drainage noted  Mouth - mucous membranes moist  Neck - supple, no carotid bruits, tracheostomy   Chest - clear to auscultation, normal effort  Heart - normal rate, regular rhythm, no murmur  Abdomen - soft,   Neurological -sedated on vent. Moves all limbs  Extremities - peripheral pulses palpable, no pedal edema   Skin - no gross lesions, rashes, or induration noted, tattoos        Medications: Allergies:    Allergies   Allergen Reactions    Sulfa Antibiotics Other (See Comments)     As child (unknown reaction)       Current Meds:   Current Facility-Administered Medications:     LORazepam (ATIVAN) injection 0.5 mg, 0.5 mg, Intravenous, Q4H PRN, Nikki Mijares MD    propofol 1,600 mg in 160 mL infusion, 10 mcg/kg/min, Intravenous, Titrated, Go Rankin MD, Stopped at 06/28/20 1235    dextrose 5 % and 0.45 % sodium chloride infusion, , Intravenous, Continuous, Nikki Mijares MD, Last Rate: 75 mL/hr at 06/28/20 1544    ipratropium-albuterol (DUONEB) nebulizer solution 1 ampule, 1 ampule, Inhalation, Q4H PRN, Zeynep Painter MD    fentaNYL (SUBLIMAZE) injection 50 mcg, 50 mcg, Intravenous, Q1H PRN, Nikki Mijares MD, 50 mcg at 06/29/20 2105    midazolam (VERSED) 1 mg/mL in D5W infusion, 1 mg/hr, Intravenous, Continuous, Nikki Mijares MD, Stopped at 06/28/20 4908    sodium chloride flush 0.9 % injection 10 mL, 10 mL, Intravenous, 2 times per day, Lalitha Brady MD, 10 mL at Sabi Chance in the last 72 hours. Lab Results   Component Value Date/Time    [facility] 10/18/2019 04:47 AM     Lab Results   Component Value Date/Time    CULTURE NO GROWTH 6 DAYS 10/18/2019 04:47 AM       Lab Results   Component Value Date    POCPH 7.45 06/28/2020    POCPCO2 42 06/28/2020    POCPO2 146 06/28/2020    POCHCO3 29.1 06/28/2020    NBEA NOT REPORTED 06/28/2020    PBEA 5 06/28/2020    MJM3UDY 30 06/28/2020    KZND7LQS 99 06/28/2020    FIO2 30.0 06/28/2020       Radiology:    Xr Chest Portable    Result Date: 6/26/2020  No acute cardiopulmonary disease. Tracheostomy in appropriate position. Xr Chest Portable    Result Date: 6/26/2020  Negative portable chest.         All radiological studies reviewed  Code Status:  Full Code        Electronically signed by Latasha Jefferson MD on 6/29/2020 at 11:00 PM    This note was created with the assistance of a speech-recognition program.  Although the intention is to generate a document that actually reflects the content of the visit, no guarantees can be provided that every mistake has been identified and corrected by editing. Note was updated later by me after  physical examination and  completion of the assessment.

## 2020-06-30 NOTE — PLAN OF CARE
Problem: Falls - Risk of:  Goal: Will remain free from falls  Description: Will remain free from falls  Outcome: Ongoing   Pt remains free from falls and in fall precautions at this time. Bed is in lowest position with all wheels locked and 3/4 side rails up. Call light, bedside table, and personal belongings within reach of pt. Nurse educated on proper use of call light. Pt acknowledged teaching. Pt wearing nonskid socks when out of bed and has steady gait. Nurse will continue to assess and monitor pt with hourly rounds and offer toileting. Problem: Falls - Risk of:  Goal: Absence of physical injury  Description: Absence of physical injury  Outcome: Ongoing   Pt remains free from any physical injuries at this time. Will continue to provide a safe environment for pt. Will continue to assess and monitor pt with hourly rounds. Problem: Breathing Pattern - Ineffective:  Goal: Ability to achieve and maintain a regular respiratory rate will improve  Description: Ability to achieve and maintain a regular respiratory rate will improve  Outcome: Ongoing   Patients respiratory status stable at this time. No signs or symptoms of distress noted. Respirations non-labored and regular. 02 via trach vent in place as needed to maintain sp02>90% or per md order. Continuous SpO2 monitoring in place. Problem: Pain:  Goal: Control of acute pain  Description: Control of acute pain  Outcome: Ongoing   Pt medicated with pain medication prn. Assessed all pain characteristics including level, type, location, frequency, and onset. Non-pharmacologic interventions offered to pt as well. Pt states pain is tolerable at this time. Will continue to monitor.

## 2020-06-30 NOTE — PROGRESS NOTES
~1-2 minutes with RW and assist x2 to safety to take lateral steps along bed side   Bed mobility  Supine to Sit: Minimal assistance;2 Person assistance  Sit to Supine: Minimal assistance;2 Person assistance  Scooting: Minimal assistance;2 Person assistance  Transfers  Sit to stand: Minimal assistance;2 Person assistance  Stand to sit: Minimal assistance;2 Person assistance                       Cognition  Overall Cognitive Status: WNL  Cognition Comment: communication board for patient to answer/ask questions                                         Plan   Plan  Times per week: 4-5x/week, 1-2x/day  Current Treatment Recommendations: Strengthening, Balance Training, Functional Mobility Training, Endurance Training, Equipment Evaluation, Education, & procurement, Patient/Caregiver Education & Training, Self-Care / ADL, Safety Education & Training, Positioning  AM-PAC Score        AM-PAC Inpatient Daily Activity Raw Score: 12 (06/30/20 1521)  AM-PAC Inpatient ADL T-Scale Score : 30.6 (06/30/20 1521)  ADL Inpatient CMS 0-100% Score: 66.57 (06/30/20 1521)  ADL Inpatient CMS G-Code Modifier : CL (06/30/20 1521)    Goals  Short term goals  Time Frame for Short term goals: by discharge, pt will  Short term goal 1: demo min A/CGA with ADL transfers with good safety and approp AD/DME  Short term goal 2: demo CGA with toileting routine with good safety/approp DME  Short term goal 3: demo min A with UB/LB ADLs at approp level with good safety  Short term goal 4: demo SBA with bed mob with rails/controls as needed  Short term goal 5: demo and verb good understanding of fall prevention techs, EC/WS techs, equip needs, and B UE HEP   Patient Goals   Patient goals : to go home       Therapy Time   Individual Concurrent Group Co-treatment   Time In       1410   Time Out       1433   Minutes       23        Co-treatment with PT warranted secondary to decreased safety and independence requiring 2 skilled therapy professionals to address individual discipline's goals. OT addressing preparation for ADL transfer, sitting balance for increased ADL performance, sitting/activity tolerance, functional reaching, environmental safety/scanning, fall prevention, functional mobility for ADL transfers and functional UE strength. Upon writer exit, call light within reach, pt retired to bed. All lines intact and patient positioned comfortably. All patient needs addressed prior to ending therapy session. Chart reviewed prior to treatment and patient is agreeable for therapy. RN reports patient is medically stable for therapy treatment this date.     DOMINICK Bonilla/RENE

## 2020-07-01 ENCOUNTER — APPOINTMENT (OUTPATIENT)
Dept: GENERAL RADIOLOGY | Age: 55
DRG: 004 | End: 2020-07-01
Attending: FAMILY MEDICINE
Payer: MEDICARE

## 2020-07-01 LAB
ANION GAP SERPL CALCULATED.3IONS-SCNC: 13 MMOL/L (ref 9–17)
BUN BLDV-MCNC: 9 MG/DL (ref 6–20)
BUN/CREAT BLD: 16 (ref 9–20)
CALCIUM SERPL-MCNC: 9.1 MG/DL (ref 8.6–10.4)
CHLORIDE BLD-SCNC: 100 MMOL/L (ref 98–107)
CO2: 25 MMOL/L (ref 20–31)
CREAT SERPL-MCNC: 0.55 MG/DL (ref 0.7–1.2)
FIO2: 21
GFR AFRICAN AMERICAN: >60 ML/MIN
GFR NON-AFRICAN AMERICAN: >60 ML/MIN
GFR SERPL CREATININE-BSD FRML MDRD: ABNORMAL ML/MIN/{1.73_M2}
GFR SERPL CREATININE-BSD FRML MDRD: ABNORMAL ML/MIN/{1.73_M2}
GLUCOSE BLD-MCNC: 102 MG/DL (ref 70–99)
HCT VFR BLD CALC: 35.5 % (ref 40.7–50.3)
HEMOGLOBIN: 11.8 G/DL (ref 13–17)
MCH RBC QN AUTO: 32 PG (ref 25.2–33.5)
MCHC RBC AUTO-ENTMCNC: 33.2 G/DL (ref 28.4–34.8)
MCV RBC AUTO: 96.2 FL (ref 82.6–102.9)
NEGATIVE BASE EXCESS, ART: ABNORMAL (ref 0–2)
NRBC AUTOMATED: 0 PER 100 WBC
O2 DEVICE/FLOW/%: ABNORMAL
PATIENT TEMP: 38.4
PDW BLD-RTO: 12.8 % (ref 11.8–14.4)
PLATELET # BLD: 129 K/UL (ref 138–453)
PMV BLD AUTO: 10.3 FL (ref 8.1–13.5)
POC HCO3: 23.4 MMOL/L (ref 22–27)
POC O2 SATURATION: 97 %
POC PCO2 TEMP: ABNORMAL MM HG
POC PCO2: 27 MM HG (ref 32–45)
POC PH TEMP: ABNORMAL
POC PH: 7.55 (ref 7.35–7.45)
POC PO2 TEMP: ABNORMAL MM HG
POC PO2: 77 MM HG (ref 75–95)
POSITIVE BASE EXCESS, ART: 1 (ref 0–2)
POTASSIUM SERPL-SCNC: 3.6 MMOL/L (ref 3.7–5.3)
RBC # BLD: 3.69 M/UL (ref 4.21–5.77)
SODIUM BLD-SCNC: 138 MMOL/L (ref 135–144)
TCO2 (CALC), ART: 24 MMOL/L (ref 23–28)
WBC # BLD: 9 K/UL (ref 3.5–11.3)

## 2020-07-01 PROCEDURE — 82803 BLOOD GASES ANY COMBINATION: CPT

## 2020-07-01 PROCEDURE — 6370000000 HC RX 637 (ALT 250 FOR IP): Performed by: FAMILY MEDICINE

## 2020-07-01 PROCEDURE — 99221 1ST HOSP IP/OBS SF/LOW 40: CPT | Performed by: INTERNAL MEDICINE

## 2020-07-01 PROCEDURE — 94761 N-INVAS EAR/PLS OXIMETRY MLT: CPT

## 2020-07-01 PROCEDURE — 6360000002 HC RX W HCPCS: Performed by: FAMILY MEDICINE

## 2020-07-01 PROCEDURE — 2000000000 HC ICU R&B

## 2020-07-01 PROCEDURE — 80048 BASIC METABOLIC PNL TOTAL CA: CPT

## 2020-07-01 PROCEDURE — 85027 COMPLETE CBC AUTOMATED: CPT

## 2020-07-01 PROCEDURE — 36415 COLL VENOUS BLD VENIPUNCTURE: CPT

## 2020-07-01 PROCEDURE — 97535 SELF CARE MNGMENT TRAINING: CPT

## 2020-07-01 PROCEDURE — 2580000003 HC RX 258: Performed by: INTERNAL MEDICINE

## 2020-07-01 PROCEDURE — 2700000000 HC OXYGEN THERAPY PER DAY

## 2020-07-01 PROCEDURE — 71045 X-RAY EXAM CHEST 1 VIEW: CPT

## 2020-07-01 PROCEDURE — 36600 WITHDRAWAL OF ARTERIAL BLOOD: CPT

## 2020-07-01 PROCEDURE — 6360000002 HC RX W HCPCS: Performed by: INTERNAL MEDICINE

## 2020-07-01 PROCEDURE — 97110 THERAPEUTIC EXERCISES: CPT

## 2020-07-01 RX ADMIN — FAMOTIDINE 20 MG: 20 TABLET ORAL at 08:20

## 2020-07-01 RX ADMIN — PIPERACILLIN AND TAZOBACTAM 3.38 G: 3; .375 INJECTION, POWDER, LYOPHILIZED, FOR SOLUTION INTRAVENOUS at 08:19

## 2020-07-01 RX ADMIN — PIPERACILLIN AND TAZOBACTAM 3.38 G: 3; .375 INJECTION, POWDER, LYOPHILIZED, FOR SOLUTION INTRAVENOUS at 18:07

## 2020-07-01 RX ADMIN — PIPERACILLIN AND TAZOBACTAM 3.38 G: 3; .375 INJECTION, POWDER, LYOPHILIZED, FOR SOLUTION INTRAVENOUS at 01:23

## 2020-07-01 RX ADMIN — ENOXAPARIN SODIUM 40 MG: 40 INJECTION SUBCUTANEOUS at 08:20

## 2020-07-01 RX ADMIN — ACETAMINOPHEN 650 MG: 325 TABLET ORAL at 15:31

## 2020-07-01 RX ADMIN — RILUZOLE 50 MG: 50 TABLET, FILM COATED ORAL at 08:21

## 2020-07-01 RX ADMIN — RILUZOLE 50 MG: 50 TABLET, FILM COATED ORAL at 22:17

## 2020-07-01 RX ADMIN — ACETAMINOPHEN 650 MG: 325 TABLET ORAL at 04:46

## 2020-07-01 ASSESSMENT — PULMONARY FUNCTION TESTS
PIF_VALUE: 21
PIF_VALUE: 23.1
PIF_VALUE: 23.7
PIF_VALUE: 21.1
PIF_VALUE: 23.2
PIF_VALUE: 22.5

## 2020-07-01 ASSESSMENT — PAIN SCALES - GENERAL
PAINLEVEL_OUTOF10: 0

## 2020-07-01 NOTE — PROGRESS NOTES
Patient is doing well with his trach, no reported issues    Blood pressure (!) 113/91, pulse 93, temperature 98.7 °F (37.1 °C), temperature source Oral, resp. rate 20, height 5' 10\" (1.778 m), weight 128 lb 11.2 oz (58.4 kg), SpO2 91 %. Meggan Cooper- in good position and airway stable  TRACH SUTURES WERE REMOVED    POD#6 s/p trach  -continue current tach care, suction and management  -sutures have been removed  -will sign off, thanks.

## 2020-07-01 NOTE — FLOWSHEET NOTE
Writer chuy. Patient sitting up in bed. Wife present at bedside. Wife engages briefly, stating that they hope the patient will be discharged today. She relates that the patient was diagnosed with ALS in December. . Patient makes eye contact and gestuees that he would like prayer. Wife excuses herself when patient requests prayer. Patient writes notes to writer, sharing some about his life and health. Writer prayer with patient. Support and presence offered to patient and wife. Both express thanks. Spiritual Care will follow as needed.        07/01/20 1107   Encounter Summary   Services provided to: Patient and family together   Referral/Consult From: [employer]   Support System Spouse   Continue Visiting   (7/1/20)   Complexity of Encounter Moderate   Length of Encounter 15 minutes   Spiritual Assessment Completed Yes   Spiritual/Orthodox   Type Spiritual support   Assessment Calm   Intervention Active listening;Explored feelings, thoughts, concerns;Prayer;Sustaining presence/ Ministry of presence   Outcome Engaged in conversation;Expressed feelings/needs/concerns;Expressed gratitude

## 2020-07-01 NOTE — PROGRESS NOTES
Physical Therapy  DATE: 2020    NAME: Darnell Gong  MRN: [id number]   : 1965    Patient not seen this date for Physical Therapy due to:  [] Blood transfusion in progress  [] Cancel by RN  [] Hemodialysis  [x]  Refusal by Patient   [] Spine Precautions   [] Strict Bedrest  [] Surgery  [] Testing      [] Other        [] PT being discontinued at this time. Patient independent. No further needs. [] PT being discontinued at this time as the patient has been transferred to hospice care. No further needs.     Didi Rose, PTA

## 2020-07-01 NOTE — PROGRESS NOTES
Intravenous, 2 times per day, Latasha Jefferson MD, Stopped at 07/01/20 0821    sodium chloride flush 0.9 % injection 10 mL, 10 mL, Intravenous, PRN, Zeynep Painter MD    acetaminophen (TYLENOL) tablet 650 mg, 650 mg, Oral, Q6H PRN, 650 mg at 07/01/20 1531 **OR** acetaminophen (TYLENOL) suppository 650 mg, 650 mg, Rectal, Q6H PRN, Zeynep Painter MD    polyethylene glycol (GLYCOLAX) packet 17 g, 17 g, Oral, Daily PRN, Zeynep Painter MD    promethazine (PHENERGAN) tablet 12.5 mg, 12.5 mg, Oral, Q6H PRN **OR** ondansetron (ZOFRAN) injection 4 mg, 4 mg, Intravenous, Q6H PRN, Zeynep Painter MD    famotidine (PEPCID) tablet 20 mg, 20 mg, Oral, Daily, Zeynep Painter MD, 20 mg at 07/01/20 0820    enoxaparin (LOVENOX) injection 40 mg, 40 mg, Subcutaneous, Daily, Zeynep Painter MD, 40 mg at 07/01/20 0820    albuterol (PROVENTIL) nebulizer solution 2.5 mg, 2.5 mg, Nebulization, Q2H PRN, Zeynep Painter MD    riluzole (RILUTEK) tablet 50 mg, 50 mg, Oral, Q12H, Zeynep Painter MD, 50 mg at 07/01/20 8940      I/O (24Hr):     Intake/Output Summary (Last 24 hours) at 7/1/2020 1925  Last data filed at 7/1/2020 0900  Gross per 24 hour   Intake 641 ml   Output 550 ml   Net 91 ml       Data:           Labs:    Hematology:  Recent Labs     06/29/20  0432 06/30/20  0507 07/01/20  0417   WBC 8.1 8.1 9.0   RBC 3.75* 3.44* 3.69*   HGB 11.9* 10.9* 11.8*   HCT 36.8* 33.6* 35.5*   MCV 98.1 97.7 96.2   MCH 31.7 31.7 32.0   MCHC 32.3 32.4 33.2   RDW 13.2 13.1 12.8   * 119* 129*   MPV 11.2 10.1 10.3     Chemistry:  Recent Labs     06/29/20  0432 06/30/20  0507 07/01/20  0417    139 138   K 4.2 3.7 3.6*    100 100   CO2 31 32* 25   GLUCOSE 111* 119* 102*   BUN 4* 5* 9   CREATININE 0.49* 0.43* 0.55*   ANIONGAP 8* 7* 13   LABGLOM >60 >60 >60   GFRAA >60 >60 >60   CALCIUM 8.9 8.6 9.1     No results for input(s): PROT, LABALBU, LABA1C, D0NMXQI, R7YJGKI, FT4, TSH, AST, ALT, LDH, GGT, ALKPHOS, LABGGT, BILITOT, BILIDIR, AMMONIA, AMYLASE, LIPASE, LACTATE, CHOL, HDL, LDLCHOLESTEROL, CHOLHDLRATIO, TRIG, VLDL, YWN60QR, PHENYTOIN, PHENYF, URICACID, POCGLU in the last 72 hours. Lab Results   Component Value Date/Time    SPECIAL RT HAND, 10 ML 06/30/2020 07:45 PM    SPECIAL NOT REPORTED 06/30/2020 07:45 PM     Lab Results   Component Value Date/Time    CULTURE NO GROWTH 16 HOURS 06/30/2020 07:45 PM    CULTURE NO GROWTH 16 HOURS 06/30/2020 07:45 PM       Lab Results   Component Value Date    POCPH 7.55 07/01/2020    POCPCO2 27 07/01/2020    POCPO2 77 07/01/2020    POCHCO3 23.4 07/01/2020    NBEA NOT REPORTED 07/01/2020    PBEA 1 07/01/2020    XWB4FOY 24 07/01/2020    KIJB2URE 97 07/01/2020    FIO2 21.0 07/01/2020       Radiology:    Xr Chest Portable    Result Date: 6/30/2020  Stable chest including subcutaneous emphysema. Xr Chest Portable    Result Date: 6/29/2020  Stable tracheostomy. No acute cardiopulmonary disease. Redemonstration of subcutaneous emphysema in the right lower neck. Xr Chest Portable    Result Date: 6/28/2020  No definite acute cardiopulmonary process. Subcutaneous emphysema in the right supraclavicular region. Xr Chest Portable    Result Date: 6/27/2020  1. No acute cardiopulmonary disease. 2. Stable tracheostomy. 3. Nonspecific subcutaneous gas within the right lower neck soft tissues. Clinical correlation is advised. Xr Chest Portable    Result Date: 6/26/2020  No acute cardiopulmonary disease. Tracheostomy in appropriate position. Xr Chest Portable    Result Date: 6/26/2020  Negative portable chest.     Fl Modified Barium Swallow W Video    Result Date: 6/29/2020  Flash laryngeal penetration of thin liquid consistency. Otherwise, no laryngeal penetration or tracheal aspiration of the remaining tested consistencies. Extended mastication. Please see separate speech pathology report for full discussion of findings and recommendations.          All radiological studies reviewed  Code Status:  Full

## 2020-07-01 NOTE — PLAN OF CARE
Problem: Falls - Risk of:  Goal: Will remain free from falls  Description: Will remain free from falls  Outcome: Ongoing   Pt remains free from falls and in fall precautions at this time. Bed is in lowest position with all wheels locked and 3/4 side rails up. Call light, bedside table, and personal belongings within reach of pt. Nurse educated on proper use of call light. Pt acknowledged teaching. Pt wearing nonskid socks when out of bed and has steady gait. Nurse will continue to assess and monitor pt with hourly rounds and offer toileting. Problem: Falls - Risk of:  Goal: Absence of physical injury  Description: Absence of physical injury  Outcome: Ongoing   Pt remains free from any physical injuries at this time. Will continue to provide a safe environment for pt. Will continue to assess and monitor pt with hourly rounds. Problem: Breathing Pattern - Ineffective:  Goal: Ability to achieve and maintain a regular respiratory rate will improve  Description: Ability to achieve and maintain a regular respiratory rate will improve  Outcome: Ongoing   Patients respiratory status stable at this time. No signs or symptoms of distress noted. Respirations non-labored and regular. 02 via home Trilogy via trach in place as needed to maintain sp02>90% or per md order. Continuous SpO2 monitoring in place. Problem: Pain:  Goal: Control of acute pain  Description: Control of acute pain  Outcome: Ongoing   Pt medicated with pain medication prn. Assessed all pain characteristics including level, type, location, frequency, and onset. Non-pharmacologic interventions offered to pt as well. Pt states pain is tolerable at this time. Will continue to monitor.

## 2020-07-01 NOTE — PROGRESS NOTES
Occupational Therapy  Facility/Department: [facility] ICU  Daily Treatment Note  NAME: Sabina Cage  : 1965  MRN: [id number]    Date of Service: 2020    Discharge Recommendations:  Continue to assess pending progress, Subacute/Skilled Nursing Facility       Assessment   Performance deficits / Impairments: Decreased functional mobility ; Decreased ADL status; Decreased strength;Decreased safe awareness;Decreased balance;Decreased endurance;Decreased posture  Prognosis: Good  OT Education: OT Role;Energy Conservation; ADL Adaptive Strategies;Transfer Training;Family Education;Plan of Care;Home Exercise Program  REQUIRES OT FOLLOW UP: Yes  Activity Tolerance  Activity Tolerance: Patient Tolerated treatment well;Patient limited by fatigue  Safety Devices  Safety Devices in place: Yes  Type of devices: Call light within reach;Nurse notified;Gait belt;Patient at risk for falls; Left in bed         Patient Diagnosis(es): There were no encounter diagnoses. has a past medical history of Amyotrophic lateral sclerosis (ALS) ([facility] [address] Back pain, and Degenerative disc disease. has a past surgical history that includes Appendectomy; Cystocopy (10/05/2016); Lithotripsy (Right, 10/19/2016); Colonoscopy (); lumbar fusion (2018); pr lumbar spine fusn,post intrbdy (N/A, 2018); and tracheostomy (N/A, 2020). Restrictions  Restrictions/Precautions  Restrictions/Precautions: General Precautions, Fall Risk  Required Braces or Orthoses?: No  Subjective   General  Chart Reviewed: Yes  Patient assessed for rehabilitation services?: Yes  Response to previous treatment: Patient with no complaints from previous session  Family / Caregiver Present: No      Orientation  Orientation  Overall Orientation Status: Within Normal Limits  Objective    ADL  Grooming: Setup;Supervision(long sitting in bed)  Additional Comments: RN reports patient had a fever of 101.1 last night and has a fever today of 99.5.  Patient refused to be OOB d/t not feeling well this date        Functional Mobility  Functional Mobility Comments: Patient refused OOB activity this date  Bed mobility  Comment: Patient able to adjust self in bed with slow guarded movements d/t trach   Transfers  Transfer Comments: Patient refused d/t not feeling well                       Cognition  Overall Cognitive Status: WNL  Cognition Comment: communication board for patient to answer/ask questions                                         Plan   Plan  Times per week: 4-5x/week, 1-2x/day  Current Treatment Recommendations: Strengthening, Balance Training, Functional Mobility Training, Endurance Training, Equipment Evaluation, Education, & procurement, Patient/Caregiver Education & Training, Self-Care / ADL, Safety Education & Training, Positioning    AM-PAC Score        AM-PAC Inpatient Daily Activity Raw Score: 12 (07/01/20 1255)  AM-PAC Inpatient ADL T-Scale Score : 30.6 (07/01/20 1255)  ADL Inpatient CMS 0-100% Score: 66.57 (07/01/20 1255)  ADL Inpatient CMS G-Code Modifier : CL (07/01/20 1255)    Goals  Short term goals  Time Frame for Short term goals: by discharge, pt will  Short term goal 1: demo min A/CGA with ADL transfers with good safety and approp AD/DME  Short term goal 2: demo CGA with toileting routine with good safety/approp DME  Short term goal 3: demo min A with UB/LB ADLs at approp level with good safety  Short term goal 4: demo SBA with bed mob with rails/controls as needed  Short term goal 5: demo and verb good understanding of fall prevention techs, EC/WS techs, equip needs, and B UE HEP   Patient Goals   Patient goals : to go home       Therapy Time   Individual Concurrent Group Co-treatment   Time In 1150         Time Out 1214         Minutes 24           Upon writer exit, call light within reach, pt retired to bed. All lines intact and patient positioned comfortably. All patient needs addressed prior to ending therapy session.  Chart reviewed prior to treatment and patient is agreeable for therapy. RN reports patient is medically stable for therapy treatment this date.       DOMINICK Perea/RENE

## 2020-07-01 NOTE — PROGRESS NOTES
RN  · Will follow with you    Geoffrey Roman MD, [facility]  Pulmonary Critical Care and Sleep Medicine,  [facility]  Cell: [phone]  Office: [phone]

## 2020-07-02 ENCOUNTER — APPOINTMENT (OUTPATIENT)
Dept: CT IMAGING | Age: 55
DRG: 004 | End: 2020-07-02
Attending: FAMILY MEDICINE
Payer: MEDICARE

## 2020-07-02 ENCOUNTER — APPOINTMENT (OUTPATIENT)
Dept: GENERAL RADIOLOGY | Age: 55
DRG: 004 | End: 2020-07-02
Attending: FAMILY MEDICINE
Payer: MEDICARE

## 2020-07-02 LAB
ABSOLUTE EOS #: 0.23 K/UL (ref 0–0.44)
ABSOLUTE IMMATURE GRANULOCYTE: 0.04 K/UL (ref 0–0.3)
ABSOLUTE LYMPH #: 1.64 K/UL (ref 1.1–3.7)
ABSOLUTE MONO #: 0.79 K/UL (ref 0.1–1.2)
ANION GAP SERPL CALCULATED.3IONS-SCNC: 11 MMOL/L (ref 9–17)
BASOPHILS # BLD: 1 % (ref 0–2)
BASOPHILS ABSOLUTE: 0.05 K/UL (ref 0–0.2)
BILIRUBIN URINE: NEGATIVE
BUN BLDV-MCNC: 10 MG/DL (ref 6–20)
BUN/CREAT BLD: 19 (ref 9–20)
CALCIUM SERPL-MCNC: 8.9 MG/DL (ref 8.6–10.4)
CHLORIDE BLD-SCNC: 105 MMOL/L (ref 98–107)
CO2: 24 MMOL/L (ref 20–31)
COLOR: YELLOW
COMMENT UA: ABNORMAL
CREAT SERPL-MCNC: 0.53 MG/DL (ref 0.7–1.2)
DIFFERENTIAL TYPE: ABNORMAL
EOSINOPHILS RELATIVE PERCENT: 4 % (ref 1–4)
FIO2: 28
GFR AFRICAN AMERICAN: >60 ML/MIN
GFR NON-AFRICAN AMERICAN: >60 ML/MIN
GFR SERPL CREATININE-BSD FRML MDRD: ABNORMAL ML/MIN/{1.73_M2}
GFR SERPL CREATININE-BSD FRML MDRD: ABNORMAL ML/MIN/{1.73_M2}
GLUCOSE BLD-MCNC: 89 MG/DL (ref 70–99)
GLUCOSE URINE: NEGATIVE
HCT VFR BLD CALC: 31.6 % (ref 40.7–50.3)
HEMOGLOBIN: 10.5 G/DL (ref 13–17)
IMMATURE GRANULOCYTES: 1 %
KETONES, URINE: ABNORMAL
LEUKOCYTE ESTERASE, URINE: NEGATIVE
LYMPHOCYTES # BLD: 26 % (ref 24–43)
MCH RBC QN AUTO: 31.7 PG (ref 25.2–33.5)
MCHC RBC AUTO-ENTMCNC: 33.2 G/DL (ref 28.4–34.8)
MCV RBC AUTO: 95.5 FL (ref 82.6–102.9)
MONOCYTES # BLD: 12 % (ref 3–12)
NEGATIVE BASE EXCESS, ART: 2 (ref 0–2)
NITRITE, URINE: NEGATIVE
NRBC AUTOMATED: 0 PER 100 WBC
O2 DEVICE/FLOW/%: ABNORMAL
PATIENT TEMP: 37
PDW BLD-RTO: 13 % (ref 11.8–14.4)
PH UA: 8.5 (ref 5–8)
PLATELET # BLD: 127 K/UL (ref 138–453)
PLATELET ESTIMATE: ABNORMAL
PMV BLD AUTO: 10 FL (ref 8.1–13.5)
POC HCO3: 21.2 MMOL/L (ref 22–27)
POC O2 SATURATION: 97 %
POC PCO2 TEMP: ABNORMAL MM HG
POC PCO2: 29 MM HG (ref 32–45)
POC PH TEMP: ABNORMAL
POC PH: 7.47 (ref 7.35–7.45)
POC PO2 TEMP: ABNORMAL MM HG
POC PO2: 78 MM HG (ref 75–95)
POSITIVE BASE EXCESS, ART: ABNORMAL (ref 0–2)
POTASSIUM SERPL-SCNC: 3.6 MMOL/L (ref 3.7–5.3)
PROTEIN UA: NEGATIVE
RBC # BLD: 3.31 M/UL (ref 4.21–5.77)
RBC # BLD: ABNORMAL 10*6/UL
SEG NEUTROPHILS: 56 % (ref 36–65)
SEGMENTED NEUTROPHILS ABSOLUTE COUNT: 3.65 K/UL (ref 1.5–8.1)
SODIUM BLD-SCNC: 140 MMOL/L (ref 135–144)
SPECIFIC GRAVITY UA: 1.01 (ref 1–1.03)
TCO2 (CALC), ART: 22 MMOL/L (ref 23–28)
TURBIDITY: CLEAR
URINE HGB: NEGATIVE
UROBILINOGEN, URINE: NORMAL
WBC # BLD: 6.4 K/UL (ref 3.5–11.3)
WBC # BLD: ABNORMAL 10*3/UL

## 2020-07-02 PROCEDURE — 74176 CT ABD & PELVIS W/O CONTRAST: CPT

## 2020-07-02 PROCEDURE — 99232 SBSQ HOSP IP/OBS MODERATE 35: CPT | Performed by: INTERNAL MEDICINE

## 2020-07-02 PROCEDURE — 36600 WITHDRAWAL OF ARTERIAL BLOOD: CPT

## 2020-07-02 PROCEDURE — 94761 N-INVAS EAR/PLS OXIMETRY MLT: CPT

## 2020-07-02 PROCEDURE — 87205 SMEAR GRAM STAIN: CPT

## 2020-07-02 PROCEDURE — 80048 BASIC METABOLIC PNL TOTAL CA: CPT

## 2020-07-02 PROCEDURE — 87070 CULTURE OTHR SPECIMN AEROBIC: CPT

## 2020-07-02 PROCEDURE — 6370000000 HC RX 637 (ALT 250 FOR IP): Performed by: FAMILY MEDICINE

## 2020-07-02 PROCEDURE — 97116 GAIT TRAINING THERAPY: CPT

## 2020-07-02 PROCEDURE — 71045 X-RAY EXAM CHEST 1 VIEW: CPT

## 2020-07-02 PROCEDURE — 2580000003 HC RX 258: Performed by: INTERNAL MEDICINE

## 2020-07-02 PROCEDURE — 6360000002 HC RX W HCPCS: Performed by: INTERNAL MEDICINE

## 2020-07-02 PROCEDURE — 6360000002 HC RX W HCPCS: Performed by: FAMILY MEDICINE

## 2020-07-02 PROCEDURE — 82803 BLOOD GASES ANY COMBINATION: CPT

## 2020-07-02 PROCEDURE — 97530 THERAPEUTIC ACTIVITIES: CPT

## 2020-07-02 PROCEDURE — 36415 COLL VENOUS BLD VENIPUNCTURE: CPT

## 2020-07-02 PROCEDURE — 2000000000 HC ICU R&B

## 2020-07-02 PROCEDURE — 2700000000 HC OXYGEN THERAPY PER DAY

## 2020-07-02 PROCEDURE — 85025 COMPLETE CBC W/AUTO DIFF WBC: CPT

## 2020-07-02 RX ADMIN — PIPERACILLIN AND TAZOBACTAM 3.38 G: 3; .375 INJECTION, POWDER, LYOPHILIZED, FOR SOLUTION INTRAVENOUS at 19:40

## 2020-07-02 RX ADMIN — PIPERACILLIN AND TAZOBACTAM 3.38 G: 3; .375 INJECTION, POWDER, LYOPHILIZED, FOR SOLUTION INTRAVENOUS at 10:22

## 2020-07-02 RX ADMIN — RILUZOLE 50 MG: 50 TABLET, FILM COATED ORAL at 20:58

## 2020-07-02 RX ADMIN — ENOXAPARIN SODIUM 40 MG: 40 INJECTION SUBCUTANEOUS at 09:30

## 2020-07-02 RX ADMIN — RILUZOLE 50 MG: 50 TABLET, FILM COATED ORAL at 09:50

## 2020-07-02 RX ADMIN — PIPERACILLIN AND TAZOBACTAM 3.38 G: 3; .375 INJECTION, POWDER, LYOPHILIZED, FOR SOLUTION INTRAVENOUS at 00:27

## 2020-07-02 RX ADMIN — FAMOTIDINE 20 MG: 20 TABLET ORAL at 09:51

## 2020-07-02 ASSESSMENT — PAIN SCALES - GENERAL
PAINLEVEL_OUTOF10: 0
PAINLEVEL_OUTOF10: 0

## 2020-07-02 ASSESSMENT — ENCOUNTER SYMPTOMS
RESPIRATORY NEGATIVE: 1
GASTROINTESTINAL NEGATIVE: 1
ALLERGIC/IMMUNOLOGIC NEGATIVE: 1

## 2020-07-02 ASSESSMENT — PULMONARY FUNCTION TESTS
PIF_VALUE: 22.5
PIF_VALUE: 21.5
PIF_VALUE: 20
PIF_VALUE: 21
PIF_VALUE: 21
PIF_VALUE: 35
PIF_VALUE: 45

## 2020-07-02 NOTE — PROGRESS NOTES
[facility]  Progress Note    Curt Akhtar  MRN: [id number]  Date: 7/2/2020    Reason for F/U :   Fever    Impression :   1. Amyotrophic lateral sclerosis  2. Acute on chronic hypercapnic respiratory failure status post tracheostomy  3. Questionable left lower lobe infiltrate versus atelectasis  4. Fever-unclear source   5. Deep tissue injury in the coccygeal area    Recommendations:   · The chest x-ray shows improved aeration in the left lower lobe and this likely suggests atelectasis rather than pneumonia. · Blood cultures so far remain negative. · The patient remains on Zosyn and has low-grade fevers. · Clinically he is doing well. · He does have a history of obstructive uropathy and does not report ever having this addressed and therefore I will order CT scan of the abdomen pelvis-stone protocol to ensure no obstructive process. Infection Control Recommendations:   Universal precautions    Discharge Planning:   Estimated Length of IV antimicrobials: To be determined  Patient will need Midline Catheter Insertion/ PICC line Insertion: No  Patient will need: Home IV , [place],  SNF,  LTAC: Undetermined  Patient willneed outpatient wound care: No    MedicalDecision making / Summary of Stay:   Curt Akhtar is a 53y.o.-year-old male who was initially admitted on 6/25/2020. Ilan Almeida has a history of mild atrophic lateral sclerosis and he came into the hospital with progressive respiratory and neurological decline with acute on chronic respiratory failure. The patient underwent a tracheostomy on 6/26/2020 and had some agitation after the tracheostomy requiring Versed and propofol sedation. He was subsequently able to wean off sedation and was able to wean off the ventilator during the daytime and placed on CPAP at night.     The patient did develop fever up to 101 degrees overnight and a sepsis work-up was started including blood cultures and empiric antibiotics with Zosyn.   The chest x-ray that was previously clear seem to suggest a possible left lower lobe infiltrate. The fevers have persisted today and I been asked to evaluate and help with antibiotic choice. Current evaluation:2020    /79   Pulse 76   Temp 99.8 °F (37.7 °C) (Oral)   Resp 24   Ht 5' 10\" (1.778 m)   Wt 128 lb 11.2 oz (58.4 kg)   SpO2 97%   BMI 18.47 kg/m²     Temperature Range: Temp: 99.8 °F (37.7 °C) Temp  Av.3 °F (37.4 °C)  Min: 97.8 °F (36.6 °C)  Max: 100.4 °F (38 °C)  The patient is seen and evaluated at bedside he is awake and alert on the ventilator does have some low-grade fevers today. He is feeling good wants to go home today. No abdominal pain nausea vomiting or diarrhea. Review of Systems   Constitutional: Negative. Respiratory: Negative. Cardiovascular: Negative. Gastrointestinal: Negative. Genitourinary: Negative. Musculoskeletal: Negative. Allergic/Immunologic: Negative. Neurological: Negative. Physical Examination :     Physical Exam  Constitutional:       Appearance: He is well-developed. HENT:      Head: Normocephalic and atraumatic. Neck:      Musculoskeletal: Normal range of motion and neck supple. Comments: Tracheostomy  Cardiovascular:      Rate and Rhythm: Normal rate. Heart sounds: Normal heart sounds. No friction rub. No gallop. Pulmonary:      Effort: Pulmonary effort is normal.      Breath sounds: Rhonchi (Few scattered rhonchi) present. No wheezing. Abdominal:      General: Bowel sounds are normal.      Palpations: Abdomen is soft. There is no mass. Tenderness: There is no abdominal tenderness. Musculoskeletal: Normal range of motion. Lymphadenopathy:      Cervical: No cervical adenopathy. Skin:     General: Skin is warm and dry. Neurological:      Mental Status: He is alert and oriented to person, place, and time.          Laboratory data:   I have independently reviewed the followinglabs:  CBC with Differential:   Recent Labs     07/01/20 0417 07/02/20  0311   WBC 9.0 6.4   HGB 11.8* 10.5*   HCT 35.5* 31.6*   * 127*   LYMPHOPCT  --  26   MONOPCT  --  12     BMP:   Recent Labs     07/01/20 0417 07/02/20 0311    140   K 3.6* 3.6*    105   CO2 25 24   BUN 9 10   CREATININE 0.55* 0.53*     Hepatic Function Panel: No results for input(s): PROT, LABALBU, BILIDIR, IBILI, BILITOT, ALKPHOS, ALT, AST in the last 72 hours. No results for input(s): VANCOTROUGH in the last 72 hours. No results found for: CRP  Lab Results   Component Value Date    SEDRATE 8 10/18/2019       No results for input(s): PROCAL in the last 72 hours. Imaging Studies:   ONE XRAY VIEW OF THE CHEST 7/2/2020 5:34 am  Impression   Improved aeration at the left lung base, likely representing resolving   atelectasis.  Continued subcutaneous emphysema over the right clavicle,   slightly decreased.           Cultures:     Culture, Respiratory [[id number]] Collected: 07/02/20 0230   Order Status: Completed Specimen: Tracheal Aspirate Updated: 07/02/20 1118    Specimen Description . TRACHEAL ASPIRATE    Special Requests NOT REPORTED    Direct Exam < 10 EPITHELIAL CELLS/LPF     <10 NEUTROPHILS/LPF     NO ORGANISMS SEEN    Culture PENDING   Culture, Blood 1 [[id number]] Collected: 06/30/20 1945   Order Status: Completed Specimen: Blood Updated: 07/02/20 1031    Specimen Description . BLOOD    Special Requests RT HAND, 10 ML    Culture NO GROWTH 2 DAYS   Culture, Blood 1 [[id number]] Collected: 06/30/20 1945   Order Status: Completed Specimen: Blood Updated: 07/02/20 1031    Specimen Description . BLOOD    Special Requests NOT REPORTED    Culture NO GROWTH 2 DAYS       Medications:      piperacillin-tazobactam  3.375 g Intravenous Q8H    sodium chloride flush  10 mL Intravenous 2 times per day    famotidine  20 mg Oral Daily    enoxaparin  40 mg Subcutaneous Daily    riluzole  50 mg Oral Q12H           [facility]  Saul Waterman MD  [employer]  OFFICE: [phone]      Electronically signed by Saul Waterman MD on 7/2/2020 at 1:56 PM  Thank you for allowing us to participate in the care of this patient. Please call with questions. This note iscreated with the assistance of a speech recognition program.  While intending to generate a document that actually reflects the content of the visit, the document can still have some errors including those of syntax andsound a like substitutions which may escape proof reading. In such instances, actual meaning can be extrapolated by contextual diversion.

## 2020-07-02 NOTE — CARE COORDINATION
Discharge planning    Received fax at  from [facility] requesting new testing, ,H&P , progress notes and ABGs. All of this was sent yesterday. Call to [employer] to see if will hold up dc due to needing certificate of medical necessity. Dairl Hutchinson called back from [employer] and she will review material ( she had [id number] to have the new abgs, progress notes and H&P) faxed to her    RT will completed new testing as patient has been using trilogy with 02 for last 2 days. Rylie stated if all is in order can set up discharge for tomorrow am and she can follow up with family. Call her at [phone] in am to let her know    Discussed with jeremías SEVILLA regarding the above she has completed the cristian and did fax to [employer] to notify of the trach needs.

## 2020-07-02 NOTE — CARE COORDINATION
Discharge planning    Chart reviewed. ID was consulted last night     May need IV atb at home and will follow with ID> will send med sheets, ID note and face sheet to [facility] for potential IV atb. ID    Impression:   1. Amyotrophic lateral sclerosis  2. Acute on chronic hypercapnic respiratory failure status post tracheostomy  3. Fever-unclear source and there is a questionable for left lower lobe infiltrate versus right foot cellulitis  4. Deep tissue injury in the coccygeal area     Recommendations   · Continue Zosyn empirically  · Blood cultures x2 sets sent  · We will monitor the culture data and his clinical progress  · Tissue offloading for the deep tissue injury to the coccyx      Will need to follow up with   [phone] ext [phone]  2. [place] for ensure  3. [employer]. And who is supplying trach supplies.  Does patient need RX or just on JEY

## 2020-07-02 NOTE — PROGRESS NOTES
[address] Hospitalist        2020   3:16 PM    Name:  Sejal Christensen  MRN:    [id number]     Shyladustyirajide:     [de-identified]   Room:  [address] [place] [address] Day: 7     Admit Date: 2020  2:39 PM  PCP: Sanam Miranda MD    C/C: No chief complaint on file. Assessment:      Fever  LLL infiltrate vs R Foot cellulitis  Acute on chronic hypoxemic respiratory failure  Amyotrophic lateral sclerosis  Progressive weakness  Dysarthria/aphonia  Osteoarthritis of multiple joint  Low back pain  Nephrolithiasis  Tracheostomy dated 2020  Mechanical ventilation dated 2022  Adjustment disorder        Plan:        · Patient is admitted to medical ICU  · Ostomy has been more than 90%  · Telemetry  · Check vital signs closely  · CBC BMP daily  · BiPAP as needed  · Bronchodilators DuoNeb  · Soft diet  · IV fluid hydration  · Critical care/pulmonology following  · Continue Rilutek  · Lovenox for DVT prophylaxis  ·  empiric Zosyn therapy  · CXR from 20  shows improvement   · Blood cultures no growth so far  · Chest x-ray does show questionable left lower infiltrate  · DVT and GI prophylaxis  · CT abd/pelvis today    DC planning as per ID recs    Subjective:     Patient seen and examined at bedside, patient continues to have fever  Patient does not have any complaints today  Patient is currently  on IV Zosyn  No headache no palpitation      ROS:  A 10 point system reviewed and negative otherwise mentioned above. Physical Examination:      Vitals:  /79   Pulse 76   Temp 100 °F (37.8 °C) (Oral)   Resp 24   Ht 5' 10\" (1.778 m)   Wt 128 lb 11.2 oz (58.4 kg)   SpO2 97%   BMI 18.47 kg/m²   Temp (24hrs), Av.4 °F (37.4 °C), Min:97.8 °F (36.6 °C), Max:100.4 °F (38 °C)    Weight:   Wt Readings from Last 3 Encounters:   20 128 lb 11.2 oz (58.4 kg)   20 130 lb (59 kg)   20 130 lb (59 kg)     I/O last 3 completed shifts:  I/O last 3 completed shifts:   In: 450 [I.V.:450]  Out: 550 [Urine:550]     No results for input(s): POCGLU in the last 72 hours. General appearance - alert, well appearing, and in no acute distress  Mental status - oriented to person, place, and time with normal affect  Head - normocephalic and atraumatic  Eyes - pupils equal and reactive, extraocular eye movements intact, conjunctiva clear  Ears - hearing appears to be intact  Nose - no drainage noted  Mouth - mucous membranes moist  Neck - supple, no carotid bruits, thyroid not palpable, tracheostomy in place  Chest - clear to auscultation, normal effort  Heart - normal rate, regular rhythm, no murmur  Abdomen - soft, nontender, nondistended, bowel sounds present all four quadrants, no masses, hepatomegaly or splenomegaly  Neurological -unable to assess t  Extremities - peripheral pulses palpable, no pedal edema or calf pain with palpation  Skin - no gross lesions, rashes, or induration noted        Medications: Allergies:    Allergies   Allergen Reactions    Sulfa Antibiotics Other (See Comments)     As child (unknown reaction)       Current Meds:   Current Facility-Administered Medications:     [COMPLETED] piperacillin-tazobactam (ZOSYN) 4.5 g in dextrose 5 % 100 mL IVPB (mini-bag), 4.5 g, Intravenous, Once, Stopped at 06/30/20 2045 **AND** piperacillin-tazobactam (ZOSYN) 3.375 g in dextrose 5 % 50 mL IVPB extended infusion (mini-bag), 3.375 g, Intravenous, Q8H, [facility] RICHY Cruz MD, Last Rate: 12.5 mL/hr at 07/02/20 1022, 3.375 g at 07/02/20 1022    LORazepam (ATIVAN) injection 0.5 mg, 0.5 mg, Intravenous, Q4H PRN, Wes Gunderson MD    ipratropium-albuterol (DUONEB) nebulizer solution 1 ampule, 1 ampule, Inhalation, Q4H PRN, Zeynep Painter MD    fentaNYL (SUBLIMAZE) injection 50 mcg, 50 mcg, Intravenous, Q1H PRN, Wes Gunderson MD, 50 mcg at 06/29/20 2105    sodium chloride flush 0.9 % injection 10 mL, 10 mL, Intravenous, 2 times per day, Joceline Mancilla MD, Stopped at 07/01/20 0444    sodium chloride flush 0.9 % injection 10 mL, 10 mL, Intravenous, PRN, Zeynep Painter MD    acetaminophen (TYLENOL) tablet 650 mg, 650 mg, Oral, Q6H PRN, 650 mg at 07/01/20 1531 **OR** acetaminophen (TYLENOL) suppository 650 mg, 650 mg, Rectal, Q6H PRN, Zeynep Painter MD    polyethylene glycol (GLYCOLAX) packet 17 g, 17 g, Oral, Daily PRN, Zeynep Painter MD    promethazine (PHENERGAN) tablet 12.5 mg, 12.5 mg, Oral, Q6H PRN **OR** ondansetron (ZOFRAN) injection 4 mg, 4 mg, Intravenous, Q6H PRN, Zeynep Painter MD    famotidine (PEPCID) tablet 20 mg, 20 mg, Oral, Daily, Zeynep Painter MD, 20 mg at 07/02/20 0951    enoxaparin (LOVENOX) injection 40 mg, 40 mg, Subcutaneous, Daily, Zeynep Painter MD, 40 mg at 07/02/20 0930    albuterol (PROVENTIL) nebulizer solution 2.5 mg, 2.5 mg, Nebulization, Q2H PRN, Zeynep Painter MD    riluzole (RILUTEK) tablet 50 mg, 50 mg, Oral, Q12H, Zeynep Painter MD, 50 mg at 07/02/20 0950      I/O (24Hr):     Intake/Output Summary (Last 24 hours) at 7/2/2020 1516  Last data filed at 7/2/2020 0550  Gross per 24 hour   Intake 450 ml   Output 550 ml   Net -100 ml       Data:           Labs:    Hematology:  Recent Labs     06/30/20  0507 07/01/20  0417 07/02/20  0311   WBC 8.1 9.0 6.4   RBC 3.44* 3.69* 3.31*   HGB 10.9* 11.8* 10.5*   HCT 33.6* 35.5* 31.6*   MCV 97.7 96.2 95.5   MCH 31.7 32.0 31.7   MCHC 32.4 33.2 33.2   RDW 13.1 12.8 13.0   * 129* 127*   MPV 10.1 10.3 10.0     Chemistry:  Recent Labs     06/30/20  0507 07/01/20  0417 07/02/20  0311    138 140   K 3.7 3.6* 3.6*    100 105   CO2 32* 25 24   GLUCOSE 119* 102* 89   BUN 5* 9 10   CREATININE 0.43* 0.55* 0.53*   ANIONGAP 7* 13 11   LABGLOM >60 >60 >60   GFRAA >60 >60 >60   CALCIUM 8.6 9.1 8.9     No results for input(s): PROT, LABALBU, LABA1C, A8OAXYH, H9MEWWH, FT4, TSH, AST, ALT, LDH, GGT, ALKPHOS, LABGGT, BILITOT, BILIDIR, AMMONIA, AMYLASE, LIPASE, LACTATE, CHOL, HDL, LDLCHOLESTEROL, CHOLHDLRATIO, TRIG, VLDL, ECZ49JP, PHENYTOIN, PHENYF, URICACID, POCGLU in the last 72 hours. Lab Results   Component Value Date/Time    SPECIAL NOT REPORTED 07/02/2020 02:30 AM     Lab Results   Component Value Date/Time    CULTURE PENDING 07/02/2020 02:30 AM       Lab Results   Component Value Date    POCPH 7.47 07/02/2020    POCPCO2 29 07/02/2020    POCPO2 78 07/02/2020    POCHCO3 21.2 07/02/2020    NBEA 2 07/02/2020    PBEA NOT REPORTED 07/02/2020    KEV3RKS 22 07/02/2020    UGCZ0HSA 97 07/02/2020    FIO2 28.0 07/02/2020       Radiology:    Xr Chest Portable    Result Date: 6/30/2020  Stable chest including subcutaneous emphysema. Xr Chest Portable    Result Date: 6/29/2020  Stable tracheostomy. No acute cardiopulmonary disease. Redemonstration of subcutaneous emphysema in the right lower neck. Xr Chest Portable    Result Date: 6/28/2020  No definite acute cardiopulmonary process. Subcutaneous emphysema in the right supraclavicular region. Xr Chest Portable    Result Date: 6/27/2020  1. No acute cardiopulmonary disease. 2. Stable tracheostomy. 3. Nonspecific subcutaneous gas within the right lower neck soft tissues. Clinical correlation is advised. Xr Chest Portable    Result Date: 6/26/2020  No acute cardiopulmonary disease. Tracheostomy in appropriate position. Xr Chest Portable    Result Date: 6/26/2020  Negative portable chest.     Fl Modified Barium Swallow W Video    Result Date: 6/29/2020  Flash laryngeal penetration of thin liquid consistency. Otherwise, no laryngeal penetration or tracheal aspiration of the remaining tested consistencies. Extended mastication. Please see separate speech pathology report for full discussion of findings and recommendations.          All radiological studies reviewed  Code Status:  Full Code        Electronically signed by Flor Keating MD on 7/2/2020 at 3:16 PM    This note was created with the assistance of a speech-recognition program.  Although the intention is to generate a document that actually reflects the content of the visit, no guarantees can be provided that every mistake has been identified and corrected by editing. Note was updated later by me after  physical examination and  completion of the assessment.

## 2020-07-02 NOTE — FLOWSHEET NOTE
Writer rounding. Patient sitting up in bed. There are no visitors. Patient responds to writer with gestures, nods, etc. He expresses no needs at present. Writer offers brief conversation and support. Spiritual Care will follow as needed.        07/02/20 0924   Encounter Summary   Services provided to: Patient   Referral/Consult From: [place]   Continue Visiting   (7/2/20)   Complexity of Encounter Low   Length of Encounter 15 minutes   Spiritual Assessment Completed Yes   Routine   Type Follow up   Assessment Approachable   Intervention Active listening;Sustaining presence/ Ministry of presence   Outcome Engaged in conversation  (gestures)

## 2020-07-02 NOTE — PLAN OF CARE
Problem: Falls - Risk of:  Goal: Will remain free from falls  Description: Will remain free from falls  Outcome: Ongoing   Pt remains free from falls and in fall precautions at this time. Bed is in lowest position with all wheels locked and 3/4 side rails up. Call light, bedside table, and personal belongings within reach of pt. Nurse educated on proper use of call light. Pt acknowledged teaching. Pt wearing nonskid socks when out of bed and has steady gait. Nurse will continue to assess and monitor pt with hourly rounds and offer toileting. Problem: Falls - Risk of:  Goal: Absence of physical injury  Description: Absence of physical injury  Outcome: Ongoing   Pt remains free from any physical injuries at this time. Will continue to provide a safe environment for pt. Will continue to assess and monitor pt with hourly rounds. Problem: Skin Integrity:  Goal: Absence of new skin breakdown  Description: Absence of new skin breakdown  Outcome: Ongoing   Continuing to monitor for skin integrity risks. Patient intervention includes supplements: zinc. Turning/repositioning encouraged at least once every 2 hrs, and prn basis. Hygiene care being completed independently per patient; assistance provided when necessary. Wound: Venous ulcer. No evidence of any new skin integrity issues noted. Problem: Breathing Pattern - Ineffective:  Goal: Ability to achieve and maintain a regular respiratory rate will improve  Description: Ability to achieve and maintain a regular respiratory rate will improve  Outcome: Ongoing   Patients respiratory status stable at this time. No signs or symptoms of distress noted. Respirations non-labored and regular. 02 via trach to home Trilogy set up in place as needed to maintain sp02>90% or per md order. Continuous SpO2 monitoring in place. Problem: Pain:  Goal: Control of acute pain  Description: Control of acute pain  Outcome: Ongoing   Pt medicated with pain medication prn. Assessed all pain characteristics including level, type, location, frequency, and onset. Non-pharmacologic interventions offered to pt as well. Pt states pain is tolerable at this time. Will continue to monitor.

## 2020-07-02 NOTE — PROGRESS NOTES
Occupational Therapy  Facility/Department: [facility] ICU  Daily Treatment Note  NAME: Chivo Koroma  : 1965  MRN: [id number]    Date of Service: 2020    Discharge Recommendations:  Subacute/Skilled Nursing Facility       Assessment   Performance deficits / Impairments: Decreased functional mobility ; Decreased ADL status; Decreased strength;Decreased safe awareness;Decreased balance;Decreased endurance;Decreased posture  Prognosis: Good  OT Education: OT Role;Energy Conservation; ADL Adaptive Strategies;Transfer Training;Family Education;Plan of Care  Patient Education: family education provided on RW safety, home mgt, line mgt for when patient goes home with assist  REQUIRES OT FOLLOW UP: Yes  Activity Tolerance  Activity Tolerance: Patient Tolerated treatment well;Patient limited by fatigue  Safety Devices  Safety Devices in place: Yes  Type of devices: Call light within reach;Nurse notified;Gait belt;Patient at risk for falls; Left in chair         Patient Diagnosis(es): There were no encounter diagnoses. has a past medical history of Amyotrophic lateral sclerosis (ALS) ([address] Back pain, and Degenerative disc disease. has a past surgical history that includes Appendectomy; Cystocopy (10/05/2016); Lithotripsy (Right, 10/19/2016); Colonoscopy (); lumbar fusion (2018); pr lumbar spine fusn,post intrbdy (N/A, 2018); and tracheostomy (N/A, 2020).     Restrictions  Restrictions/Precautions  Restrictions/Precautions: General Precautions, Fall Risk  Required Braces or Orthoses?: No  Subjective   General  Chart Reviewed: Yes  Patient assessed for rehabilitation services?: Yes  Response to previous treatment: Patient with no complaints from previous session  Family / Caregiver Present: No      Orientation  Orientation  Overall Orientation Status: Within Normal Limits  Objective    ADL  Additional Comments: Patient declined any ADL needs this date        Balance  Sitting Balance: Stand by assistance  Standing Balance: Minimal assistance  Functional Mobility  Activity: Other  Assist Level: Minimal assistance  Functional Mobility Comments:  To take 3-4 steps to bed side chair with assist for line mgt and verbal uces for environment scanning, pacing, line awareness, pursed lip breathing to increase safety  Bed mobility  Supine to Sit: Contact guard assistance  Sit to Supine: Contact guard assistance  Scooting: Contact guard assistance  Comment: slow guarded movements  Transfers  Sit to stand: Minimal assistance  Stand to sit: Minimal assistance  Transfer Comments: verbal cues for letting writer be prepared for line mgt, tactile cues for impulsivity                       Cognition  Overall Cognitive Status: WNL  Cognition Comment: communication board for patient to answer/ask questions                                         Plan   Plan  Times per week: 4-5x/week, 1-2x/day  Current Treatment Recommendations: Strengthening, Balance Training, Functional Mobility Training, Endurance Training, Equipment Evaluation, Education, & procurement, Patient/Caregiver Education & Training, Self-Care / ADL, Safety Education & Training, Positioning  AM-PAC Score        AM-PAC Inpatient Daily Activity Raw Score: 12 (07/02/20 1312)  AM-PAC Inpatient ADL T-Scale Score : 30.6 (07/02/20 1312)  ADL Inpatient CMS 0-100% Score: 66.57 (07/02/20 1312)  ADL Inpatient CMS G-Code Modifier : CL (07/02/20 1312)    Goals  Short term goals  Time Frame for Short term goals: by discharge, pt will  Short term goal 1: demo min A/CGA with ADL transfers with good safety and approp AD/DME  Short term goal 2: demo CGA with toileting routine with good safety/approp DME  Short term goal 3: demo min A with UB/LB ADLs at approp level with good safety  Short term goal 4: demo SBA with bed mob with rails/controls as needed  Short term goal 5: demo and verb good understanding of fall prevention techs, EC/WS techs, equip needs, and B UE HEP   Patient

## 2020-07-02 NOTE — CARE COORDINATION
Discharge planning     Call to [facility] and they will supply trach supplies but will need either a script or placed specific information in the cristian: size of trach, frequency of care and changing. Moses Dubose any supplies needed , sponges ext. Notified Jossei SEVILLA and she is requesting to follow up on obtaining the actual shiley thru a [employer] company .  Call back to [facility] and spoke with Pedro Pablo Alexander again and she stated if can get 2 days worth of supplies they can have it ordered for patient by then     Updated RN Isaiah Gaffney

## 2020-07-02 NOTE — PROGRESS NOTES
Physical Therapy  Facility/Department: [facility] [[email or web address] ICU  Daily Treatment Note  NAME: Rosario Thakkar  : 1965  MRN: [id number]    Date of Service: 2020    Discharge Recommendations:  Subacute/Skilled Nursing Facility, Continue to assess pending progress        Assessment   Body structures, Functions, Activity limitations: Decreased functional mobility ; Decreased strength;Decreased endurance;Decreased balance  Prognosis: Good  Decision Making: High Complexity  REQUIRES PT FOLLOW UP: Yes  Activity Tolerance  Activity Tolerance: Patient limited by endurance     Patient Diagnosis(es): There were no encounter diagnoses. has a past medical history of Amyotrophic lateral sclerosis (ALS) ([address] Back pain, and Degenerative disc disease. has a past surgical history that includes Appendectomy; Cystocopy (10/05/2016); Lithotripsy (Right, 10/19/2016); Colonoscopy (); lumbar fusion (2018); pr lumbar spine fusn,post intrbdy (N/A, 2018); and tracheostomy (N/A, 2020).     Restrictions  Restrictions/Precautions  Restrictions/Precautions: General Precautions, Fall Risk  Required Braces or Orthoses?: No  Subjective   General  Chart Reviewed: Yes  Family / Caregiver Present: No  General Comment  Comments: OK for PT per RN          Orientation  Orientation  Overall Orientation Status: Within Normal Limits  Cognition      Objective   Bed mobility  Rolling:Contact guard assistance  Supine to sit: Contact guard assistance  Sit to supine:Contact guard assistance  Scooting: Contact guard assistance  Comments: Assistance for management of lines  Transfers  Sit to Stand: Contact guard assistance  Stand to sit: Contact guard assistance  Lateral Transfers: Contact guard assistance  Comment: Assist for line management  Ambulation  Ambulation?: Yes  More Ambulation?: No  Ambulation 1  Surface: level tile  Device: Rolling Walker  Assistance: Contact guard assistance;Minimal assistance  Quality of Gait: slightly unsteady,

## 2020-07-02 NOTE — PROGRESS NOTES
with you    Danae Longoria MD, [facility]  Pulmonary Critical Care and Sleep Medicine,  [facility]  Cell: [phone]  Office: [phone]

## 2020-07-03 ENCOUNTER — APPOINTMENT (OUTPATIENT)
Dept: GENERAL RADIOLOGY | Age: 55
DRG: 004 | End: 2020-07-03
Attending: FAMILY MEDICINE
Payer: MEDICARE

## 2020-07-03 VITALS
WEIGHT: 134 LBS | HEIGHT: 70 IN | DIASTOLIC BLOOD PRESSURE: 82 MMHG | HEART RATE: 65 BPM | BODY MASS INDEX: 19.18 KG/M2 | OXYGEN SATURATION: 96 % | RESPIRATION RATE: 18 BRPM | TEMPERATURE: 99.9 F | SYSTOLIC BLOOD PRESSURE: 113 MMHG

## 2020-07-03 LAB
ANION GAP SERPL CALCULATED.3IONS-SCNC: 14 MMOL/L (ref 9–17)
BUN BLDV-MCNC: 11 MG/DL (ref 6–20)
BUN/CREAT BLD: 19 (ref 9–20)
CALCIUM SERPL-MCNC: 8.9 MG/DL (ref 8.6–10.4)
CHLORIDE BLD-SCNC: 104 MMOL/L (ref 98–107)
CO2: 22 MMOL/L (ref 20–31)
CREAT SERPL-MCNC: 0.59 MG/DL (ref 0.7–1.2)
CULTURE: NORMAL
DIRECT EXAM: NORMAL
GFR AFRICAN AMERICAN: >60 ML/MIN
GFR NON-AFRICAN AMERICAN: >60 ML/MIN
GFR SERPL CREATININE-BSD FRML MDRD: ABNORMAL ML/MIN/{1.73_M2}
GFR SERPL CREATININE-BSD FRML MDRD: ABNORMAL ML/MIN/{1.73_M2}
GLUCOSE BLD-MCNC: 90 MG/DL (ref 70–99)
HCT VFR BLD CALC: 33 % (ref 40.7–50.3)
HEMOGLOBIN: 10.7 G/DL (ref 13–17)
Lab: NORMAL
MCH RBC QN AUTO: 31.1 PG (ref 25.2–33.5)
MCHC RBC AUTO-ENTMCNC: 32.4 G/DL (ref 28.4–34.8)
MCV RBC AUTO: 95.9 FL (ref 82.6–102.9)
NRBC AUTOMATED: 0 PER 100 WBC
PDW BLD-RTO: 13 % (ref 11.8–14.4)
PLATELET # BLD: 149 K/UL (ref 138–453)
PMV BLD AUTO: 9.6 FL (ref 8.1–13.5)
POTASSIUM SERPL-SCNC: 3.7 MMOL/L (ref 3.7–5.3)
RBC # BLD: 3.44 M/UL (ref 4.21–5.77)
SODIUM BLD-SCNC: 140 MMOL/L (ref 135–144)
SPECIMEN DESCRIPTION: NORMAL
WBC # BLD: 6.3 K/UL (ref 3.5–11.3)

## 2020-07-03 PROCEDURE — 2580000003 HC RX 258: Performed by: INTERNAL MEDICINE

## 2020-07-03 PROCEDURE — 71045 X-RAY EXAM CHEST 1 VIEW: CPT

## 2020-07-03 PROCEDURE — 36415 COLL VENOUS BLD VENIPUNCTURE: CPT

## 2020-07-03 PROCEDURE — 80048 BASIC METABOLIC PNL TOTAL CA: CPT

## 2020-07-03 PROCEDURE — 6360000002 HC RX W HCPCS: Performed by: INTERNAL MEDICINE

## 2020-07-03 PROCEDURE — 6370000000 HC RX 637 (ALT 250 FOR IP): Performed by: FAMILY MEDICINE

## 2020-07-03 PROCEDURE — 85027 COMPLETE CBC AUTOMATED: CPT

## 2020-07-03 PROCEDURE — 99232 SBSQ HOSP IP/OBS MODERATE 35: CPT | Performed by: INTERNAL MEDICINE

## 2020-07-03 PROCEDURE — 6360000002 HC RX W HCPCS: Performed by: FAMILY MEDICINE

## 2020-07-03 RX ORDER — ALBUTEROL SULFATE 2.5 MG/3ML
2.5 SOLUTION RESPIRATORY (INHALATION)
Qty: 120 EACH | Refills: 3 | Status: SHIPPED | OUTPATIENT
Start: 2020-07-03 | End: 2020-07-03 | Stop reason: HOSPADM

## 2020-07-03 RX ADMIN — ENOXAPARIN SODIUM 40 MG: 40 INJECTION SUBCUTANEOUS at 07:43

## 2020-07-03 RX ADMIN — RILUZOLE 50 MG: 50 TABLET, FILM COATED ORAL at 09:44

## 2020-07-03 RX ADMIN — PIPERACILLIN AND TAZOBACTAM 3.38 G: 3; .375 INJECTION, POWDER, LYOPHILIZED, FOR SOLUTION INTRAVENOUS at 01:04

## 2020-07-03 RX ADMIN — FAMOTIDINE 20 MG: 20 TABLET ORAL at 07:43

## 2020-07-03 RX ADMIN — PIPERACILLIN AND TAZOBACTAM 3.38 G: 3; .375 INJECTION, POWDER, LYOPHILIZED, FOR SOLUTION INTRAVENOUS at 09:15

## 2020-07-03 ASSESSMENT — PULMONARY FUNCTION TESTS
PIF_VALUE: 41
PIF_VALUE: 21

## 2020-07-03 ASSESSMENT — ENCOUNTER SYMPTOMS
ALLERGIC/IMMUNOLOGIC NEGATIVE: 1
RESPIRATORY NEGATIVE: 1
GASTROINTESTINAL NEGATIVE: 1

## 2020-07-03 ASSESSMENT — PAIN SCALES - GENERAL: PAINLEVEL_OUTOF10: 0

## 2020-07-03 NOTE — CARE COORDINATION
Discharge planning    Spoke with Jossie SEVILLA regarding potential discharge home today. Patient is elective trach on trilogy ventilator     Trilogy vent   ml   Rate set at 10 bmp  Fi02 28% with 2 liters bled in   PEEP/CPAP 5  Pressure support 6 cm  H20    Call to sydni at [facility] at [phone] and VM left to have her call me to see how to set up anticipated discharge. Anticipate need for [facility] with vent and then will inquire If patient will have 02 and trilogy set up at home      call back from [place] from [place] and explained that patient has had trilogy at home and was on 02 thru them at night only. They have switched him from NIV ventilator to invasive vent while here on trach. She has to be at home on arrival to set up secondary vent     Faxed over to [facility] an on call and called to ask them to use patient own trilogy ventilator. Discharge is pending ID input. Patient went for CT of abd last night. ID  Impression :   1. Amyotrophic lateral sclerosis  2. Acute on chronic hypercapnic respiratory failure status post tracheostomy  3. Questionable left lower lobe infiltrate versus atelectasis  4. Fever-unclear source   5. Deep tissue injury in the coccygeal area     Recommendations:   · The chest x-ray shows improved aeration in the left lower lobe and this likely suggests atelectasis rather than pneumonia. · Blood cultures so far remain negative. · The patient remains on Zosyn and has low-grade fevers. · Clinically he is doing well. · He does have a history of obstructive uropathy and does not report ever having this addressed and therefore I will order CT scan of the abdomen pelvis-stone protocol to ensure no obstructive process.

## 2020-07-03 NOTE — PROGRESS NOTES
Patient awake in bed. Patient is alert and oriented x4. Assessment completed. . Patient aefebrile at this time.

## 2020-07-03 NOTE — PROGRESS NOTES
Nutrition Assessment    Type and Reason for Visit: Reassess    Nutrition Recommendations:   1. Continue Dental Soft diet with Ensure Enlive TID  2. Monitor diet tolerance & intake    Nutrition Assessment: Patient improving from nutrition standpoint. Pt. is tolerating PO intake and is drinking the Ensure Enlives. Discussed home diet and provided RD phone number if patient or his family have any questions after discharge. Malnutrition Assessment:  · Malnutrition Status: Meets the criteria for severe malnutrition  · Context: Chronic illness  · Findings of the 6 clinical characteristics of malnutrition (Minimum of 2 out of 6 clinical characteristics is required to make the diagnosis of moderate or severe Protein Calorie Malnutrition based on AND/ASPEN Guidelines):  1. Energy Intake-Less than or equal to 75% of estimated energy requirement, Greater than or equal to 1 month    2. Weight Loss-10% loss or greater, in 6 months  3. Fat Loss-Moderate subcutaneous fat loss, Fat overlying the ribs  4. Muscle Loss-Unable to assess(MD reported weakness),    5. Fluid Accumulation-No significant fluid accumulation,    6.  Strength-Not measured    Nutrition Risk Level:  Moderate    Nutrient Needs:  · Estimated Daily Total Kcal: 6988-9210 kcal (30-33 kcal/kg)  · Estimated Daily Protein (g): 70-87 g (1.2-1.5 g/kg)  · Estimated Daily Total Fluid (ml/day): 1 mL/kcal    Nutrition Diagnosis:   · Problem: Severe malnutrition, In context of chronic illness  · Etiology: related to Catabolic illness, Other (Comment)(hypermetabolism)     Signs and symptoms:  as evidenced by Weight loss greater than or equal to 10% in 6 months, Other (Comment), Moderate loss of subcutaneous fat(pt. mom stating pt. has been eating but keeps losing weight, MD report of decreased muscle)    Objective Information:  · Nutrition-Focused Physical Findings: GI: WDL - no complaints/concerns from patient; Edema: None; Skin: WDL  · Wound Type: Surgical Wound  · Current Nutrition Therapies:  · Oral Diet Orders: Dental Soft   · Oral Diet intake: 51-75%  · Oral Nutrition Supplement (ONS) Orders: Standard High Calorie Oral Supplement  · ONS intake: %  · Anthropometric Measures:  · Ht: 5' 10\" (177.8 cm)   · Current Body Wt: 134 lb 0.6 oz (60.8 kg)  · Admission Body Wt: 125 lb (56.7 kg)(stated)  · Usual Body Wt: 149 lb 11.1 oz (67.9 kg)(6 months ago)  · % Weight Change:  ,  11.2 kg (16%) weight loss in 6 months  · Ideal Body Wt: 166 lb (75.3 kg), % Ideal Body 77%  · BMI Classification: BMI <18.5 Underweight    Nutrition Interventions:   Continue current diet, Continue current ONS  Continued Inpatient Monitoring    Nutrition Evaluation:   · Evaluation: Goals set   · Goals: PO intake to meet >75% estimated energy and protein needs; weight gain/maintenance    · Monitoring: Meal Intake, Supplement Intake, Diet Tolerance, Weight, Monitor Bowel Function      Marissa Mirza RDN, [facility]  RD Office Phone: [phone]

## 2020-07-03 NOTE — PLAN OF CARE
Problem: Skin Integrity:  Goal: Will show no infection signs and symptoms  Description: Will show no infection signs and symptoms  7/3/2020 0817 by Carlos Ornelas RN  Outcome: Ongoing  Note: Skin assessment performed and charted. Assessed for areas of redness and or breakdown. Osvaldo scale order set in place. Waffle mattress present. Patient encouraged to reposition  every 2 hours and PRN. Patient presented with an abrasion to his nose and a coccygeal wound. Patient requires much encouragement to reposition off of back. Assessed oral mucosa. Assessed for open areas, thrush and dentition. Encouraged adequate nutritional intake for promotion of wound healing and prevention of further skin breakdown. 7/2/2020 2214 by Gato Fernandez RN  Outcome: Ongoing     Problem: Nutrition  Goal: Optimal nutrition therapy  7/3/2020 0817 by Carlos Ornelas RN  Outcome: Ongoing  Note: Encouraged adequate po intake for promotion of wound healing. Patient encouraged to drink supplemental protein shakes. 7/2/2020 2214 by Gato Fernandez RN  Outcome: Ongoing     Problem: Falls - Risk of: Intervention: Appropriate assistance to ensure safe transfer  Note: Continue fall precautions including arm band identification, falling star placed outside of the room and alarm at night and when unattended. Use of ambulatory aids when indicated and frequent visual checks. Monitored orientation status. Call light within reach at all times. Bed remains in lowest locked position. Frequent nursing rounds. Fall precautions remain in place. Instructed to call with any needs for assistance prior to getting OOB. Problem: Breathing Pattern - Ineffective: Intervention: Stress management techniques  Note: Respiratory assessment performed and charted. Lung fields assessed. Monitored respiratory rate, rhythm and pattern. Monitored for any increased WOB or respiratory distress. Tracheostomy care completed every 8 hours and PRN.  Instructed to call with any c/o SOB. Problem: Pain:  Intervention: Promote participation in pain management plan  Note: Comfort level assessed at frequent intervals. Medicated PRN. Encouraged the use of both pharmacological and non-pharmacological methods of pain control. Instructed to call with any c/o pain. Monitored the effectiveness of all interventions utilized. Problem: Pain:  Intervention: Promote participation in pain management plan  Note: Comfort level assessed at frequent intervals. Medicated PRN. Encouraged the use of both pharmacological and non-pharmacological methods of pain control. Instructed to call with any c/o pain. Monitored the effectiveness of all interventions utilized.

## 2020-07-03 NOTE — PROGRESS NOTES
Patient discharged at this time with all belongings. Mom present at discharge. Patient being discharged on his home Trilogy unit. Discharge instructions reviewed with both the patient and the mom. Discharge instructions given to the patient.

## 2020-07-03 NOTE — PROGRESS NOTES
[facility]  Progress Note    Doug Lombardo  MRN: [id number]  Date: 7/3/2020    Reason for F/U :   Fever    Impression :   1. Amyotrophic lateral sclerosis  2. Acute on chronic hypercapnic respiratory failure status post tracheostomy  3. Questionable left lower lobe infiltrate versus atelectasis  4. Fever-unclear source   · Resolved and work-up negative  5. Deep tissue injury in the coccygeal area    Recommendations:   · Chest x-ray is clear abdomen does not show any evidence of pneumonia. · Blood cultures remain negative. · CT imaging of the abdomen and pelvis does not show any intra-abdominal pathology to explain the fevers. · At this point in time the fever is unexplained but it has resolved. · I will plan on stopping the Zosyn as the patient is okay for discharge home today. Infection Control Recommendations:   Universal precautions    Discharge Planning:   Patient will need Midline Catheter Insertion/ PICC line Insertion: No  Patient willneed outpatient wound care: No    MedicalDecision making / Summary of Stay:   Doug Lombardo is a 53y.o.-year-old male who was initially admitted on 6/25/2020. Chester Murray has a history of mild atrophic lateral sclerosis and he came into the hospital with progressive respiratory and neurological decline with acute on chronic respiratory failure. The patient underwent a tracheostomy on 6/26/2020 and had some agitation after the tracheostomy requiring Versed and propofol sedation. He was subsequently able to wean off sedation and was able to wean off the ventilator during the daytime and placed on CPAP at night.     The patient did develop fever up to 101 degrees overnight and a sepsis work-up was started including blood cultures and empiric antibiotics with Zosyn. The chest x-ray that was previously clear seem to suggest a possible left lower lobe infiltrate.   The fevers have persisted today and I been asked to evaluate and help with antibiotic choice. Current evaluation:7/3/2020    /82   Pulse 65   Temp 98.8 °F (37.1 °C) (Oral)   Resp 18   Ht 5' 10\" (1.778 m)   Wt 134 lb (60.8 kg)   SpO2 96%   BMI 19.23 kg/m²     Temperature Range: Temp: 98.8 °F (37.1 °C) Temp  Av.1 °F (37.3 °C)  Min: 98 °F (36.7 °C)  Max: 100.2 °F (37.9 °C)  The patient is seen and evaluated at bedside he is awake and alert in no acute distress. No subjective fever or chills. No abdominal pain nausea vomiting or diarrhea. He did still have a low-grade fever overnight. Review of Systems   Constitutional: Negative. Respiratory: Negative. Cardiovascular: Negative. Gastrointestinal: Negative. Genitourinary: Negative. Musculoskeletal: Negative. Allergic/Immunologic: Negative. Neurological: Negative. Physical Examination :     Physical Exam  Constitutional:       Appearance: He is well-developed. HENT:      Head: Normocephalic and atraumatic. Neck:      Musculoskeletal: Normal range of motion and neck supple. Comments: Tracheostomy  Cardiovascular:      Rate and Rhythm: Normal rate. Heart sounds: Normal heart sounds. No friction rub. No gallop. Pulmonary:      Effort: Pulmonary effort is normal.      Breath sounds: Rhonchi (Few scattered rhonchi) present. No wheezing. Abdominal:      General: Bowel sounds are normal.      Palpations: Abdomen is soft. There is no mass. Tenderness: There is no abdominal tenderness. Musculoskeletal: Normal range of motion. Lymphadenopathy:      Cervical: No cervical adenopathy. Skin:     General: Skin is warm and dry. Neurological:      Mental Status: He is alert and oriented to person, place, and time.          Laboratory data:   I have independently reviewed the followinglabs:  CBC with Differential:   Recent Labs     20  0311 20  0409   WBC 6.4 6.3   HGB 10.5* 10.7*   HCT 31.6* 33.0*   * 149   LYMPHOPCT 26  --    MONOPCT 12  --      BMP:   Recent Labs 07/02/20  0311 07/03/20  0409    140   K 3.6* 3.7    104   CO2 24 22   BUN 10 11   CREATININE 0.53* 0.59*     Hepatic Function Panel: No results for input(s): PROT, LABALBU, BILIDIR, IBILI, BILITOT, ALKPHOS, ALT, AST in the last 72 hours. No results for input(s): VANCOTROUGH in the last 72 hours. No results found for: CRP  Lab Results   Component Value Date    SEDRATE 8 10/18/2019       No results for input(s): PROCAL in the last 72 hours. Imaging Studies:   ONE XRAY VIEW OF THE CHEST 7/3/2020 5:04 am  Impression   Lungs clear. CT OF THE ABDOMEN AND PELVIS WITHOUT CONTRAST 7/2/2020 5:45 pm  Impression   No acute intra-abdominal abnormality identified.  Resolution of the   hydronephrosis previously present on the left.  Tiny bilateral nonobstructing   renal calculi present.           Cultures:     Culture, Blood 1 [[id number]] Collected: 06/30/20 1945   Order Status: Completed Specimen: Blood Updated: 07/03/20 0711    Specimen Description . BLOOD    Special Requests NOT REPORTED    Culture NO GROWTH 3 DAYS   Culture, Blood 1 [[id number]] Collected: 06/30/20 1945   Order Status: Completed Specimen: Blood Updated: 07/03/20 0711    Specimen Description . BLOOD    Special Requests RT HAND, 10 ML    Culture NO GROWTH 3 DAYS   Culture, Respiratory [[id number]] Collected: 07/02/20 0230   Order Status: Completed Specimen: Tracheal Aspirate Updated: 07/02/20 1118    Specimen Description . TRACHEAL ASPIRATE    Special Requests NOT REPORTED    Direct Exam < 10 EPITHELIAL CELLS/LPF     <10 NEUTROPHILS/LPF     NO ORGANISMS SEEN    Culture PENDING     Medications:      piperacillin-tazobactam  3.375 g Intravenous Q8H    sodium chloride flush  10 mL Intravenous 2 times per day    famotidine  20 mg Oral Daily    enoxaparin  40 mg Subcutaneous Daily    riluzole  50 mg Oral Q12H           [facility]  Aleida Loja MD  [employer]  OFFICE: [phone]      Electronically signed by Shawanda Rios MD on 7/3/2020 at 11:07 AM  Thank you for allowing us to participate in the care of this patient. Please call with questions. This note iscreated with the assistance of a speech recognition program.  While intending to generate a document that actually reflects the content of the visit, the document can still have some errors including those of syntax andsound a like substitutions which may escape proof reading. In such instances, actual meaning can be extrapolated by contextual diversion.

## 2020-07-03 NOTE — PROGRESS NOTES
Patient reluctant to be positioned totally on one side or the other. Patient has deep tissue injury to coccygeal area. Writer has encouraged patient to stay off back as much as possible to prevent further injury to the area.

## 2020-07-03 NOTE — PROGRESS NOTES
Pulmonary Critical Care Progress Note  Cortney Chan MD     Patient seen for the follow up of acute on chronic hypercarbic respiratory failure, ALS,   Subjective:  He is sitting up in the bed. He wants to go home. No definite fever. Patient remains on Zosyn. He is tolerating orals. Examination:  Vitals: /82   Pulse 65   Temp 98.8 °F (37.1 °C) (Oral)   Resp 18   Ht 5' 10\" (1.778 m)   Wt 134 lb (60.8 kg)   SpO2 96%   BMI 19.23 kg/m²   General appearance:  alert and cooperative with exam  Neck: No JVD  Lungs: Moderate air exchange no wheezing  Heart: regular rate and rhythm, S1, S2 normal, no gallop  Abdomen: Soft, non tender, + BS  Extremities: no cyanosis or clubbing. No significant edema    LABs:  CBC:   Recent Labs     07/02/20  0311 07/03/20  0409   WBC 6.4 6.3   HGB 10.5* 10.7*   HCT 31.6* 33.0*   * 149     BMP:   Recent Labs     07/02/20  0311 07/03/20  0409    140   K 3.6* 3.7   CO2 24 22   BUN 10 11   CREATININE 0.53* 0.59*   LABGLOM >60 >60   GLUCOSE 89 90       Lab Results   Component Value Date    POCPH 7.47 07/02/2020    POCPCO2 29 07/02/2020    POCPO2 78 07/02/2020    POCHCO3 21.2 07/02/2020    NBEA 2 07/02/2020    PBEA NOT REPORTED 07/02/2020    YFF1LEO 22 07/02/2020    LRVG9BOX 97 07/02/2020    FIO2 28.0 07/02/2020     Radiology:  7/2/2020           Impression:  · Acute on chronic hypercapnic respiratory failure  · ALS  · Remote history of smoking  · Aspiration risk  · Fever, source ? LLL infiltrate    Recommendations:  · Continue trilogy support with tracheostomy, increased trach collar trials. · Continue respiratory rate at 10 and tidal volume at 500 ml  · Urine analysis, blood and sputum cultures so far negative  · On IV Zosyn, ID input noted, discharge planning after ID recommendations.   · Continue Rilutek  · Albuterol and Ipratropium Q 4 hours prn  · DVT prophylaxis with low molecular weight heparin  · Discussed with patient   · Discussed with RN and RT  · OK for discharge planning from pulmonary stand point with follow up with pulmonary in 2-3 weeks. Plan was discussed with patient and he understands it.     Liliana Galo MD, [facility]  Pulmonary Critical Care and Sleep Medicine,  [facility]  Cell: [phone]  Office: [phone]

## 2020-07-03 NOTE — PROGRESS NOTES
Mom at bedside. Writer assisted with packing up all patient belongings. Tracheostomy supplies given to a patient including 4 - #6 Shileys , drain sponges, Normal Saline, Peroxide and tracheostomy cleaning kits.

## 2020-07-03 NOTE — DISCHARGE SUMMARY
chronic hypoxemic respiratory failure due to that patient have developed progressive weakness dysarthria/aphonia  As you know patient has history of amyotrophic lateral sclerosis and tracheostomy, patient was mechanically ventilated dated 6/26/2022 to 6/28/2020, patient was seen by pulmonology, as patient was not coming off of ventilator, plan was done to have a tracheostomy which was performed on 6/26/2020, patient during the hospitalization was also checked for COVID 19 which was negative, patient over the course did well, but after his extubation patient started developing some fever, for this reason he was started on IV Zosyn for short period of time, infectious disease was involved there was some concern about cellulitis and infiltrate but his fever resolved and cultures remain negative for this reason his antibiotics were discontinued,As patient feeling better, patient is discharged to home with home health care for further rehabilitation, patient was told to have a close follow-up with the consultants seen in the hospital discharge from the hospital in stable condition          Patient was discharged without any antibiotics. ID was following. Received Zosyn for few days in the hospital.  Blood cultures were negative. The plan was discussed in detail with patient who agreed with the plan and verbalized understanding . The patient was seen and examined on day of discharge and this discharge summary is in conjunction with any daily progress note from day of discharge.     Hospital Data:    Labs:    Hematology:  Recent Labs     07/01/20 0417 07/02/20 0311 07/03/20 0409   WBC 9.0 6.4 6.3   RBC 3.69* 3.31* 3.44*   HGB 11.8* 10.5* 10.7*   HCT 35.5* 31.6* 33.0*   MCV 96.2 95.5 95.9   MCH 32.0 31.7 31.1   MCHC 33.2 33.2 32.4   RDW 12.8 13.0 13.0   * 127* 149   MPV 10.3 10.0 9.6     Chemistry:  Recent Labs     07/01/20 0417 07/02/20 0311 07/03/20  0409    140 140   K 3.6* 3.6* 3.7    105 104   CO2 25 24 22   GLUCOSE 102* 89 90   BUN 9 10 11   CREATININE 0.55* 0.53* 0.59*   ANIONGAP 13 11 14   LABGLOM >60 >60 >60   GFRAA >60 >60 >60   CALCIUM 9.1 8.9 8.9     No results for input(s): PROT, LABALBU, LABA1C, F3LRQAA, Z6PVIYS, FT4, TSH, AST, ALT, LDH, GGT, ALKPHOS, LABGGT, BILITOT, BILIDIR, AMMONIA, AMYLASE, LIPASE, LACTATE, CHOL, HDL, LDLCHOLESTEROL, CHOLHDLRATIO, TRIG, VLDL, NFU76KZ, PHENYTOIN, PHENYF, URICACID, POCGLU in the last 72 hours. Lab Results   Component Value Date    INR 1.0 10/18/2019    PROTIME 10.1 10/18/2019     Lab Results   Component Value Date/Time    SPECIAL NOT REPORTED 07/02/2020 02:30 AM     Lab Results   Component Value Date/Time    CULTURE NORMAL RESPIRATORY OBDULIO SCANT GROWTH 07/02/2020 02:30 AM       Lab Results   Component Value Date    POCPH 7.47 07/02/2020    POCPCO2 29 07/02/2020    POCPO2 78 07/02/2020    POCHCO3 21.2 07/02/2020    NBEA 2 07/02/2020    PBEA NOT REPORTED 07/02/2020    AMI4TDK 22 07/02/2020    BSIW9WUO 97 07/02/2020    FIO2 28.0 07/02/2020       Radiology:    Ct Abdomen Pelvis Wo Contrast Additional Contrast? None    Result Date: 7/2/2020  No acute intra-abdominal abnormality identified. Resolution of the hydronephrosis previously present on the left. Tiny bilateral nonobstructing renal calculi present. Xr Chest Portable    Result Date: 7/3/2020  Lungs clear. Xr Chest Portable    Result Date: 7/2/2020  Improved aeration at the left lung base, likely representing resolving atelectasis. Continued subcutaneous emphysema over the right clavicle, slightly decreased. Xr Chest Portable    Result Date: 7/1/2020  Left retrocardiac opacity may represent atelectasis. Developing consolidation should be considered in the appropriate clinical setting. Unchanged appearance of small amount of subcutaneous emphysema. No evidence for pneumothorax. Xr Chest Portable    Result Date: 6/30/2020  Stable chest including subcutaneous emphysema.      Xr Chest Portable    Result Date: 2020  Stable tracheostomy. No acute cardiopulmonary disease. Redemonstration of subcutaneous emphysema in the right lower neck. Xr Chest Portable    Result Date: 2020  No definite acute cardiopulmonary process. Subcutaneous emphysema in the right supraclavicular region. Xr Chest Portable    Result Date: 2020  1. No acute cardiopulmonary disease. 2. Stable tracheostomy. 3. Nonspecific subcutaneous gas within the right lower neck soft tissues. Clinical correlation is advised. Xr Chest Portable    Result Date: 2020  No acute cardiopulmonary disease. Tracheostomy in appropriate position. Fl Modified Barium Swallow W Video    Result Date: 2020  Flash laryngeal penetration of thin liquid consistency. Otherwise, no laryngeal penetration or tracheal aspiration of the remaining tested consistencies. Extended mastication. Please see separate speech pathology report for full discussion of findings and recommendations.          All radiological studies reviewed      Reviews of Symptoms:    A 10 point system is reviewed and  negative except described in hospital course    Physical Exam:    Vitals:  /82   Pulse 65   Temp 99.9 °F (37.7 °C) (Oral)   Resp 18   Ht 5' 10\" (1.778 m)   Wt 134 lb (60.8 kg)   SpO2 96%   BMI 19.23 kg/m²   Temp (24hrs), Av °F (37.2 °C), Min:98 °F (36.7 °C), Max:100.2 °F (37.9 °C)      General appearance - alert, well appearing, and in no acute distress  Mental status - oriented to person, place, and time with normal affect  Head - normocephalic and atraumatic  Eyes - pupils equal and reactive, extraocular eye movements intact, conjunctiva clear  Ears - hearing appears to be intact  Nose - no drainage noted  Mouth - mucous membranes moist  Neck - supple, no carotid bruits, thyroid not palpable  Chest - clear to auscultation, normal effort  Heart - normal rate, regular rhythm, no murmur  Abdomen - soft, nontender, nondistended, bowel sounds present all four quadrants, no masses, hepatomegaly or splenomegaly  Neurological - normal speech, no focal findings or movement disorder noted, cranial nerves II through XII grossly intact  Extremities - peripheral pulses palpable, no pedal edema or calf pain with palpation  Skin - no gross lesions, rashes, or induration noted      Consults:  IP CONSULT TO OTOLARYNGOLOGY  IP CONSULT TO PULMONOLOGY  IP CONSULT TO INFECTIOUS DISEASES    Disposition: Home    Discharged Condition: Stable    Follow Up: [facility]  [address]  [address] [place]  [address]  [address] [facility]          [place]  [phone]      Call them regarding nutrition supplement    [employer]  phone [phone] extension 374 MD Jordan Rodriguez [address] [facility]    Call in 2 days  Post hospitalization    MD Ritu Munoz. [facility] [address] [place] [address]  [phone]    Schedule an appointment as soon as possible for a visit in 2 weeks  Post hospitalization       Lab Frequency Next Occurrence   Up as tolerated PRN    Intake and output EVERY 8 HOURS    Basic Metabolic Panel w/ Reflex to MG DAILY    CBC DAILY    Incentive spirometry RT EVERY 2 HOURS WHILE AWAKE    Spontaneous Breathing Trial (SBT) DAILY (RT)    Post Extubation Oxygen Therapy DAILY (RT)    Manual Mechanical Ventilation CONTINUOUS WITH Q4H RT CHECKS    Initiate Oxygen Therapy Protocol DAILY (RT)    Capnography CONTINUOUS WITH Q4H RT CHECKS    HHN Treatment EVERY 4 HOURS PRN          Diet: DIET DENTAL SOFT; Dental Soft  Dietary Nutrition Supplements: Standard High Calorie Oral Supplement    Discharge Medications:    Ayah Patches   Home Medication Instructions [id number]    Printed on:07/03/20 3096   Medication Information                      riluzole (RILUTEK) 50 MG tablet  Take 50 mg by mouth

## 2020-07-06 LAB
CULTURE: NORMAL
CULTURE: NORMAL
Lab: NORMAL
Lab: NORMAL
SPECIMEN DESCRIPTION: NORMAL
SPECIMEN DESCRIPTION: NORMAL

## 2020-07-09 ENCOUNTER — HOSPITAL ENCOUNTER (INPATIENT)
Age: 55
LOS: 10 days | Discharge: HOME HEALTH | DRG: 870 | End: 2020-07-19
Attending: EMERGENCY MEDICINE | Admitting: FAMILY MEDICINE
Payer: MEDICARE

## 2020-07-09 ENCOUNTER — APPOINTMENT (OUTPATIENT)
Dept: CT IMAGING | Age: 55
DRG: 870 | End: 2020-07-09
Payer: MEDICARE

## 2020-07-09 ENCOUNTER — APPOINTMENT (OUTPATIENT)
Dept: GENERAL RADIOLOGY | Age: 55
DRG: 870 | End: 2020-07-09
Payer: MEDICARE

## 2020-07-09 PROBLEM — J95.851 VENTILATOR ASSOCIATED PNEUMONIA (HCC): Status: ACTIVE | Noted: 2020-07-09

## 2020-07-09 LAB
ABSOLUTE EOS #: 0.2 K/UL (ref 0–0.4)
ABSOLUTE IMMATURE GRANULOCYTE: 0.2 K/UL (ref 0–0.3)
ABSOLUTE LYMPH #: 0.4 K/UL (ref 1–4.8)
ABSOLUTE MONO #: 1.21 K/UL (ref 0.2–0.8)
ANION GAP SERPL CALCULATED.3IONS-SCNC: 19 MMOL/L (ref 9–17)
BASOPHILS # BLD: 1 %
BASOPHILS ABSOLUTE: 0.2 K/UL (ref 0–0.2)
BUN BLDV-MCNC: 19 MG/DL (ref 6–20)
BUN/CREAT BLD: 28 (ref 9–20)
CALCIUM SERPL-MCNC: 9.5 MG/DL (ref 8.6–10.4)
CHLORIDE BLD-SCNC: 100 MMOL/L (ref 98–107)
CO2: 20 MMOL/L (ref 20–31)
CREAT SERPL-MCNC: 0.68 MG/DL (ref 0.7–1.2)
DIFFERENTIAL TYPE: ABNORMAL
EKG ATRIAL RATE: 129 BPM
EKG P-R INTERVAL: 120 MS
EKG Q-T INTERVAL: 400 MS
EKG QRS DURATION: 84 MS
EKG QTC CALCULATION (BAZETT): 586 MS
EKG R AXIS: -23 DEGREES
EKG T AXIS: 64 DEGREES
EKG VENTRICULAR RATE: 129 BPM
EOSINOPHILS RELATIVE PERCENT: 1 % (ref 1–4)
FIO2: 60
GFR AFRICAN AMERICAN: >60 ML/MIN
GFR NON-AFRICAN AMERICAN: >60 ML/MIN
GFR SERPL CREATININE-BSD FRML MDRD: ABNORMAL ML/MIN/{1.73_M2}
GFR SERPL CREATININE-BSD FRML MDRD: ABNORMAL ML/MIN/{1.73_M2}
GLUCOSE BLD-MCNC: 168 MG/DL (ref 70–99)
HCT VFR BLD CALC: 41.7 % (ref 40.7–50.3)
HEMOGLOBIN: 13.7 G/DL (ref 13–17)
IMMATURE GRANULOCYTES: 1 %
LACTIC ACID, SEPSIS WHOLE BLOOD: ABNORMAL MMOL/L (ref 0.5–1.9)
LACTIC ACID, SEPSIS WHOLE BLOOD: ABNORMAL MMOL/L (ref 0.5–1.9)
LACTIC ACID, SEPSIS: 2.1 MMOL/L (ref 0.5–1.9)
LACTIC ACID, SEPSIS: 3.3 MMOL/L (ref 0.5–1.9)
LYMPHOCYTES # BLD: 2 % (ref 24–44)
MCH RBC QN AUTO: 32.3 PG (ref 25.2–33.5)
MCHC RBC AUTO-ENTMCNC: 32.9 G/DL (ref 28.4–34.8)
MCV RBC AUTO: 98.3 FL (ref 82.6–102.9)
MONOCYTES # BLD: 6 % (ref 1–7)
MYOGLOBIN: 78 NG/ML (ref 28–72)
NEGATIVE BASE EXCESS, ART: 4 (ref 0–2)
NRBC AUTOMATED: 0 PER 100 WBC
O2 DEVICE/FLOW/%: ABNORMAL
PATIENT TEMP: 37
PDW BLD-RTO: 13.3 % (ref 11.8–14.4)
PLATELET # BLD: 248 K/UL (ref 138–453)
PLATELET ESTIMATE: ABNORMAL
PMV BLD AUTO: 9.1 FL (ref 8.1–13.5)
POC HCO3: 23.2 MMOL/L (ref 22–27)
POC O2 SATURATION: 97 %
POC PCO2 TEMP: ABNORMAL MM HG
POC PCO2: 51 MM HG (ref 32–45)
POC PH TEMP: ABNORMAL
POC PH: 7.27 (ref 7.35–7.45)
POC PO2 TEMP: ABNORMAL MM HG
POC PO2: 104 MM HG (ref 75–95)
POSITIVE BASE EXCESS, ART: ABNORMAL (ref 0–2)
POTASSIUM SERPL-SCNC: 4.2 MMOL/L (ref 3.7–5.3)
RBC # BLD: 4.24 M/UL (ref 4.21–5.77)
RBC # BLD: ABNORMAL 10*6/UL
SEG NEUTROPHILS: 89 % (ref 36–66)
SEGMENTED NEUTROPHILS ABSOLUTE COUNT: 17.99 K/UL (ref 1.8–7.7)
SODIUM BLD-SCNC: 139 MMOL/L (ref 135–144)
TCO2 (CALC), ART: 25 MMOL/L (ref 23–28)
TROPONIN INTERP: ABNORMAL
TROPONIN T: ABNORMAL NG/ML
TROPONIN, HIGH SENSITIVITY: 150 NG/L (ref 0–22)
WBC # BLD: 20.2 K/UL (ref 3.5–11.3)
WBC # BLD: ABNORMAL 10*3/UL

## 2020-07-09 PROCEDURE — 6360000002 HC RX W HCPCS: Performed by: EMERGENCY MEDICINE

## 2020-07-09 PROCEDURE — 94761 N-INVAS EAR/PLS OXIMETRY MLT: CPT

## 2020-07-09 PROCEDURE — 71045 X-RAY EXAM CHEST 1 VIEW: CPT

## 2020-07-09 PROCEDURE — 2580000003 HC RX 258: Performed by: EMERGENCY MEDICINE

## 2020-07-09 PROCEDURE — 6360000002 HC RX W HCPCS: Performed by: FAMILY MEDICINE

## 2020-07-09 PROCEDURE — 93005 ELECTROCARDIOGRAM TRACING: CPT | Performed by: NURSE PRACTITIONER

## 2020-07-09 PROCEDURE — 94640 AIRWAY INHALATION TREATMENT: CPT

## 2020-07-09 PROCEDURE — 85025 COMPLETE CBC W/AUTO DIFF WBC: CPT

## 2020-07-09 PROCEDURE — 83874 ASSAY OF MYOGLOBIN: CPT

## 2020-07-09 PROCEDURE — 93010 ELECTROCARDIOGRAM REPORT: CPT | Performed by: INTERNAL MEDICINE

## 2020-07-09 PROCEDURE — 94002 VENT MGMT INPAT INIT DAY: CPT

## 2020-07-09 PROCEDURE — 2000000000 HC ICU R&B

## 2020-07-09 PROCEDURE — 82803 BLOOD GASES ANY COMBINATION: CPT

## 2020-07-09 PROCEDURE — 96375 TX/PRO/DX INJ NEW DRUG ADDON: CPT

## 2020-07-09 PROCEDURE — 6360000004 HC RX CONTRAST MEDICATION: Performed by: EMERGENCY MEDICINE

## 2020-07-09 PROCEDURE — 71260 CT THORAX DX C+: CPT

## 2020-07-09 PROCEDURE — 5A1955Z RESPIRATORY VENTILATION, GREATER THAN 96 CONSECUTIVE HOURS: ICD-10-PCS | Performed by: INTERNAL MEDICINE

## 2020-07-09 PROCEDURE — 2700000000 HC OXYGEN THERAPY PER DAY

## 2020-07-09 PROCEDURE — 2580000003 HC RX 258: Performed by: FAMILY MEDICINE

## 2020-07-09 PROCEDURE — 80048 BASIC METABOLIC PNL TOTAL CA: CPT

## 2020-07-09 PROCEDURE — 99285 EMERGENCY DEPT VISIT HI MDM: CPT

## 2020-07-09 PROCEDURE — 2580000003 HC RX 258: Performed by: NURSE PRACTITIONER

## 2020-07-09 PROCEDURE — 84484 ASSAY OF TROPONIN QUANT: CPT

## 2020-07-09 PROCEDURE — 87040 BLOOD CULTURE FOR BACTERIA: CPT

## 2020-07-09 PROCEDURE — 96365 THER/PROPH/DIAG IV INF INIT: CPT

## 2020-07-09 PROCEDURE — 6370000000 HC RX 637 (ALT 250 FOR IP): Performed by: FAMILY MEDICINE

## 2020-07-09 PROCEDURE — 83605 ASSAY OF LACTIC ACID: CPT

## 2020-07-09 PROCEDURE — 6360000002 HC RX W HCPCS: Performed by: NURSE PRACTITIONER

## 2020-07-09 RX ORDER — 0.9 % SODIUM CHLORIDE 0.9 %
80 INTRAVENOUS SOLUTION INTRAVENOUS ONCE
Status: COMPLETED | OUTPATIENT
Start: 2020-07-09 | End: 2020-07-09

## 2020-07-09 RX ORDER — SODIUM CHLORIDE 9 MG/ML
INJECTION, SOLUTION INTRAVENOUS CONTINUOUS
Status: DISCONTINUED | OUTPATIENT
Start: 2020-07-09 | End: 2020-07-19 | Stop reason: HOSPADM

## 2020-07-09 RX ORDER — IPRATROPIUM BROMIDE AND ALBUTEROL SULFATE 2.5; .5 MG/3ML; MG/3ML
1 SOLUTION RESPIRATORY (INHALATION)
Status: DISCONTINUED | OUTPATIENT
Start: 2020-07-09 | End: 2020-07-18

## 2020-07-09 RX ORDER — ACETAMINOPHEN 325 MG/1
650 TABLET ORAL EVERY 6 HOURS PRN
Status: DISCONTINUED | OUTPATIENT
Start: 2020-07-09 | End: 2020-07-19 | Stop reason: HOSPADM

## 2020-07-09 RX ORDER — SODIUM CHLORIDE 0.9 % (FLUSH) 0.9 %
10 SYRINGE (ML) INJECTION PRN
Status: DISCONTINUED | OUTPATIENT
Start: 2020-07-09 | End: 2020-07-19 | Stop reason: HOSPADM

## 2020-07-09 RX ORDER — LORAZEPAM 2 MG/ML
1 INJECTION INTRAMUSCULAR ONCE
Status: COMPLETED | OUTPATIENT
Start: 2020-07-09 | End: 2020-07-09

## 2020-07-09 RX ORDER — 0.9 % SODIUM CHLORIDE 0.9 %
1600 INTRAVENOUS SOLUTION INTRAVENOUS ONCE
Status: COMPLETED | OUTPATIENT
Start: 2020-07-09 | End: 2020-07-09

## 2020-07-09 RX ORDER — ACETAMINOPHEN 650 MG/1
650 SUPPOSITORY RECTAL EVERY 6 HOURS PRN
Status: DISCONTINUED | OUTPATIENT
Start: 2020-07-09 | End: 2020-07-19 | Stop reason: HOSPADM

## 2020-07-09 RX ORDER — SODIUM CHLORIDE 0.9 % (FLUSH) 0.9 %
10 SYRINGE (ML) INJECTION EVERY 12 HOURS SCHEDULED
Status: DISCONTINUED | OUTPATIENT
Start: 2020-07-09 | End: 2020-07-19 | Stop reason: HOSPADM

## 2020-07-09 RX ORDER — FENTANYL CITRATE 50 UG/ML
50 INJECTION, SOLUTION INTRAMUSCULAR; INTRAVENOUS ONCE
Status: COMPLETED | OUTPATIENT
Start: 2020-07-09 | End: 2020-07-09

## 2020-07-09 RX ORDER — SODIUM CHLORIDE 0.9 % (FLUSH) 0.9 %
10 SYRINGE (ML) INJECTION PRN
Status: DISCONTINUED | OUTPATIENT
Start: 2020-07-09 | End: 2020-07-09 | Stop reason: SDUPTHER

## 2020-07-09 RX ORDER — PROMETHAZINE HYDROCHLORIDE 12.5 MG/1
12.5 TABLET ORAL EVERY 6 HOURS PRN
Status: DISCONTINUED | OUTPATIENT
Start: 2020-07-09 | End: 2020-07-19 | Stop reason: HOSPADM

## 2020-07-09 RX ORDER — ONDANSETRON 2 MG/ML
4 INJECTION INTRAMUSCULAR; INTRAVENOUS EVERY 6 HOURS PRN
Status: DISCONTINUED | OUTPATIENT
Start: 2020-07-09 | End: 2020-07-19 | Stop reason: HOSPADM

## 2020-07-09 RX ORDER — ALBUTEROL SULFATE 2.5 MG/3ML
2.5 SOLUTION RESPIRATORY (INHALATION)
Status: DISCONTINUED | OUTPATIENT
Start: 2020-07-09 | End: 2020-07-18

## 2020-07-09 RX ADMIN — SODIUM CHLORIDE: 9 INJECTION, SOLUTION INTRAVENOUS at 15:49

## 2020-07-09 RX ADMIN — FENTANYL CITRATE 50 MCG: 50 INJECTION, SOLUTION INTRAMUSCULAR; INTRAVENOUS at 13:59

## 2020-07-09 RX ADMIN — IPRATROPIUM BROMIDE AND ALBUTEROL SULFATE 1 AMPULE: .5; 3 SOLUTION RESPIRATORY (INHALATION) at 19:29

## 2020-07-09 RX ADMIN — SODIUM CHLORIDE 80 ML: 0.9 INJECTION, SOLUTION INTRAVENOUS at 12:51

## 2020-07-09 RX ADMIN — PIPERACILLIN SODIUM AND TAZOBACTAM SODIUM 4.5 G: 4; .5 INJECTION, POWDER, LYOPHILIZED, FOR SOLUTION INTRAVENOUS at 12:59

## 2020-07-09 RX ADMIN — IOPAMIDOL 75 ML: 755 INJECTION, SOLUTION INTRAVENOUS at 12:52

## 2020-07-09 RX ADMIN — LORAZEPAM 1 MG: 2 INJECTION, SOLUTION INTRAMUSCULAR; INTRAVENOUS at 13:00

## 2020-07-09 RX ADMIN — SODIUM CHLORIDE 1600 ML: 9 INJECTION, SOLUTION INTRAVENOUS at 12:53

## 2020-07-09 RX ADMIN — IPRATROPIUM BROMIDE AND ALBUTEROL SULFATE 1 AMPULE: .5; 3 SOLUTION RESPIRATORY (INHALATION) at 16:01

## 2020-07-09 RX ADMIN — ENOXAPARIN SODIUM 40 MG: 40 INJECTION SUBCUTANEOUS at 15:53

## 2020-07-09 RX ADMIN — VANCOMYCIN HYDROCHLORIDE 1500 MG: 1.5 INJECTION, POWDER, LYOPHILIZED, FOR SOLUTION INTRAVENOUS at 13:59

## 2020-07-09 RX ADMIN — SODIUM CHLORIDE, PRESERVATIVE FREE 10 ML: 5 INJECTION INTRAVENOUS at 12:52

## 2020-07-09 RX ADMIN — PIPERACILLIN AND TAZOBACTAM 3.38 G: 3; .375 INJECTION, POWDER, LYOPHILIZED, FOR SOLUTION INTRAVENOUS at 17:53

## 2020-07-09 ASSESSMENT — ENCOUNTER SYMPTOMS
DIARRHEA: 0
WHEEZING: 0
SORE THROAT: 0
SINUS PRESSURE: 0
CONSTIPATION: 0
COUGH: 0
NAUSEA: 0
RHINORRHEA: 0
ABDOMINAL PAIN: 0
VOMITING: 0
COLOR CHANGE: 0
SHORTNESS OF BREATH: 1

## 2020-07-09 ASSESSMENT — PULMONARY FUNCTION TESTS
PIF_VALUE: 23
PIF_VALUE: 20
PIF_VALUE: 26

## 2020-07-09 ASSESSMENT — PAIN SCALES - GENERAL
PAINLEVEL_OUTOF10: 0
PAINLEVEL_OUTOF10: 6

## 2020-07-09 NOTE — ED NOTES
Pt presents to ed c/o shortness of breath since last night. He has h/o ALS. He has a tracheostomy in place which he received two weeks ago. Lungs diminished throughout. He is tachy on monitor.   He was diagnosed with pneumonia last Friday when he was here in the hospital.       Rachel Muniz RN  07/09/20 9916

## 2020-07-09 NOTE — CONSULTS
Pulmonary Medicine and [address] MD      Patient - Narcisa Almeida   MRN -  [id number]   Jewel # - [de-identified]   - 1965      Date of Admission -  2020 11:34 AM  Date of evaluation -  2020  Room - [address] [place] [address]   Carole Raymond MD Primary Care Physician - Shabnam Warren MD     Reason for Consult    Pneumonia    Assessment   · Acute on chronic hypoxic and hypercarbic respiratory failure  · LLL pneumonia  · Aspiration  · ALS s/p recent trach  · ~10-pack-year smoking, during his 25s    Recommendations   · Continue IV antibiotics, zoysyn, vancomycin  · IV fluids  · Swallow study  · Albuterol and Ipratropium Q 4 hours and prn  · Patient may need bronch for mucous plugging  · X-ray chest in am  · Labs: CBC and BMP in am  · Vent support  · Trach care  · DVT prophylaxis with low molecular weight heparin  · Will follow with you    HPI     Narcisa Almeida is 53 y.o.,  male, admitted because of increase in shortness of breath, fever and cough. He has increase tracheal secretion with green yellow color. His dyspnea got worse over the last few days. He denies chest pain. He denies hemoptysis. He had fever, but no chill and felt warm. He denies nasal congestion. He has recent trach on 20. Since then he has been on home vent.   PMHx   Past Medical History      Diagnosis Date    [facility]otrophic lateral sclerosis (ALS) ([facility] [address]     Back pain     on 10/18/19 pt denies pain mgmnt    Degenerative disc disease       Past Surgical History        Procedure Laterality Date    APPENDECTOMY      COLONOSCOPY  2017    CYSTOSCOPY  10/05/2016    LITHOTRIPSY Right 10/19/2016    LUMBAR FUSION  2018    LUMBAR INTERBODY FUSION POSTERIOR L3-4    [place] LUMBAR SPINE FUSN,POST INTRBDY N/A 2018    LUMBAR INTERBODY FUSION POSTERIOR L3-4,  ([address] - OFFICE TO NOTIFY, ROTATING AARON TABLE, C-ARM) performed by Clay Gibbons MD at [address] [place] [address] TRACHEOSTOMY N/A 2020    TRACHEOSTOMY PERCUTANEOUS performed by Mandi Lombardo MD at [address] [facility]    Current Medications    sodium chloride flush  10 mL Intravenous 2 times per day    enoxaparin  40 mg Subcutaneous Daily    ipratropium-albuterol  1 ampule Inhalation Q4H WA    piperacillin-tazobactam  3.375 g Intravenous Q8H     sodium chloride flush, acetaminophen **OR** acetaminophen, magnesium hydroxide, promethazine **OR** ondansetron, albuterol  IV Drips/Infusions   sodium chloride       Home Medications  Medications Prior to Admission: riluzole (RILUTEK) 50 MG tablet, Take 50 mg by mouth every 12 hours     Allergies    Sulfa antibiotics  Social History     Social History     Tobacco Use    Smoking status: Former Smoker     Years: 5.00     Types: Cigarettes     Last attempt to quit:      Years since quittin.5    Smokeless tobacco: Never Used   Substance Use Topics    Alcohol use: Not Currently     Comment: very seldom     Family History          Problem Relation Age of Onset    High Blood Pressure Mother     Diabetes Mother     Heart Disease Mother      ROS - 11 systems   General Denies any fever or chills  HEENT Denies any diplopia, tinnitus or vertigo  Resp positive for  cough with sputum and dyspnea  Cardiac Denies any chest pain, palpitations, claudication or edema  GI Denies any melena, hematochezia, hematemesis or pyrosis   Denies any frequency, urgency, hesitancy or incontinence  Heme Denies bruising or bleeding easily  Endocrine Denies any history of diabetes or thyroid disease  Neuro Denies any focal motor or sensory deficits  Psychiatric Denies anxiety, depression, suicidal ideation  Skin Denies rashes, itching, open sores    Vitals     height is 5' 10\" (1.778 m) and weight is 124 lb 5 oz (56.4 kg). His oral temperature is 98.3 °F (36.8 °C). His blood pressure is 129/84 and his pulse is 100. His respiration is 24 and oxygen saturation is 95%.    Body mass index is 17.84 kg/m². I/O    No intake or output data in the 24 hours ending 07/09/20 1547  No intake/output data recorded. Patient Vitals for the past 96 hrs (Last 3 readings):   Weight   07/09/20 1120 124 lb 5 oz (56.4 kg)     Exam   General Appearance  Awake, alert, oriented, in no acute distress  HEENT - Head is normocephalic, atraumatic. Pupil reactive to light  Neck - Supple, symmetrical, trachea midline and Soft, trachea midline and straight  Lungs - Left basilar crackles no wheezing  Cardiovascular - Heart sounds are normal.  Regular rhythm normal rate without murmur, gallop or rub. Abdomen - Soft, nontender, nondistended, no masses or organomegaly  Neurologic - CN II-XII are grossly intact.  He has neuromuscular weakness secondary to ALS  Skin - No bruising or bleeding  Extremities - No cyanosis, clubbing or edema    Labs  - Old records and notes have been reviewed in [facility]   CBC     Lab Results   Component Value Date    WBC 20.2 07/09/2020    RBC 4.24 07/09/2020    HGB 13.7 07/09/2020    HCT 41.7 07/09/2020     07/09/2020    MCV 98.3 07/09/2020    MCH 32.3 07/09/2020    MCHC 32.9 07/09/2020    RDW 13.3 07/09/2020    LYMPHOPCT 2 07/09/2020    MONOPCT 6 07/09/2020    BASOPCT 1 07/09/2020    MONOSABS 1.21 07/09/2020    LYMPHSABS 0.40 07/09/2020    EOSABS 0.20 07/09/2020    BASOSABS 0.20 07/09/2020    DIFFTYPE NOT REPORTED 07/09/2020     BMP   Lab Results   Component Value Date     07/09/2020    K 4.2 07/09/2020     07/09/2020    CO2 20 07/09/2020    BUN 19 07/09/2020    CREATININE 0.68 07/09/2020    GLUCOSE 168 07/09/2020    CALCIUM 9.5 07/09/2020    MG 2.2 10/24/2019     LFTS  Lab Results   Component Value Date    ALKPHOS 52 06/07/2020    ALT 47 06/07/2020    AST 35 06/07/2020    PROT 6.9 06/07/2020    BILITOT 0.73 06/07/2020    BILIDIR 0.24 06/07/2020    IBILI 0.49 06/07/2020    LABALBU 4.6 06/07/2020     ABG   Lab Results   Component Value Date    TVQ8DVC 22 07/02/2020     PTT  Lab Results Component Value Date    APTT 22.9 (L) 10/20/2019     INR   Lab Results   Component Value Date    INR 1.0 10/18/2019    PROTIME 10.1 10/18/2019       Radiology    CT Scans    (See actual reports for details)    \"Thank you for asking us to see this patient\"    Case discussed with nurse and patient/family. Questions and concerns addressed.     Electronically signed by     Lana Horowitz MD on 7/9/2020 at 3:47 PM  Pulmonary Critical Care and Sleep Medicine,  [facility]  Cell: [phone]  Office: [phone]

## 2020-07-09 NOTE — H&P
History & Physical  [facility].,    Adult Hospitalist      Name: Bernadette Fitzgerald  MRN: [id number]     [place]: [de-identified]  Room: STA20/20    Admit Date: 7/9/2020 11:34 AM  PCP: Prisca Ramos MD    Primary Problem  Active Problems:    Ventilator associated pneumonia ([address]  Resolved Problems:    * No resolved hospital problems. *        Assesment:     · Ventilator associated pneumonia   · Amyotrophic lateral sclerosis   · Recent tracheostomy  · Chronic respiratory failure   · DJD Lumbar spine   · Chronic back pain    · Nephrolithiasis   · Malnourishment   · Contact dermatitis         Plan:     · Admit to ICU  · Monitor vitals closely  · Keep SpO2 above 90%  · Mechanical ventilation through tracheostomy   · Is/ Os  · Daily weights   · RT eval  · Duo Neb prn  · IV fluids- NS at 75ml/h  · IV Antibiotics  · IV Vanco  · Pharmacy to dose  · IV Zosyn  · Anti emetics prn  · CXR in am  · Pulmonology consulted in ER  · NPO for now- advance to Dental soft if tolerates  · Lovenox for Px  · D/w RN  · DVT and GI prophylaxis. Chief Complaint:     Chief Complaint   Patient presents with    Shortness of Breath     yesterday         History of Present Illness:      Bernadette Fitzgerald is a 53 y.o.  male who presents with Shortness of Breath (yesterday)    Pt brought to the ER by his wife with c/o progressive cough and respiratory distress since yesterday evening. Pt was seen in the office by me yesterday and had progressed adequately since discharge several days ago after undergoing a tracheostomy and placed on mechanical ventilation for acute on chronic respiratory failure. Pt had occasional dry cough. Per report by wife cough became congested and she had suctioned green thick phlegm at home. Pt says he had felt febrile and had chills intermittently. Pt says he has not had appetite for a day also. Says he has had progressively more dyspnea since yesterday evening.      Denies any headache, orthopnea, vomiting, joint pain, pedal edema, palpitations. Pt has had a rash over his upper back which has been itching intermittently. Pt denies using any new detergent, deodorant or any exposure to new plant or chemical. They do have a pet puppy at home recently. I have personally reviewed the past medical history, past surgical history, medications, social history, and family history, and summarized in the note. Review of Systems:     All 10 point system is reviewed and negative otherwise mentioned in HPI. Past Medical History:     Past Medical History:   Diagnosis Date    Amyotrophic lateral sclerosis (ALS) ([address]     Back pain     on 10/18/19 pt denies pain mgmnt    Degenerative disc disease         Past Surgical History:     Past Surgical History:   Procedure Laterality Date    APPENDECTOMY      COLONOSCOPY  2017    CYSTOSCOPY  10/05/2016    LITHOTRIPSY Right 10/19/2016    LUMBAR FUSION  05/30/2018    LUMBAR INTERBODY FUSION POSTERIOR L3-4    [place] LUMBAR SPINE FUSN,POST INTRBDY N/A 5/30/2018    LUMBAR INTERBODY FUSION POSTERIOR L3-4,  ([address] - OFFICE TO NOTIFY, ROTATING AARON TABLE, C-ARM) performed by Mike Jules MD at [address] [facility]"" 6/26/2020    TRACHEOSTOMY PERCUTANEOUS performed by Mali Barraza MD at [address] [place] [address]        Medications Prior to Admission:       Prior to Admission medications    Medication Sig Start Date End Date Taking? Authorizing Provider   riluzole (RILUTEK) 50 MG tablet Take 50 mg by mouth every 12 hours  5/19/20   Historical Provider, MD        Allergies:       Sulfa antibiotics    Social History:     Tobacco:    reports that he quit smoking about 31 years ago. His smoking use included cigarettes. He quit after 5.00 years of use. He has never used smokeless tobacco.  Alcohol:      reports previous alcohol use. Drug Use:  reports no history of drug use.     Family History:     Family History   Problem Relation Age of Onset    High Blood Pressure Mother    [facility] Diabetes Mother     Heart Disease Mother          Physical Exam:     Vitals:  /85   Pulse 132   Temp 99 °F (37.2 °C)   Resp 29   Ht 5' 10\" (1.778 m)   Wt 124 lb 5 oz (56.4 kg)   SpO2 95%   BMI 17.84 kg/m²   Temp (24hrs), Av °F (37.2 °C), Min:99 °F (37.2 °C), Max:99 °F (37.2 °C)          General appearance - alert, well appearing, and in no acute distress  Mental status - oriented to person, place, and time with normal affect  Head - normocephalic and atraumatic  Eyes - pupils equal and reactive, extraocular eye movements intact, conjunctiva clear  Ears - hearing appears to be intact  Nose - no drainage noted  Mouth - mucous membranes moist  Neck - supple, no carotid bruits, thyroid not palpable  Chest - coarse crackles bilateral on auscultation, increased effort  Heart - tachycardia, regular rhythm, no murmur  Abdomen - soft, nontender, nondistended, bowel sounds present all four quadrants, no masses, hepatomegaly or splenomegaly  Neurological - normal speech, no focal findings or movement disorder noted, cranial nerves II through XII grossly intact  Extremities - peripheral pulses palpable, no pedal edema or calf pain with palpation  Skin - no gross lesions, rashes, or induration noted        Data:     Labs:    Hematology:  Recent Labs     20  1125   WBC 20.2*   RBC 4.24   HGB 13.7   HCT 41.7   MCV 98.3   MCH 32.3   MCHC 32.9   RDW 13.3      MPV 9.1     Chemistry:  Recent Labs     20  1125      K 4.2      CO2 20   GLUCOSE 168*   BUN 19   CREATININE 0.68*   ANIONGAP 19*   LABGLOM >60   GFRAA >60   CALCIUM 9.5   TROPHS 150*   MYOGLOBIN 78*     No results for input(s): PROT, LABALBU, LABA1C, C2SHCRA, G5TYUBX, FT4, TSH, AST, ALT, LDH, GGT, ALKPHOS, LABGGT, BILITOT, BILIDIR, AMMONIA, AMYLASE, LIPASE, LACTATE, CHOL, HDL, LDLCHOLESTEROL, CHOLHDLRATIO, TRIG, VLDL, YTK77DQ, PHENYTOIN, PHENYF, URICACID, POCGLU in the last 72 hours.     Lab Results   Component Value Date    INR examination and  completion of the assessment.

## 2020-07-09 NOTE — ED PROVIDER NOTES
EMERGENCY DEPARTMENT ENCOUNTER   ATTENDING ATTESTATION     Pt Name: Bernadette Fitzgerald  MRN: [id number]  [place] 1965  Date of evaluation: 7/9/20   Bernadette Fitzgerald is a 53 y.o. male with CC: Shortness of Breath (yesterday)    MDM:     26-year-old male coming in the emergency room with worsening shortness of breath, dependent due to ALS. Patient has history of pneumonia. patient is tachycardic and tachypneic. Given elevated white blood cell count and lactic patient meets criteria for sepsis likely related to hospital/ventilator acquired pneumonia. Patient be started on vancomycin and Zosyn here in the emergency room. Sepsis protocol initiated. Vitals:    07/09/20 1119 07/09/20 1120 07/09/20 1207   BP: 126/85     Pulse: 132     Resp: 20  29   Temp: 99 °F (37.2 °C)     SpO2: (!) 83%  95%   Weight:  124 lb 5 oz (56.4 kg)    Height:  5' 10\" (1.778 m)      Recent Results (from the past 24 hour(s))   CBC Auto Differential    Collection Time: 07/09/20 11:25 AM   Result Value Ref Range    WBC 20.2 (H) 3.5 - 11.3 k/uL       1)  Sepsis Identified at Time:   12:40      2)  Sepsis Alert Protocol Guidelines:  · Blood Cultures drawn before antibiotics  · Broad Spectrum Antibiotics given stat within one hour  · Lactic Acid Q2 hours times 2 occurrences (if first lactic acid > 2.0)    3)  Fluid Bolus Guidelines:    If lactate > 4.0   OR   MAP less than 65  OR  systolic BP < 90 (two separate readings),            then 30ml/kg crystalloid fluid bolus infused, and may give over 4 hour duration. Is the patient Morbidly Obese (BMI > 30):    No    Body mass index is 17.84 kg/m².   Ideal body weight: 73 kg (160 lb 15 oz)    If Morbidly Obese the Ideal Body  (Pinson formula):   Ideal body weight (IBW) (men) = 50 kg + 2.3 kg x (height, inches - 60)    Ideal body weight (IBW) (women) = 45.5 kg + 2.3 kg x (height, inches - 60)    4)  Repeat Sepsis Exam completed during fluid bolus at time:                 CRITICAL CARE:       EKG: All EKG's are interpreted by the Emergency Department Physician who either signs or Co-signs this chart in the absence of a cardiologist.    EKG- sinus tacycardia with rate 129  Nonspecific ST depression in V4-V6  QTc 586  RADIOLOGY:All plain film, CT, MRI, and formal ultrasound images (except ED bedside ultrasound) are read by the radiologist, see reports below, unless otherwise noted in MDM or here. XR CHEST PORTABLE    (Results Pending)   CT Chest Pulmonary Embolism W Contrast    (Results Pending)     LABS: All lab results were reviewed by myself, and all abnormals are listed below. Labs Reviewed   CBC WITH AUTO DIFFERENTIAL - Abnormal; Notable for the following components:       Result Value    WBC 20.2 (*)     Seg Neutrophils 89 (*)     Lymphocytes 2 (*)     Immature Granulocytes 1 (*)     Segs Absolute 17.99 (*)     Absolute Lymph # 0.40 (*)     Absolute Mono # 1.21 (*)     All other components within normal limits   BASIC METABOLIC PANEL - Abnormal; Notable for the following components:    Glucose 168 (*)     CREATININE 0.68 (*)     Bun/Cre Ratio 28 (*)     Anion Gap 19 (*)     All other components within normal limits   LACTATE, SEPSIS - Abnormal; Notable for the following components:    Lactic Acid, Sepsis 3.3 (*)     All other components within normal limits   TROP/MYOGLOBIN - Abnormal; Notable for the following components:    Troponin, High Sensitivity 150 (*)     Myoglobin 78 (*)     All other components within normal limits   CULTURE, BLOOD 1   CULTURE, BLOOD 1   LACTATE, SEPSIS     CONSULTS:  None  FINAL IMPRESSION    No diagnosis found.         PASTMEDICAL HISTORY     Past Medical History:   Diagnosis Date    Amyotrophic lateral sclerosis (ALS) ([address]     Back pain     on 10/18/19 pt denies pain mgmnt    Degenerative disc disease      SURGICAL HISTORY       Past Surgical History:   Procedure Laterality Date    APPENDECTOMY      COLONOSCOPY  2017    CYSTOSCOPY  10/05/2016    LITHOTRIPSY Right 10/19/2016    LUMBAR FUSION  2018    LUMBAR INTERBODY FUSION POSTERIOR L3-4    DC LUMBAR SPINE FUSN,POST INTRBDY N/A 2018    LUMBAR INTERBODY FUSION POSTERIOR L3-4,  ([address] - OFFICE TO NOTIFY, ROTATING AARON TABLE, C-ARM) performed by Halle Perla MD at [facility] 2020    TRACHEOSTOMY PERCUTANEOUS performed by Mac Roberts MD at [facility]       Previous Medications    RILUZOLE (RILUTEK) 50 MG TABLET    Take 50 mg by mouth every 12 hours      ALLERGIES     is allergic to sulfa antibiotics. FAMILY HISTORY     He indicated that his mother is alive. SOCIAL HISTORY       Social History     Tobacco Use    Smoking status: Former Smoker     Years: 5.00     Types: Cigarettes     Last attempt to quit:      Years since quittin.5    Smokeless tobacco: Never Used   Substance Use Topics    Alcohol use: Not Currently     Comment: very seldom    Drug use: No       I personally evaluated and examined the patient in conjunction with the APC and agree with the assessment, treatment plan, and disposition of the patient as recorded by the APC.    Cesia Vega MD  Attending Emergency Physician        French Keen MD  [address], [place]  20 [address]

## 2020-07-09 NOTE — ED PROVIDER NOTES
achievable. COMPARISON: 06/07/2020 HISTORY: ORDERING SYSTEM PROVIDED HISTORY: STONE PROTOCOL TECHNOLOGIST PROVIDED HISTORY: STONE PROTOCOL Reason for Exam: F/U renal stone seen on prior CT dated 6/7. Hx lumbar fusion, appendectomy, and previous lithotripsy. Type of Exam: Subsequent/Follow-up FINDINGS: Lower Chest: Heart size is normal.  Small amount of opacity present in the base of the left lower lobe either atelectasis or pneumonia. No pleural effusion. Organs: Liver is normal in appearance without evident focal lesion on the noncontrast images. A few tiny nonobstructing calculi are present bilaterally in each kidney. No hydronephrosis present. The other abdominal organs appear normal. GI/Bowel: Large amount of ingested food within the stomach. Appendix is surgically absent. Normal amount of stool. No acute GI abnormality. Pelvis: Prostate is mildly enlarged. Urinary bladder appears normal.  No free fluid or adenopathy. Peritoneum/Retroperitoneum: No mass, adenopathy, or ascites. Normal aorta. Bones/Soft Tissues: No acute abnormality. Prior lumbar surgery. Intact spinal hardware. No acute intra-abdominal abnormality identified. Resolution of the hydronephrosis previously present on the left. Tiny bilateral nonobstructing renal calculi present. Xr Chest Portable    Result Date: 7/9/2020  EXAMINATION: ONE XRAY VIEW OF THE CHEST 7/9/2020 11:37 am COMPARISON: 07/03/2020 HISTORY: ORDERING SYSTEM PROVIDED HISTORY: Chest Pain TECHNOLOGIST PROVIDED HISTORY: Chest Pain Acuity: Acute Type of Exam: Unknown Initial evaluation, acute chest pain, nontraumatic FINDINGS: The cardiomediastinal silhouette is normal in size. Tracheostomy is stable in position. There is focal consolidation at the left lung base, concerning for pneumonia or aspiration. This is new from previous exam. The right lung is clear. No pleural effusion or pneumothorax. No pleural effusion or pneumothorax is present.      Acute airspace disease at the left lung base concerning for pneumonia or aspiration. Radiographic follow-up is recommended. Xr Chest Portable    Result Date: 7/3/2020  EXAMINATION: ONE XRAY VIEW OF THE CHEST 7/3/2020 5:04 am COMPARISON: 07/02/2020 HISTORY: ORDERING SYSTEM PROVIDED HISTORY: vent TECHNOLOGIST PROVIDED HISTORY: vent Reason for Exam: infiltrate Acuity: Unknown Type of Exam: Unknown FINDINGS: Tracheostomy tube in place. Normal cardiomediastinal silhouette. No focal consolidation. No pleural effusion or pneumothorax. Bones grossly intact. Lungs clear. Xr Chest Portable    Result Date: 7/2/2020  EXAMINATION: ONE XRAY VIEW OF THE CHEST 7/2/2020 5:34 am COMPARISON: 1 July 2020 HISTORY: ORDERING SYSTEM PROVIDED HISTORY: vent TECHNOLOGIST PROVIDED HISTORY: vent Reason for Exam: infiltrate Acuity: Unknown Type of Exam: Unknown FINDINGS: AP portable view of the chest time stamped at 456 hours demonstrates overlying cardiac monitoring electrodes. Efren ostomy tube is noted in situ. Strand-like opacity is present at the left base improved, likely resolving atelectasis. No appreciable extrapleural air. Subcutaneous emphysema persists over the right clavicle. No mediastinal shift. Heart size stable. Improved aeration at the left lung base, likely representing resolving atelectasis. Continued subcutaneous emphysema over the right clavicle, slightly decreased. Xr Chest Portable    Result Date: 7/1/2020  EXAMINATION: ONE XRAY VIEW OF THE CHEST 7/1/2020 4:54 am COMPARISON: 06/30/2020, 06/29/2020 HISTORY: ORDERING SYSTEM PROVIDED HISTORY: vent TECHNOLOGIST PROVIDED HISTORY: vent Reason for Exam: daily vent care FINDINGS: Left retrocardiac opacity appears new. No pneumothorax, evidence for edema or significant effusion. The cardiac and mediastinal contours appear unchanged. Tracheostomy tube in place. Small amount of subcutaneous gas projects in the right neck as before.      Left retrocardiac opacity may represent atelectasis. Developing consolidation should be considered in the appropriate clinical setting. Unchanged appearance of small amount of subcutaneous emphysema. No evidence for pneumothorax. Xr Chest Portable    Result Date: 6/30/2020  EXAMINATION: ONE XRAY VIEW OF THE CHEST 6/30/2020 5:02 am COMPARISON: June 29, 2020 HISTORY: ORDERING SYSTEM PROVIDED HISTORY: vent TECHNOLOGIST PROVIDED HISTORY: vent Reason for Exam: daily vent care    trached Respiratory failure FINDINGS: Stable tracheostomy. Stable cardiomediastinal contours. No effusion, pneumothorax, or airspace consolidation. Subcutaneous emphysema near the right supraclavicular region. Please correlate with any recent or prior percutaneous intervention at this site. Stable chest including subcutaneous emphysema. Xr Chest Portable    Result Date: 6/29/2020  EXAMINATION: ONE X-RAY VIEW OF THE CHEST 6/29/2020 6:31 am COMPARISON: June 28, 2020 HISTORY: ORDERING SYSTEM PROVIDED HISTORY: Vent TECHNOLOGIST PROVIDED HISTORY: Vent Reason for Exam: Vent Acuity: Unknown Type of Exam: Unknown FINDINGS: Stable lines and tubes including tracheostomy. Stable cardiomediastinal silhouette. The pulmonary system demonstrates no focal consolidation or pleural effusion. No pulmonary edema. No new osseous abnormality. Small amount of subcutaneous gas is seen in the right lower neck. Stable tracheostomy. No acute cardiopulmonary disease. Redemonstration of subcutaneous emphysema in the right lower neck. Xr Chest Portable    Result Date: 6/28/2020  EXAMINATION: ONE XRAY VIEW OF THE CHEST 6/28/2020 6:21 am COMPARISON: Chest radiograph performed 06/27/2020. HISTORY: ORDERING SYSTEM PROVIDED HISTORY: vent TECHNOLOGIST PROVIDED HISTORY: vent Reason for Exam: vent Acuity: Unknown Type of Exam: Unknown FINDINGS: There is no acute consolidation or effusion. There is no pneumothorax. The mediastinal structures are unremarkable.   There is a tracheostomy tube. The upper abdomen is unremarkable. There is a small amount of subcutaneous emphysema within the right supraclavicular region. No definite acute cardiopulmonary process. Subcutaneous emphysema in the right supraclavicular region. Xr Chest Portable    Result Date: 6/27/2020  EXAMINATION: ONE XRAY VIEW OF THE CHEST 6/27/2020 4:23 am COMPARISON: 06/26/2020, 10/21/2019 HISTORY: ORDERING SYSTEM PROVIDED HISTORY: vent TECHNOLOGIST PROVIDED HISTORY: vent Reason for Exam: daily vent care FINDINGS: A single frontal view of the chest was obtained. There is no acute skeletal abnormality. There is a stable tracheostomy in proper position. The heart size and mediastinal contours are stable, and within normal limits. The lungs are clear, without evidence of acute airspace consolidation, pneumothorax, or pleural effusion. There is nonspecific subcutaneous gas within the right lower neck soft tissues. 1. No acute cardiopulmonary disease. 2. Stable tracheostomy. 3. Nonspecific subcutaneous gas within the right lower neck soft tissues. Clinical correlation is advised. Xr Chest Portable    Result Date: 6/26/2020  EXAMINATION: ONE XRAY VIEW OF THE CHEST 6/26/2020 4:26 pm COMPARISON: 06/26/2020. HISTORY: ORDERING SYSTEM PROVIDED HISTORY: s/p trach TECHNOLOGIST PROVIDED HISTORY: s/p trach Reason for Exam: S/p trach placement Acuity: Unknown Type of Exam: Ongoing FINDINGS: The cardiomediastinal silhouette is unremarkable. The lungs are clear. No infiltrate, pleural fluid or pneumothorax. Tracheostomy appliance is in place. No acute cardiopulmonary disease. Tracheostomy in appropriate position. Xr Chest Portable    Result Date: 6/26/2020  EXAMINATION: ONE XRAY VIEW OF THE CHEST 6/26/2020 6:08 am COMPARISON: 10/21/2019 HISTORY: Reason for Exam: AECOPD Relevant Medical/Surgical History: ALS FINDINGS: The lungs are without acute focal process. No effusion or pneumothorax.  The cardiomediastinal silhouette is normal.  The osseous structures are intact without acute process. Negative portable chest.     Ct Chest Pulmonary Embolism W Contrast    Result Date: 7/9/2020  EXAMINATION: CTA OF THE CHEST 7/9/2020 12:51 pm TECHNIQUE: CTA of the chest was performed after the administration of intravenous contrast.  Multiplanar reformatted images are provided for review. MIP images are provided for review. Dose modulation, iterative reconstruction, and/or weight based adjustment of the mA/kV was utilized to reduce the radiation dose to as low as reasonably achievable. COMPARISON: Chest radiograph 07/03/2020. Chest CT 10/18/2019. HISTORY: ORDERING SYSTEM PROVIDED HISTORY: Chest Pain r/o PE TECHNOLOGIST PROVIDED HISTORY: Chest Pain r/o PE Reason for Exam: Pt presents to ed c/o shortness of breath since last night. He has h/o ALS. He has a tracheostomy in place which he received two weeks ago. Lungs diminished throughout. He is tachy on monitor. He was diagnosed with pneumonia last Friday when he was here in the hospital. FINDINGS: Pulmonary Arteries: Pulmonary arteries are adequately opacified for evaluation. No evidence of intraluminal filling defect to suggest pulmonary embolism. Main pulmonary artery is normal in caliber. Mediastinum: Tracheostomy tube in place. No evidence of mediastinal lymphadenopathy. The heart and pericardium demonstrate no acute abnormality. There is no acute abnormality of the thoracic aorta. Lungs/pleura: Debris is present within the distal mainstem bronchus and predominantly extending into the left mainstem bronchus and left lower lobe segmental bronchi with associated left lower lobe consolidative opacity. Consolidative opacity in the inferior lingula is also noted to a lesser degree. There is plugging also noted in the posterior basal segment of the right lower lobe without associated airspace disease. No effusion. No pneumothorax identified.  Upper Abdomen: No acute findings. Soft Tissues/Bones: No acute bone or soft tissue abnormality. 1.  Debris in the trachea predominantly extends into the left mainstem bronchus with associated consolidative opacity in the left lower lobe and inferior lingula, suggestive of aspiration pneumonitis. There is also debris in the right lower lobe segmental bronchi without associated airspace disease. 2.  No evidence for acute pulmonary embolism. Fl Modified Barium Swallow W Video    Result Date: 6/29/2020  EXAMINATION: MODIFIED BARIUM SWALLOW WAS PERFORMED IN CONJUNCTION WITH SPEECH PATHOLOGY SERVICES TECHNIQUE: Fluoroscopic evaluation of the swallowing mechanism was performed with multiple consistency of barium product. FLUOROSCOPY DOSE AND TYPE OR TIME AND EXPOSURES: 2.4 minutes 25.0 mGy 10 images/cine clips COMPARISON: None HISTORY: ORDERING SYSTEM PROVIDED HISTORY: swallow study post trach placement TECHNOLOGIST PROVIDED HISTORY: swallow study post trach placement Reason for Exam: Recent ALS dx, eval for swallowing difficulties Acuity: Acute Type of Exam: Subsequent/Follow-up FINDINGS: Extended oral phase of swallowing with all consistencies. No laryngeal penetration or tracheal aspiration of nectar thick liquid, pudding, soft solid, regular solid consistencies. Flash laryngeal penetration of thin liquid. Flash laryngeal penetration of thin liquid consistency. Otherwise, no laryngeal penetration or tracheal aspiration of the remaining tested consistencies. Extended mastication. Please see separate speech pathology report for full discussion of findings and recommendations. Interpretation per the Radiologist below, if available at the time of this note:    CT Chest Pulmonary Embolism W Contrast   Final Result   1.   Debris in the trachea predominantly extends into the left mainstem   bronchus with associated consolidative opacity in the left lower lobe and   inferior lingula, suggestive of aspiration pneumonitis. There is also debris   in the right lower lobe segmental bronchi without associated airspace disease. 2.  No evidence for acute pulmonary embolism. XR CHEST PORTABLE   Final Result   Acute airspace disease at the left lung base concerning for pneumonia or   aspiration. Radiographic follow-up is recommended. XR CHEST STANDARD (2 VW)    (Results Pending)           LABS:  Labs Reviewed   CBC WITH AUTO DIFFERENTIAL - Abnormal; Notable for the following components:       Result Value    WBC 20.2 (*)     Seg Neutrophils 89 (*)     Lymphocytes 2 (*)     Immature Granulocytes 1 (*)     Segs Absolute 17.99 (*)     Absolute Lymph # 0.40 (*)     Absolute Mono # 1.21 (*)     All other components within normal limits   BASIC METABOLIC PANEL - Abnormal; Notable for the following components:    Glucose 168 (*)     CREATININE 0.68 (*)     Bun/Cre Ratio 28 (*)     Anion Gap 19 (*)     All other components within normal limits   LACTATE, SEPSIS - Abnormal; Notable for the following components:    Lactic Acid, Sepsis 3.3 (*)     All other components within normal limits   LACTATE, SEPSIS - Abnormal; Notable for the following components:    Lactic Acid, Sepsis 2.1 (*)     All other components within normal limits   TROP/MYOGLOBIN - Abnormal; Notable for the following components:    Troponin, High Sensitivity 150 (*)     Myoglobin 78 (*)     All other components within normal limits   CULTURE, BLOOD 1   CULTURE, BLOOD 1   GRAM STAIN   CULTURE, RESPIRATORY       All other labs were within normal range or not returned as of this dictation.     EMERGENCY DEPARTMENT COURSE and DIFFERENTIAL DIAGNOSIS/MDM:   Vitals:    Vitals:    07/09/20 1350 07/09/20 1405 07/09/20 1435 07/09/20 1520   BP: 119/83 99/77 124/86 129/84   Pulse: 100 98 99 100   Resp: 18 15 23 24   Temp:    98.3 °F (36.8 °C)   TempSrc:    Oral   SpO2: 98% 98% 95%    Weight:       Height:           Medical Decision Making: With abscess was called at 1252. He had had blood cultures and lactate drawn. He was started on broad-spectrum antibiotics. Dr. Dewayne Cole came down to the emergency room for evaluation of this patient. I spoke and discussed this case with Dr. Clifford Zamora who accepts patient    CONSULTS:  PHARMACY TO DOSE VANCOMYCIN  IP CONSULT TO INTERNAL MEDICINE  IP CONSULT TO PULMONOLOGY  IP CONSULT TO PHARMACY  IP CONSULT TO PULMONOLOGY  CRITICAL CARE TIME     Due to the high probability of sudden and clinically significant deterioration in the patient's condition he required highest level of my preparedness to intervene urgently. I provided critical care time including documentation time, medication orders and management, reevaluation, vital sign assessment, ordering and reviewing of of lab tests ordering and reviewing of x-ray studies, and admission orders. Aggregate critical care time is 30 minutes including only time during which I was engaged in work directly related to his care and did not include time spent treating other patients simultaneously. Vitals:    07/09/20 1350 07/09/20 1405 07/09/20 1435 07/09/20 1520   BP: 119/83 99/77 124/86 129/84   Pulse: 100 98 99 100   Resp: 18 15 23 24   Temp:    98.3 °F (36.8 °C)   TempSrc:    Oral   SpO2: 98% 98% 95%    Weight:       Height:         A repeat sepsis focused exam has been completed 3:35 PM EDT. FINAL IMPRESSION      1.  Pneumonia due to organism          DISPOSITION/PLAN   DISPOSITION Admitted 07/09/2020 01:19:36 PM      PATIENT REFERRED TO:   Mary Kate Robert [place]  [id number] [address]  [phone]            DISCHARGE MEDICATIONS:     Current Discharge Medication List              (Please note that portions of this note were completed with a voice recognition program.  Efforts were made to edit the dictations but occasionally words are mis-transcribed.)    Jack Raymond NP, APRN - CNP  Certified Nurse Practitioner          DECLAN Cain CNP  07/09/20 9967

## 2020-07-09 NOTE — FLOWSHEET NOTE
Writer went over admission questions with wife. She states the patient's code status was changed to a DNR when he was diagnosed. Paperwork was signed with a  and a copy given to Dr. Priya Alvares. Copy requested. Writer confirmed with patient. He wants no compressions, no shocks, mo meds if his heart stops. He nods head in agreement. DI Sethi confirms. Dr. Priya Alvares notified. Orders changed.  DNR-CCA

## 2020-07-10 ENCOUNTER — APPOINTMENT (OUTPATIENT)
Dept: GENERAL RADIOLOGY | Age: 55
DRG: 870 | End: 2020-07-10
Payer: MEDICARE

## 2020-07-10 LAB
ABSOLUTE EOS #: 0 K/UL (ref 0–0.44)
ABSOLUTE IMMATURE GRANULOCYTE: 0.15 K/UL (ref 0–0.3)
ABSOLUTE LYMPH #: 0.77 K/UL (ref 1.1–3.7)
ABSOLUTE MONO #: 0.92 K/UL (ref 0.1–1.2)
ANION GAP SERPL CALCULATED.3IONS-SCNC: 10 MMOL/L (ref 9–17)
BASOPHILS # BLD: 1 % (ref 0–2)
BASOPHILS ABSOLUTE: 0.15 K/UL (ref 0–0.2)
BUN BLDV-MCNC: 15 MG/DL (ref 6–20)
BUN/CREAT BLD: 31 (ref 9–20)
CALCIUM SERPL-MCNC: 9 MG/DL (ref 8.6–10.4)
CHLORIDE BLD-SCNC: 105 MMOL/L (ref 98–107)
CO2: 25 MMOL/L (ref 20–31)
CREAT SERPL-MCNC: 0.49 MG/DL (ref 0.7–1.2)
DIFFERENTIAL TYPE: ABNORMAL
EOSINOPHILS RELATIVE PERCENT: 0 % (ref 1–4)
FIO2: NORMAL
GFR AFRICAN AMERICAN: >60 ML/MIN
GFR NON-AFRICAN AMERICAN: >60 ML/MIN
GFR SERPL CREATININE-BSD FRML MDRD: ABNORMAL ML/MIN/{1.73_M2}
GFR SERPL CREATININE-BSD FRML MDRD: ABNORMAL ML/MIN/{1.73_M2}
GLUCOSE BLD-MCNC: 143 MG/DL (ref 70–99)
HCT VFR BLD CALC: 34.2 % (ref 40.7–50.3)
HEMOGLOBIN: 11.1 G/DL (ref 13–17)
IMMATURE GRANULOCYTES: 1 %
LYMPHOCYTES # BLD: 5 % (ref 24–43)
MCH RBC QN AUTO: 31.8 PG (ref 25.2–33.5)
MCHC RBC AUTO-ENTMCNC: 32.5 G/DL (ref 28.4–34.8)
MCV RBC AUTO: 98 FL (ref 82.6–102.9)
MONOCYTES # BLD: 6 % (ref 3–12)
MORPHOLOGY: ABNORMAL
MORPHOLOGY: ABNORMAL
NEGATIVE BASE EXCESS, ART: NORMAL (ref 0–2)
NRBC AUTOMATED: 0 PER 100 WBC
O2 DEVICE/FLOW/%: NORMAL
PATIENT TEMP: NORMAL
PDW BLD-RTO: 13.5 % (ref 11.8–14.4)
PLATELET # BLD: 164 K/UL (ref 138–453)
PLATELET ESTIMATE: ABNORMAL
PMV BLD AUTO: 9.6 FL (ref 8.1–13.5)
POC HCO3: 25.7 MMOL/L (ref 22–27)
POC O2 SATURATION: 97 %
POC PCO2 TEMP: NORMAL MM HG
POC PCO2: 43 MM HG (ref 32–45)
POC PH TEMP: NORMAL
POC PH: 7.39 (ref 7.35–7.45)
POC PO2 TEMP: NORMAL MM HG
POC PO2: 92 MM HG (ref 75–95)
POSITIVE BASE EXCESS, ART: 1 (ref 0–2)
POTASSIUM SERPL-SCNC: 4.8 MMOL/L (ref 3.7–5.3)
RBC # BLD: 3.49 M/UL (ref 4.21–5.77)
RBC # BLD: ABNORMAL 10*6/UL
SARS-COV-2, PCR: NORMAL
SARS-COV-2, RAPID: NOT DETECTED
SARS-COV-2: NORMAL
SEG NEUTROPHILS: 87 % (ref 36–65)
SEGMENTED NEUTROPHILS ABSOLUTE COUNT: 13.31 K/UL (ref 1.5–8.1)
SODIUM BLD-SCNC: 140 MMOL/L (ref 135–144)
SOURCE: NORMAL
TCO2 (CALC), ART: 27 MMOL/L (ref 23–28)
WBC # BLD: 15.3 K/UL (ref 3.5–11.3)
WBC # BLD: ABNORMAL 10*3/UL

## 2020-07-10 PROCEDURE — 71045 X-RAY EXAM CHEST 1 VIEW: CPT

## 2020-07-10 PROCEDURE — 94003 VENT MGMT INPAT SUBQ DAY: CPT

## 2020-07-10 PROCEDURE — U0002 COVID-19 LAB TEST NON-CDC: HCPCS

## 2020-07-10 PROCEDURE — 85025 COMPLETE CBC W/AUTO DIFF WBC: CPT

## 2020-07-10 PROCEDURE — 74230 X-RAY XM SWLNG FUNCJ C+: CPT

## 2020-07-10 PROCEDURE — 6370000000 HC RX 637 (ALT 250 FOR IP): Performed by: FAMILY MEDICINE

## 2020-07-10 PROCEDURE — 80048 BASIC METABOLIC PNL TOTAL CA: CPT

## 2020-07-10 PROCEDURE — 2000000000 HC ICU R&B

## 2020-07-10 PROCEDURE — 6360000002 HC RX W HCPCS: Performed by: FAMILY MEDICINE

## 2020-07-10 PROCEDURE — 94640 AIRWAY INHALATION TREATMENT: CPT

## 2020-07-10 PROCEDURE — 82803 BLOOD GASES ANY COMBINATION: CPT

## 2020-07-10 PROCEDURE — 36415 COLL VENOUS BLD VENIPUNCTURE: CPT

## 2020-07-10 PROCEDURE — 2580000003 HC RX 258: Performed by: FAMILY MEDICINE

## 2020-07-10 PROCEDURE — 36600 WITHDRAWAL OF ARTERIAL BLOOD: CPT

## 2020-07-10 PROCEDURE — 2700000000 HC OXYGEN THERAPY PER DAY

## 2020-07-10 PROCEDURE — 94761 N-INVAS EAR/PLS OXIMETRY MLT: CPT

## 2020-07-10 PROCEDURE — 92611 MOTION FLUOROSCOPY/SWALLOW: CPT

## 2020-07-10 RX ORDER — MORPHINE SULFATE 4 MG/ML
4 INJECTION, SOLUTION INTRAMUSCULAR; INTRAVENOUS EVERY 4 HOURS PRN
Status: DISCONTINUED | OUTPATIENT
Start: 2020-07-10 | End: 2020-07-19 | Stop reason: HOSPADM

## 2020-07-10 RX ORDER — HYDROCODONE BITARTRATE AND ACETAMINOPHEN 5; 325 MG/1; MG/1
1 TABLET ORAL EVERY 4 HOURS PRN
Status: DISCONTINUED | OUTPATIENT
Start: 2020-07-10 | End: 2020-07-19 | Stop reason: HOSPADM

## 2020-07-10 RX ADMIN — VANCOMYCIN HYDROCHLORIDE 1000 MG: 1 INJECTION, POWDER, LYOPHILIZED, FOR SOLUTION INTRAVENOUS at 03:11

## 2020-07-10 RX ADMIN — IPRATROPIUM BROMIDE AND ALBUTEROL SULFATE 1 AMPULE: .5; 3 SOLUTION RESPIRATORY (INHALATION) at 10:48

## 2020-07-10 RX ADMIN — VANCOMYCIN HYDROCHLORIDE 1000 MG: 1 INJECTION, POWDER, LYOPHILIZED, FOR SOLUTION INTRAVENOUS at 14:54

## 2020-07-10 RX ADMIN — IPRATROPIUM BROMIDE AND ALBUTEROL SULFATE 1 AMPULE: .5; 3 SOLUTION RESPIRATORY (INHALATION) at 16:03

## 2020-07-10 RX ADMIN — SODIUM CHLORIDE: 9 INJECTION, SOLUTION INTRAVENOUS at 00:03

## 2020-07-10 RX ADMIN — ENOXAPARIN SODIUM 40 MG: 40 INJECTION SUBCUTANEOUS at 10:10

## 2020-07-10 RX ADMIN — IPRATROPIUM BROMIDE AND ALBUTEROL SULFATE 1 AMPULE: .5; 3 SOLUTION RESPIRATORY (INHALATION) at 07:28

## 2020-07-10 RX ADMIN — PIPERACILLIN AND TAZOBACTAM 3.38 G: 3; .375 INJECTION, POWDER, LYOPHILIZED, FOR SOLUTION INTRAVENOUS at 10:11

## 2020-07-10 RX ADMIN — PIPERACILLIN AND TAZOBACTAM 3.38 G: 3; .375 INJECTION, POWDER, LYOPHILIZED, FOR SOLUTION INTRAVENOUS at 18:31

## 2020-07-10 RX ADMIN — PIPERACILLIN AND TAZOBACTAM 3.38 G: 3; .375 INJECTION, POWDER, LYOPHILIZED, FOR SOLUTION INTRAVENOUS at 03:50

## 2020-07-10 RX ADMIN — IPRATROPIUM BROMIDE AND ALBUTEROL SULFATE 1 AMPULE: .5; 3 SOLUTION RESPIRATORY (INHALATION) at 19:20

## 2020-07-10 ASSESSMENT — PULMONARY FUNCTION TESTS
PIF_VALUE: 13
PIF_VALUE: 23
PIF_VALUE: 18
PIF_VALUE: 24
PIF_VALUE: 19
PIF_VALUE: 13
PIF_VALUE: 19

## 2020-07-10 ASSESSMENT — PAIN SCALES - GENERAL: PAINLEVEL_OUTOF10: 0

## 2020-07-10 ASSESSMENT — PAIN - FUNCTIONAL ASSESSMENT: PAIN_FUNCTIONAL_ASSESSMENT: 0-10

## 2020-07-10 NOTE — PROGRESS NOTES
Pulmonary Critical Care Progress Note  Mynor Cope MD     Patient seen for the follow up of Acute on chronic hypoxic and hypercarbic respiratory failure, pneumonia, aspiration, ALS, remote history of smoking    Subjective:  Patient had uneventful night. He is still is feeling short of breath being on the ventilator. Positive tracheal secretions. He denies any chest pain. Examination:  Vitals: /73   Pulse 87   Temp 98.4 °F (36.9 °C) (Temporal)   Resp 25   Ht 5' 10\" (1.778 m)   Wt 129 lb 1.6 oz (58.6 kg)   SpO2 93%   BMI 18.52 kg/m²   General appearance:  alert and cooperative with exam  Neck: No JVD  Lungs: Basal crackles, moderate air exchange  Heart: regular rate and rhythm, S1, S2 normal, no gallop  Abdomen: Soft, non tender, + BS  Extremities: no cyanosis or clubbing. No significant edema    LABs:  CBC:   Recent Labs     07/09/20  1125 07/10/20  0430   WBC 20.2* 15.3*   HGB 13.7 11.1*   HCT 41.7 34.2*    164     BMP:   Recent Labs     07/09/20  1125 07/10/20  0430    140   K 4.2 4.8   CO2 20 25   BUN 19 15   CREATININE 0.68* 0.49*   LABGLOM >60 >60   GLUCOSE 168* 143*       Lab Results   Component Value Date    POCPH 7.39 07/10/2020    POCPCO2 43 07/10/2020    POCPO2 92 07/10/2020    POCHCO3 25.7 07/10/2020    NBEA NOT REPORTED 07/10/2020    PBEA 1 07/10/2020    EHX3LMG 27 07/10/2020    IOSC1JEA 97 07/10/2020    FIO2 NOT REPORTED 07/10/2020     Radiology:  7/10/2020      Impression:  · Acute on chronic hypoxic and hypercarbic respiratory failure  · LLL pneumonia  · Aspiration  · ALS s/p recent trach  · ~10-pack-year smoking, during his 20s    Recommendations:  · Continue IV antibiotics, zoysyn, vancomycin  · IV fluids  · Swallow study, This p.m.   · Albuterol and Ipratropium Q 4 hours and prn  · Patient may need bronch for mucous plugging  · X-ray chest in am  · Labs: CBC and BMP in am  · Vent support  · Trach care  · DVT prophylaxis with low molecular weight heparin  · Will follow with you  Geoffrey Roman MD, [facility]  Pulmonary Critical Care and Sleep Medicine,  [facility]  Cell: [phone]  Office: [phone]

## 2020-07-10 NOTE — PROCEDURES
INSTRUMENTAL SWALLOW REPORT  MODIFIED BARIUM SWALLOW    NAME: Raquel Billings   : 1965  MRN: [id number]       Date of Eval: 7/10/2020              Referring Diagnosis(es):      Past Medical History:  has a past medical history of Amyotrophic lateral sclerosis (ALS) ([facility] [address] Back pain, and Degenerative disc disease. Past Surgical History:  has a past surgical history that includes Appendectomy; Cystocopy (10/05/2016); Lithotripsy (Right, 10/19/2016); Colonoscopy (2017); lumbar fusion (2018); pr lumbar spine fusn,post intrbdy (N/A, 2018); and tracheostomy (N/A, 2020). Current Diet Solid Consistency: Regular  Current Diet Liquid Consistency: Thin       Type of Study: Repeat MBS       Recent CXR: (  7-  )    Impression   Small increased left lung base opacity is nonspecific and may represent   atelectasis with pneumonia or aspiration not excluded.               Patient Complaints/Reason for Referral:  Raquel Billings was referred for a MBS to assess the efficiency of his swallow function, assess for aspiration, and to make recommendations regarding safe dietary consistencies, effective compensatory strategies, and safe eating environment. Onset of problem: Per chart, Raquel Billings is a 53 y.o.  male who presents with Shortness of Breath (yesterday). Pt brought to the ER by his wife with c/o progressive cough and respiratory distress since yesterday evening. Pt was seen in the office by me yesterday and had progressed adequately since discharge several days ago after undergoing a tracheostomy and placed on mechanical ventilation for acute on chronic respiratory failure. Pt had occasional dry cough. Per report by wife cough became congested and she had suctioned green thick phlegm at home. Pt says he had felt febrile and had chills intermittently. Pt says he has not had appetite for a day also. Says he has had progressively more dyspnea since yesterday evening.  Denies any headache, orthopnea, vomiting, joint pain, pedal edema, palpitations. Pt has had a rash over his upper back which has been itching intermittently. Pt denies using any new detergent, deodorant or any exposure to new plant or chemical. They do have a pet puppy at home recently. I have personally reviewed the past medical history, past surgical history, medications, social history, and family history, and summarized in the note. Behavior/Cognition/Vision/Hearing:  Behavior/Cognition: Alert; Cooperative;Pleasant mood  Vision: Within Functional Limits  Hearing: Within functional limits     Shiley No. 6 Trach, cuff inflated. On vent. Respiratory Therapy and RN present for MBSS this date. Impressions:  Patient presents with probable safe swallow for Regular diet with thin liquids as evidenced by no aspiration observed with consistencies tested. Pt with +flash penetration of thin liquid by cup. Pt with +premature spillage to valleculae and +min residual in valleculae with all consistencies tested. Recommend small sips and bites, only feed when alert and awake and upright at 90 degrees for all PO intake and remain upright 30-45 minutes following PO intake. Recommend continued close monitoring for overt/clinical s/s of aspiration and D/C PO intake and repeat Modified Barium Swallow Study should they occur. Results and recommendations reported to RN. Patient Position: Lateral and Patient Degrees: 90    Consistencies Administered: Dysphagia Soft and Bite-Sized (Dysphagia III); Reg solid; Dysphagia Pureed (Dysphagia I); Thin cup;Nectar cup                   Dysphagia Outcome Severity Scale: Level 6: Within functional limits/Modified independence  Penetration-Aspiration Scale (PAS): 2 - Material enters the airway, remains above the vocal folds, and is ejected from the airway    Recommended Diet:  Solid consistency: Regular  Liquid consistency:  Thin     Medication administration: PO    Safe Swallow Protocol:  Supervision: Close  Compensatory Swallowing Strategies: Eat/Feed slowly; Small bites/sips;Upright as possible for all oral intake;Remain upright for 30-45 minutes after meals      Recommendations/Treatment  Requires SLP Intervention: Yes        D/C Recommendations: SNF       Recommended Exercises:    Therapeutic Interventions: Diet tolerance monitoring;Patient/Family education       Education: Images and recommendations were reviewed with pt & RN following this exam.   Patient Education: yes  Patient Education Response: Demonstrated understanding    Prognosis  Prognosis for safe diet advancement: guarded  Duration/Frequency of Treatment  Duration/Frequency of Treatment: 1-2x per week      Goals:    Long Term: To Maximize safety with intake, optimize nutrition/hydration and minimize risk for aspiration. Short Term:  Dysphagia Goals:  The patient will tolerate recommended diet without observed clinical signs of aspiration      Oral Preparation / Oral Phase  Oral Phase: WFL    Pharyngeal Phase  Pharyngeal Phase: WFL  Honey thick: NT  Puree: no penetration or aspiration; +premature spillage to vallecule; +min residual in valleculae  Soft solid: no penetration or aspiration; +premature spillage to vallecule; +min residual in valleculae  Reg solid: no penetration or aspiration; +premature spillage to vallecule; +min residual in valleculae  Nectar thick (spoon): no penetration or aspiration; +premature spillage to vallecule; +min residual in valleculae  Thin liquid (spoon): no penetration or aspiration; +premature spillage to vallecule; +min residual in valleculae  Thin liquid (cup): +flash penetration, no aspiration; +premature spillage to vallecule; +min residual in valleculae    Esophageal Phase  Esophageal Screen: WFL      Pain   Patient Currently in Pain: No  Pain Level: 0      Therapy Time:   Individual Concurrent Group Co-treatment   Time In 1430         Time Out 1440         Minutes 10 Isabella Wheatley M.S. [id number] [facility]   7/10/2020, 3:27 PM

## 2020-07-10 NOTE — CONSULTS
Pharmacy Note  Vancomycin Consult - Initial Note     Hans Madrigal is a 53 y.o. male ordered Vancomycin. .  Current diagnosis for which MRSA is suspected/confirmed: ventilator associated pneumonia  Vancomycin order/consult received from Dr. Vazquez Yousif . Additional antimicrobials:  Zosyn    Patient Active Problem List   Diagnosis    ureteral calculus ,right    Bradycardia    Renal calculus, right    Lumbar stenosis with neurogenic claudication    Respiratory failure (HCC)    Acute respiratory failure with hypercapnia (HCC)    Neuromuscular disorder (HCC)    Severe malnutrition (HCC)    Atelectasis    Tracheobronchitis    Acute respiratory failure (HCC)    Fever    Ventilator associated pneumonia (HCC)       Height:     Wt Readings from Last 1 Encounters:   07/09/20 124 lb 5 oz (56.4 kg)     Allergies:  Sulfa antibiotics       No results found for: PROCAL  Recent Labs     07/09/20  1125   BUN 19     Recent Labs     07/09/20  1125   CREATININE 0.68*     Recent Labs     07/09/20  1125   WBC 20.2*       Intake/Output Summary (Last 24 hours) at 7/10/2020 0122  Last data filed at 7/9/2020 1622  Gross per 24 hour   Intake --   Output 250 ml   Net -250 ml       Temp: 99 F    Culture Date / Dilcia Kay  /  Results  7/9/20   blood x2   no growth 10hrs, pending    Actual Weight:    Wt Readings from Last 1 Encounters:   07/09/20 124 lb 5 oz (56.4 kg)     CrCl based on IBW\"  98 ml/min        PLAN   Initial loading dose of 25mg/kg (max of 2500 mg) = 1500  mg   2. Vancomycin 1000 mg IV every 12 hours. 3.   Ensured BUN/sCr ordered at baseline and at least every 3rd day. 4.   ONLY for suspected pneumonia or COPD: MRSA nasal swab  not on file. Order placed. .    5. Trough ordered for: 7/11/20 @0100 . Trough Goals (Non-dialysis patient) Peaks are not routinely recommended.   10-20 mcg/mL for mild skin/soft tissue infections or UTI.  15-20 mcg/mL is the target trough for all other indications that MRSA infection is suspected. TROUGH TIMING: (Additional levels drawn based on renal function and/or clinical response). Dosing interval Timing of Trough   Every 8 hr, 12 hr, or 18 hr regimen Prior to the 4th dose  Twice weekly troughs for every 8 hour dosing   Every 24 hr regimen Prior to the 3rd or 4th dose   Every 36 hr regimen Prior to the 3rd dose           Thank you for the consult. Pharmacy will continue to follow.   ANKIT SHEIKH RPh/PharmKAVIN  7/10/2020  1:22 AM

## 2020-07-10 NOTE — PROGRESS NOTES
[address] Hospitalist        7/10/2020   4:52 PM    Name:  Sabina Cage  MRN:    [id number]     [place]:     [de-identified]   Room:  [address] [place] [address] Day: 1     Admit Date: 7/9/2020 11:34 AM  PCP: Manuelito Younger MD    C/C:   Chief Complaint   Patient presents with    Shortness of Breath     yesterday       Assessment:      · Ventilator associated pneumonia   · Left lower lobe pneumonia  · Aspiration pneumonia  · Amyotrophic lateral sclerosis   · Recent tracheostomy  · Acute on chronic respiratory failure   · DJD Lumbar spine   · Chronic back pain    · Nephrolithiasis   · Malnourishment   · Contact dermatitis   · Adjustment disorder        Plan:        · Admit to ICU  · Monitor vitals closely  · Keep SpO2 above 90%  · Mechanical ventilation through tracheostomy   · Is/ Os  · Daily weights   · RT eval  · Duo Neb prn  · IV fluids- NS at 75ml/h  · IV Antibiotics  · IV Vanco  · Pharmacy to dose  · IV Zosyn  · Anti emetics prn  · CXR in am  · Pulmonology consulted in ER  · NPO for now- advance to Dental soft if tolerates  · Swallow study  · Lovenox for Px  · D/w RN  · DVT and GI prophylaxis  · Start morphine 4 mg IV every 4 hours as needed for moderate to severe pain  · Once taking p.o., may take Norco PRN      Subjective:     Patient seen and examined at bedside. No overnight events. No acute complaints today. Afebrile  Complains of occasional chills  Communicates by writing  Tracheostomy with ventilator  Says he has a feeling of choking at times  Says he has difficulty breathing intermittently  Reviewed vitals with patient  Complains of left-sided chest pain    Pt. Denies any palpitation, HA, dizziness, chills, changes in urination, BM or skin changes    ROS:  A 10 point system reviewed and negative otherwise mentioned above.         Physical Examination:      Vitals:  /82   Pulse 98   Temp 98.9 °F (37.2 °C) (Temporal)   Resp 27   Ht 5' 10\" (1.778 m)   Wt 129 lb 1.6 oz (58.6 kg) HYDROcodone-acetaminophen (NORCO) 5-325 MG per tablet 1 tablet, 1 tablet, Oral, Q4H PRN, Zeynep Painter MD    0.9 % sodium chloride infusion, , Intravenous, Continuous, Zeynep Painter MD, Last Rate: 75 mL/hr at 07/10/20 0003    sodium chloride flush 0.9 % injection 10 mL, 10 mL, Intravenous, 2 times per day, Zeynep Painter MD    sodium chloride flush 0.9 % injection 10 mL, 10 mL, Intravenous, PRN, Zeynep Painter MD    acetaminophen (TYLENOL) tablet 650 mg, 650 mg, Oral, Q6H PRN **OR** acetaminophen (TYLENOL) suppository 650 mg, 650 mg, Rectal, Q6H PRN, Zeynep Painter MD    magnesium hydroxide (MILK OF MAGNESIA) 400 MG/5ML suspension 30 mL, 30 mL, Oral, Daily PRN, Zeynep Painter MD    promethazine (PHENERGAN) tablet 12.5 mg, 12.5 mg, Oral, Q6H PRN **OR** ondansetron (ZOFRAN) injection 4 mg, 4 mg, Intravenous, Q6H PRN, Zeynep Painter MD    enoxaparin (LOVENOX) injection 40 mg, 40 mg, Subcutaneous, Daily, Zeynep Painter MD, 40 mg at 07/10/20 1010    albuterol (PROVENTIL) nebulizer solution 2.5 mg, 2.5 mg, Nebulization, Q2H PRN, Zeynep Painter MD    ipratropium-albuterol (DUONEB) nebulizer solution 1 ampule, 1 ampule, Inhalation, Q4H WA, Zeynep Painter MD, 1 ampule at 07/10/20 1603    piperacillin-tazobactam (ZOSYN) 3.375 g in dextrose 5 % 50 mL IVPB extended infusion (mini-bag), 3.375 g, Intravenous, Q8H, Zeynep Painter MD, Stopped at 07/10/20 1415      I/O (24Hr):     Intake/Output Summary (Last 24 hours) at 7/10/2020 1652  Last data filed at 7/10/2020 0700  Gross per 24 hour   Intake 1600 ml   Output 250 ml   Net 1350 ml       Data:           Labs:    Hematology:  Recent Labs     07/09/20  1125 07/10/20  0430   WBC 20.2* 15.3*   RBC 4.24 3.49*   HGB 13.7 11.1*   HCT 41.7 34.2*   MCV 98.3 98.0   MCH 32.3 31.8   MCHC 32.9 32.5   RDW 13.3 13.5    164   MPV 9.1 9.6     Chemistry:  Recent Labs     07/09/20  1125 07/10/20  0430    140   K 4.2 4.8    105   CO2 20 25   GLUCOSE 168* 143*   BUN 19

## 2020-07-10 NOTE — PROGRESS NOTES
07/10/20 1459   Patient Transport   Time spent transporting 16-30   Transport ventillation type Transport vent   Transport from [employer] destination Other  (X-Ray)       The transport originated from ICU. Pt. was transported to X-RAY. Assisting with the transport was RN. Appropriate devices were applied to monitor the patient's condition during transport. Patient transported  via 100% O2 via ventilator. Patient tolerated well.         Compa Purpura  3:05 PM

## 2020-07-10 NOTE — CARE COORDINATION
Case Management Initial Discharge Plan  Narcisa Almeida,         Readmission Risk              Risk of Unplanned Readmission:        14             Met with:patient to discuss discharge plans. Information verified: address, contacts, phone number, , insurance Yes  PCP: Shabnam Warren MD  Date of last visit: unsure     Insurance Provider: medicare and medicaid     Discharge Planning  Current Residence:  Private home   Living Arrangements:    spouse Poli Grandchild has 1 stories/5 stairs to climb to enter the home   Support Systems:   wife and mother       Current Services PTA:   1. Home care thru [facility]   2. Home 02 , trilogy vent thru [employer]    Patient able to perform ADL's:Dependent  DME in home:  Trach supplies, ventilator ( trilogy thru [employer]) home 02 , walker cane, suction, shower stool  DME used to aid ambulation prior to admission:   All above   DME used during admission:  vent    Potential Assistance Needed:    home care    Pharmacy: [facility]    Potential Assistance Purchasing Medications:    none  Does patient want to participate in local refill/ meds to beds program?    no     Patient agreeable to home care: Yes  Freedom of choice provided:  yes      Type of Home Care Services:   skilled RN   Patient expects to be discharged to:   home    The Plan for Transition of Care is related to the following treatment goals: skilled RN     The Patient and/or patient representative pravin Nathan  was provided with a choice of provider and agrees   with the discharge plan. [x] Yes [] No    Freedom of choice list was provided with basic dialogue that supports the patient's individualized plan of care/goals, treatment preferences and shares the quality data associated with the providers.  [x] Yes [] No    Prior SNF/Rehab Placement and Facility: none   Agreeable to SNF/Rehab: No  [place] of choice provided: n/a   Evaluation: no    Expected Discharge date:    7/15  Follow Up Appointment: Marc Cardoza Day/ Time:  any     Transportation provider: per [employer] arrangements needed for discharge: Yes    Discharge Plan:   Patient known to me from prior admission . He has ALS and his trilogy NIV was changed to Invasive vent last admission. In home wife has 02 all the time, vent, suction machine. Garry has set up with trach supplies for the next month. Wife stated the only thing that did not come thru was his ensure. Call to [employer] is working on getting ensure alive PA and still in process but once medicaid authorizes will be shipped directly to his home    Patient currently on our vent, wife will have to bring in his vent to use for transport. May need to bring in day before dc as trial.     Will use [facility] on discharge with his trilogy vent. "[employer] porfirio is sydni at [phone]. JEY In epic and notified garry of the admission.      Electronically signed by Manny Daigle RN on 7/10/20 at 1:47 PM EDT

## 2020-07-10 NOTE — FLOWSHEET NOTE
Patient sleeping; no family present. Writer prays for patient; leaves note of support on tray table. Spiritual Care will follow as needed.      07/10/20 7756   Encounter Summary   Services provided to: Patient   Referral/Consult From: [place]   Continue Visiting   (7/10/20 sleeping)   Complexity of Encounter Low   Length of Encounter 15 minutes   Routine   Type Initial   Assessment Sleeping

## 2020-07-11 PROBLEM — E43 SEVERE MALNUTRITION (HCC): Chronic | Status: ACTIVE | Noted: 2020-07-11

## 2020-07-11 LAB
ANION GAP SERPL CALCULATED.3IONS-SCNC: 11 MMOL/L (ref 9–17)
BUN BLDV-MCNC: 13 MG/DL (ref 6–20)
BUN/CREAT BLD: 32 (ref 9–20)
CALCIUM SERPL-MCNC: 8.8 MG/DL (ref 8.6–10.4)
CHLORIDE BLD-SCNC: 100 MMOL/L (ref 98–107)
CO2: 26 MMOL/L (ref 20–31)
CREAT SERPL-MCNC: 0.41 MG/DL (ref 0.7–1.2)
GFR AFRICAN AMERICAN: >60 ML/MIN
GFR NON-AFRICAN AMERICAN: >60 ML/MIN
GFR SERPL CREATININE-BSD FRML MDRD: ABNORMAL ML/MIN/{1.73_M2}
GFR SERPL CREATININE-BSD FRML MDRD: ABNORMAL ML/MIN/{1.73_M2}
GLUCOSE BLD-MCNC: 105 MG/DL (ref 70–99)
GLUCOSE BLD-MCNC: 107 MG/DL (ref 75–110)
HCT VFR BLD CALC: 31.7 % (ref 40.7–50.3)
HEMOGLOBIN: 10.4 G/DL (ref 13–17)
MCH RBC QN AUTO: 31.8 PG (ref 25.2–33.5)
MCHC RBC AUTO-ENTMCNC: 32.8 G/DL (ref 28.4–34.8)
MCV RBC AUTO: 96.9 FL (ref 82.6–102.9)
NRBC AUTOMATED: 0 PER 100 WBC
PDW BLD-RTO: 13 % (ref 11.8–14.4)
PLATELET # BLD: 166 K/UL (ref 138–453)
PMV BLD AUTO: 9.3 FL (ref 8.1–13.5)
POTASSIUM SERPL-SCNC: 3.7 MMOL/L (ref 3.7–5.3)
RBC # BLD: 3.27 M/UL (ref 4.21–5.77)
SODIUM BLD-SCNC: 137 MMOL/L (ref 135–144)
VANCOMYCIN TROUGH DATE LAST DOSE: ABNORMAL
VANCOMYCIN TROUGH DOSE AMOUNT: ABNORMAL
VANCOMYCIN TROUGH TIME LAST DOSE: ABNORMAL
VANCOMYCIN TROUGH: 9.8 UG/ML (ref 10–20)
WBC # BLD: 14.1 K/UL (ref 3.5–11.3)

## 2020-07-11 PROCEDURE — 36415 COLL VENOUS BLD VENIPUNCTURE: CPT

## 2020-07-11 PROCEDURE — 94640 AIRWAY INHALATION TREATMENT: CPT

## 2020-07-11 PROCEDURE — 86403 PARTICLE AGGLUT ANTBDY SCRN: CPT

## 2020-07-11 PROCEDURE — 80048 BASIC METABOLIC PNL TOTAL CA: CPT

## 2020-07-11 PROCEDURE — 87186 SC STD MICRODIL/AGAR DIL: CPT

## 2020-07-11 PROCEDURE — 85027 COMPLETE CBC AUTOMATED: CPT

## 2020-07-11 PROCEDURE — 2700000000 HC OXYGEN THERAPY PER DAY

## 2020-07-11 PROCEDURE — 6360000002 HC RX W HCPCS: Performed by: FAMILY MEDICINE

## 2020-07-11 PROCEDURE — 94761 N-INVAS EAR/PLS OXIMETRY MLT: CPT

## 2020-07-11 PROCEDURE — 6370000000 HC RX 637 (ALT 250 FOR IP): Performed by: FAMILY MEDICINE

## 2020-07-11 PROCEDURE — 82947 ASSAY GLUCOSE BLOOD QUANT: CPT

## 2020-07-11 PROCEDURE — 87070 CULTURE OTHR SPECIMN AEROBIC: CPT

## 2020-07-11 PROCEDURE — 87205 SMEAR GRAM STAIN: CPT

## 2020-07-11 PROCEDURE — 2000000000 HC ICU R&B

## 2020-07-11 PROCEDURE — 80202 ASSAY OF VANCOMYCIN: CPT

## 2020-07-11 PROCEDURE — 94770 HC ETCO2 MONITOR DAILY: CPT

## 2020-07-11 PROCEDURE — 2580000003 HC RX 258: Performed by: FAMILY MEDICINE

## 2020-07-11 PROCEDURE — 94003 VENT MGMT INPAT SUBQ DAY: CPT

## 2020-07-11 RX ADMIN — VANCOMYCIN HYDROCHLORIDE 1000 MG: 1 INJECTION, POWDER, LYOPHILIZED, FOR SOLUTION INTRAVENOUS at 21:40

## 2020-07-11 RX ADMIN — ENOXAPARIN SODIUM 40 MG: 40 INJECTION SUBCUTANEOUS at 09:20

## 2020-07-11 RX ADMIN — SODIUM CHLORIDE: 9 INJECTION, SOLUTION INTRAVENOUS at 06:49

## 2020-07-11 RX ADMIN — VANCOMYCIN HYDROCHLORIDE 1000 MG: 1 INJECTION, POWDER, LYOPHILIZED, FOR SOLUTION INTRAVENOUS at 05:06

## 2020-07-11 RX ADMIN — PIPERACILLIN AND TAZOBACTAM 3.38 G: 3; .375 INJECTION, POWDER, LYOPHILIZED, FOR SOLUTION INTRAVENOUS at 01:35

## 2020-07-11 RX ADMIN — IPRATROPIUM BROMIDE AND ALBUTEROL SULFATE 1 AMPULE: .5; 3 SOLUTION RESPIRATORY (INHALATION) at 19:19

## 2020-07-11 RX ADMIN — VANCOMYCIN HYDROCHLORIDE 1000 MG: 1 INJECTION, POWDER, LYOPHILIZED, FOR SOLUTION INTRAVENOUS at 14:04

## 2020-07-11 RX ADMIN — PIPERACILLIN AND TAZOBACTAM 3.38 G: 3; .375 INJECTION, POWDER, LYOPHILIZED, FOR SOLUTION INTRAVENOUS at 09:20

## 2020-07-11 RX ADMIN — PIPERACILLIN AND TAZOBACTAM 3.38 G: 3; .375 INJECTION, POWDER, LYOPHILIZED, FOR SOLUTION INTRAVENOUS at 17:58

## 2020-07-11 RX ADMIN — IPRATROPIUM BROMIDE AND ALBUTEROL SULFATE 1 AMPULE: .5; 3 SOLUTION RESPIRATORY (INHALATION) at 11:00

## 2020-07-11 RX ADMIN — IPRATROPIUM BROMIDE AND ALBUTEROL SULFATE 1 AMPULE: .5; 3 SOLUTION RESPIRATORY (INHALATION) at 07:27

## 2020-07-11 RX ADMIN — IPRATROPIUM BROMIDE AND ALBUTEROL SULFATE 1 AMPULE: .5; 3 SOLUTION RESPIRATORY (INHALATION) at 16:54

## 2020-07-11 ASSESSMENT — PULMONARY FUNCTION TESTS
PIF_VALUE: 20
PIF_VALUE: 18
PIF_VALUE: 18
PIF_VALUE: 21
PIF_VALUE: 19

## 2020-07-11 NOTE — PROGRESS NOTES
Pharmacy Vancomycin Consult     Vancomycin Day: 2  Current Dosing: Vancomycin 1000 mg IV every 12 hours    Temp max:  98.9 F    Recent Labs     07/09/20  1125 07/10/20  0430   BUN 19 15       Recent Labs     07/09/20  1125 07/10/20  0430   CREATININE 0.68* 0.49*       Recent Labs     07/09/20  1125 07/10/20  0430   WBC 20.2* 15.3*         Intake/Output Summary (Last 24 hours) at 7/11/2020 0327  Last data filed at 7/10/2020 1903  Gross per 24 hour   Intake 1600 ml   Output 800 ml   Net 800 ml       Culture Date      Source                       Results  7/9/20   blood x2   no growth 1day; pending      Ht Readings from Last 1 Encounters:   07/09/20 5' 10\" (1.778 m)        Wt Readings from Last 1 Encounters:   07/10/20 129 lb 1.6 oz (58.6 kg)         Body mass index is 18.52 kg/m². Estimated Creatinine Clearance: 141 mL/min (A) (based on SCr of 0.49 mg/dL (L)). Trough: 9.8 mcg/ml 7/11/20 @01:48    Assessment/Plan:  Vancomycin trough level is below goal range 15-20 mcg/ml. Will change to Vancomycin 1000 mg IV every 8 hours. Trough prior to 4th dose of new dosing regimen 7/12/20 0300. Thank you for the consult. Will continue to follow. Rosemary Urban.  Roy Dakin  7/11/2020  3:35 AM

## 2020-07-11 NOTE — PROGRESS NOTES
PULMONARY PROGRESS NOTE:    Interval History: Acute on chronic hypoxic and hypercarbic respiratory failure, pneumonia, aspiration, ALS, remote history of smoking    Shortness of Breath: denies  Cough: no  Sputum: + asking for freq suctioning  Hemoptysis: no  Chest Pain: no  Fever: no  Swelling Feet: no  Headache: no  Nausea, Emesis, Abdominal Pain: no  Diarrhea: no  Constipation: no  Not a big fan of the food here but he is drinking the ensure shakes    Events since last visit: none    PAST MEDICAL HISTORY:    Smoking:     PHYSICAL EXAMINATION:  afebrile  General : Awake, alert,   Neck - supple, no lymphadenopathy, JVD not raised; trach +  Heart - regular rhythm, S1 and S2 normal; no additional sounds heard  Lungs - Air Entry- fair bilaterally; breath sounds : a little coarse ,+ trach  Abdomen - soft, no tenderness  Upper Extremities  - no cyanosis, mottling; edema : absent  Lower Extremities: no cyanosis, mottling; edema : absent    Current Laboratory, Radiologic, Microbiologic, and Diagnostic studies reviewed    Impression:  · Acute on chronic hypoxic and hypercarbic respiratory failure  · LLL pneumonia  · Aspiration  · ALS s/p recent trach  · ~10-pack-year smoking, during his 25s     Recommendations:  · Continue IV antibiotics, zoysyn, vancomycin  · IV fluids  · Swallow study, This p.m. · Albuterol and Ipratropium Q 4 hours and prn  · Patient may need bronch for mucous plugging  · Labs: CBC and BMP in am  · Vent support  · Trach care  · DVT prophylaxis with low molecular weight heparin  · Will follow with you    Plan of care discussed with Dr Felix Tirado, APRN - CNP 7-11-20 5966    REASON FOR VISIT: pneumonia, resp failure    I examined the patient myself. I have reviewed HPI, ROS, current medications, labs and pertinent radiographic studies with patient and NP. The assessment and Plan in the note per my discussion with NP.       Electronically signed by Michael Oneill on 07/11/20 at 6:26 PM.

## 2020-07-11 NOTE — PROGRESS NOTES
Nutrition Assessment     Type and Reason for Visit: Initial, Positive Nutrition Screen    Nutrition Recommendations/Plan:   1. Start Ensure Enlive TID  2. Monitor diet tolerance/intakes, swallowing, weight    Nutrition Assessment:  Patient admitted with nutrition screen for poor intake/poor appetite and weight loss. Patient with trach and has pain with swallowing. PO intakes of 26-50% of meals. Will provide Ensure (vanilla) TID and monitor nutrition status. Malnutrition Assessment:  Malnutrition Status: Severe malnutrition    Estimated Daily Nutrient Needs:  Energy (kcal): 0714-2779 kcal (30-33 kcal/kg); Weight Used for Energy Requirements:  Current     Protein (g): 70-88 g (1.2-1.5 g/kg);  Weight Used for Protein Requirements:  Current        Fluid (ml/day): 1 mL/kcal; Weight Used for Fluid Requirements:  Current      Nutrition Related Findings: GI: WDL, last BM noted 7/9; Edema: none; Skin: WDL, has tracheostomy      Current Nutrition Therapies:    DIET DENTAL SOFT;  Dietary Nutrition Supplements: Standard High Calorie Oral Supplement    Anthropometric Measures:  · Height: 5' 10\" (177.8 cm)  · Current Body Wt: 129 lb 3 oz (58.6 kg)   · BMI: 18.5    Nutrition Diagnosis:   · Inadequate oral intake related to swallowing difficulty(d/t tracheostomy) as evidenced by intake 26-50%, poor intake prior to admission      Nutrition Interventions:   Food and/or Nutrient Delivery:  Continue Current Diet, Start Oral Nutrition Supplement  Nutrition Education/Counseling:  No recommendation at this time   Coordination of Nutrition Care:  Continued Inpatient Monitoring    Goals:  PO intake to meet >75% energy and protein needs       Nutrition Monitoring and Evaluation:   Food/Nutrient Intake Outcomes:  Food and Nutrient Intake, Supplement Intake  Physical Signs/Symptoms Outcomes:  Chewing or Swallowing, Weight, GI Status     Discharge Planning:    Continue Oral Nutrition Supplement, Continue current diet     [address],

## 2020-07-11 NOTE — PROGRESS NOTES
[address] Hospitalist        7/11/2020   2:54 PM    Name:  Valentino Haley  MRN:    [id number]     Jessicaide:     [de-identified]   Room:  [address] [place] [address] Day: 2     Admit Date: 7/9/2020 11:34 AM  PCP: Pauline Clifton MD    C/C:   Chief Complaint   Patient presents with    Shortness of Breath     yesterday       Assessment:      · Acute on chronic hypoxic and hypercarbic respiratory failure  · Aspiration pneumonia vs Ventilator associated pneumonia? · Sepsis present on admission  · Left lower lobe pneumonia  · Aspiration pneumonia  · Amyotrophic lateral sclerosis   · Recent tracheostomy  · DJD Lumbar spine   · Chronic back pain    · Nephrolithiasis   · Malnourishment   · Contact dermatitis   · Adjustment disorder        Plan:        · Admit to ICU  · Monitor vitals closely  · Keep SpO2 above 90%  · Mechanical ventilation through tracheostomy   · Is/ Os  · Daily weights   · RT eval  · Duo Neb prn  · IV fluids- NS at 75ml/h- reduce and wean off  · IV Antibiotics  · IV Vanco  · Pharmacy to dose  · IV Zosyn  · Anti emetics prn  · CXR in am  · Pulmonology consulted in ER  · NPO for now- advance to Dental soft if tolerates- advance  · Swallow study  · Lovenox for Px  · D/w RN  · DVT and GI prophylaxis  · Start morphine 4 mg IV every 4 hours as needed for moderate to severe pain  · Once taking p.o., may take Norco PRN- now PO  · Nutrition consulted       Subjective:     Patient seen and examined at bedside. No overnight events. No acute complaints today. Afebrile  Communicates by writing  Tracheostomy with ventilator  Says he has a feeling of choking at times but better  Says he has difficulty breathing intermittently also better  Reviewed vitals with patient  Complains of left-sided chest pain which is secondary to the pneumonia    Pt.  Denies any palpitation, HA, dizziness, chills, changes in urination, BM or skin changes    ROS:  A 10 point system reviewed and negative otherwise mentioned above.        Physical Examination:      Vitals:  /77   Pulse 89   Temp 98.4 °F (36.9 °C) (Temporal)   Resp 17   Ht 5' 10\" (1.778 m)   Wt 129 lb 1.6 oz (58.6 kg)   SpO2 98%   BMI 18.52 kg/m²   Temp (24hrs), Av.5 °F (36.9 °C), Min:98.2 °F (36.8 °C), Max:98.9 °F (37.2 °C)    Weight:   Wt Readings from Last 3 Encounters:   07/10/20 129 lb 1.6 oz (58.6 kg)   20 134 lb (60.8 kg)   20 130 lb (59 kg)     I/O last 3 completed shifts:  I/O last 3 completed shifts: In: 1060.4 [P.O.:30; I.V.:1030.4]  Out: 750 [Urine:750]     Recent Labs     20  1129   POCGLU 107         General appearance - alert, well appearing, and in no acute distress  Mental status - oriented to person, place, and time with normal affect  Head - normocephalic and atraumatic  Eyes - pupils equal and reactive, extraocular eye movements intact, conjunctiva clear  Ears - hearing appears to be intact  Nose - no drainage noted  Mouth - mucous membranes moist  Neck - supple, no carotid bruits, thyroid not palpable, tracheostomy  Chest -coarse crackles to auscultation, normal effort  Heart - normal rate, regular rhythm, no murmur  Abdomen - soft, nontender, nondistended, bowel sounds present all four quadrants, no masses, hepatomegaly or splenomegaly  Neurological - normal speech, no focal findings or movement disorder noted, cranial nerves II through XII grossly intact  Extremities - peripheral pulses palpable, no pedal edema or calf pain with palpation  Skin - no gross lesions, rashes, or induration noted, multiple tattoos. Back rash resolved        Medications: Allergies:    Allergies   Allergen Reactions    Sulfa Antibiotics Other (See Comments)     As child (unknown reaction)       Current Meds:   Current Facility-Administered Medications:     vancomycin 1000 mg IVPB in 250 mL D5W addavial, 1,000 mg, Intravenous, Q8H, Zeynep Painter MD, Last Rate: 250 mL/hr at 07/11/20 1404, 1,000 mg at 20 1404    vancomycin 41.7 34.2* 31.7*   MCV 98.3 98.0 96.9   MCH 32.3 31.8 31.8   MCHC 32.9 32.5 32.8   RDW 13.3 13.5 13.0    164 166   MPV 9.1 9.6 9.3     Chemistry:  Recent Labs     07/09/20  1125 07/10/20  0430 07/11/20  0435    140 137   K 4.2 4.8 3.7    105 100   CO2 20 25 26   GLUCOSE 168* 143* 105*   BUN 19 15 13   CREATININE 0.68* 0.49* 0.41*   ANIONGAP 19* 10 11   LABGLOM >60 >60 >60   GFRAA >60 >60 >60   CALCIUM 9.5 9.0 8.8   TROPHS 150*  --   --    MYOGLOBIN 78*  --   --      Recent Labs     07/11/20  1129   POCGLU 107       Lab Results   Component Value Date/Time    SPECIAL RAC 10ML 07/09/2020 11:36 AM     Lab Results   Component Value Date/Time    CULTURE NO GROWTH 2 DAYS 07/09/2020 11:36 AM       Lab Results   Component Value Date    POCPH 7.39 07/10/2020    POCPCO2 43 07/10/2020    POCPO2 92 07/10/2020    POCHCO3 25.7 07/10/2020    NBEA NOT REPORTED 07/10/2020    PBEA 1 07/10/2020    CJD7GEJ 27 07/10/2020    QBGQ1ZBU 97 07/10/2020    FIO2 NOT REPORTED 07/10/2020       Radiology:    Xr Chest Portable    Result Date: 7/10/2020  Small increased left lung base opacity is nonspecific and may represent atelectasis with pneumonia or aspiration not excluded. Xr Chest Portable    Result Date: 7/9/2020  Acute airspace disease at the left lung base concerning for pneumonia or aspiration. Radiographic follow-up is recommended. Ct Chest Pulmonary Embolism W Contrast    Result Date: 7/9/2020  1. Debris in the trachea predominantly extends into the left mainstem bronchus with associated consolidative opacity in the left lower lobe and inferior lingula, suggestive of aspiration pneumonitis. There is also debris in the right lower lobe segmental bronchi without associated airspace disease. 2.  No evidence for acute pulmonary embolism. Fl Modified Barium Swallow W Video    Result Date: 7/10/2020  1. Flash penetration with the thin liquid substance by cup without aspiration.  2. No penetration or aspiration with the remainder of the administered substances/attempts. Please see separate speech pathology report for full discussion of findings and recommendations. All radiological studies reviewed  Code Status:  DNR-CCA        Electronically signed by Britney Bolanos MD on 7/11/2020 at 2:54 PM    This note was created with the assistance of a speech-recognition program.  Although the intention is to generate a document that actually reflects the content of the visit, no guarantees can be provided that every mistake has been identified and corrected by editing. Note was updated later by me after  physical examination and  completion of the assessment.

## 2020-07-12 ENCOUNTER — APPOINTMENT (OUTPATIENT)
Dept: GENERAL RADIOLOGY | Age: 55
DRG: 870 | End: 2020-07-12
Payer: MEDICARE

## 2020-07-12 LAB
ANION GAP SERPL CALCULATED.3IONS-SCNC: 7 MMOL/L (ref 9–17)
BUN BLDV-MCNC: 10 MG/DL (ref 6–20)
BUN/CREAT BLD: 23 (ref 9–20)
CALCIUM SERPL-MCNC: 8.7 MG/DL (ref 8.6–10.4)
CHLORIDE BLD-SCNC: 100 MMOL/L (ref 98–107)
CO2: 30 MMOL/L (ref 20–31)
CREAT SERPL-MCNC: 0.43 MG/DL (ref 0.7–1.2)
GFR AFRICAN AMERICAN: >60 ML/MIN
GFR NON-AFRICAN AMERICAN: >60 ML/MIN
GFR SERPL CREATININE-BSD FRML MDRD: ABNORMAL ML/MIN/{1.73_M2}
GFR SERPL CREATININE-BSD FRML MDRD: ABNORMAL ML/MIN/{1.73_M2}
GLUCOSE BLD-MCNC: 109 MG/DL (ref 70–99)
HCT VFR BLD CALC: 31 % (ref 40.7–50.3)
HEMOGLOBIN: 10.1 G/DL (ref 13–17)
MCH RBC QN AUTO: 32 PG (ref 25.2–33.5)
MCHC RBC AUTO-ENTMCNC: 32.6 G/DL (ref 28.4–34.8)
MCV RBC AUTO: 98.1 FL (ref 82.6–102.9)
NRBC AUTOMATED: 0 PER 100 WBC
PDW BLD-RTO: 13.2 % (ref 11.8–14.4)
PLATELET # BLD: 194 K/UL (ref 138–453)
PMV BLD AUTO: 9.7 FL (ref 8.1–13.5)
POTASSIUM SERPL-SCNC: 3.8 MMOL/L (ref 3.7–5.3)
RBC # BLD: 3.16 M/UL (ref 4.21–5.77)
SODIUM BLD-SCNC: 137 MMOL/L (ref 135–144)
VANCOMYCIN TROUGH DATE LAST DOSE: NORMAL
VANCOMYCIN TROUGH DOSE AMOUNT: NORMAL
VANCOMYCIN TROUGH TIME LAST DOSE: NORMAL
VANCOMYCIN TROUGH: 17.1 UG/ML (ref 10–20)
WBC # BLD: 12.1 K/UL (ref 3.5–11.3)

## 2020-07-12 PROCEDURE — 85027 COMPLETE CBC AUTOMATED: CPT

## 2020-07-12 PROCEDURE — 36415 COLL VENOUS BLD VENIPUNCTURE: CPT

## 2020-07-12 PROCEDURE — 94640 AIRWAY INHALATION TREATMENT: CPT

## 2020-07-12 PROCEDURE — 6360000002 HC RX W HCPCS: Performed by: FAMILY MEDICINE

## 2020-07-12 PROCEDURE — 2580000003 HC RX 258: Performed by: FAMILY MEDICINE

## 2020-07-12 PROCEDURE — 71045 X-RAY EXAM CHEST 1 VIEW: CPT

## 2020-07-12 PROCEDURE — 94003 VENT MGMT INPAT SUBQ DAY: CPT

## 2020-07-12 PROCEDURE — 80202 ASSAY OF VANCOMYCIN: CPT

## 2020-07-12 PROCEDURE — 2000000000 HC ICU R&B

## 2020-07-12 PROCEDURE — 6370000000 HC RX 637 (ALT 250 FOR IP): Performed by: FAMILY MEDICINE

## 2020-07-12 PROCEDURE — 80048 BASIC METABOLIC PNL TOTAL CA: CPT

## 2020-07-12 RX ADMIN — PIPERACILLIN AND TAZOBACTAM 3.38 G: 3; .375 INJECTION, POWDER, LYOPHILIZED, FOR SOLUTION INTRAVENOUS at 09:27

## 2020-07-12 RX ADMIN — VANCOMYCIN HYDROCHLORIDE 1000 MG: 1 INJECTION, POWDER, LYOPHILIZED, FOR SOLUTION INTRAVENOUS at 13:58

## 2020-07-12 RX ADMIN — IPRATROPIUM BROMIDE AND ALBUTEROL SULFATE 1 AMPULE: .5; 3 SOLUTION RESPIRATORY (INHALATION) at 07:58

## 2020-07-12 RX ADMIN — PIPERACILLIN AND TAZOBACTAM 3.38 G: 3; .375 INJECTION, POWDER, LYOPHILIZED, FOR SOLUTION INTRAVENOUS at 01:31

## 2020-07-12 RX ADMIN — IPRATROPIUM BROMIDE AND ALBUTEROL SULFATE 1 AMPULE: .5; 3 SOLUTION RESPIRATORY (INHALATION) at 16:04

## 2020-07-12 RX ADMIN — IPRATROPIUM BROMIDE AND ALBUTEROL SULFATE 1 AMPULE: .5; 3 SOLUTION RESPIRATORY (INHALATION) at 11:50

## 2020-07-12 RX ADMIN — VANCOMYCIN HYDROCHLORIDE 1000 MG: 1 INJECTION, POWDER, LYOPHILIZED, FOR SOLUTION INTRAVENOUS at 22:25

## 2020-07-12 RX ADMIN — VANCOMYCIN HYDROCHLORIDE 1000 MG: 1 INJECTION, POWDER, LYOPHILIZED, FOR SOLUTION INTRAVENOUS at 05:44

## 2020-07-12 RX ADMIN — ENOXAPARIN SODIUM 40 MG: 40 INJECTION SUBCUTANEOUS at 09:27

## 2020-07-12 RX ADMIN — PIPERACILLIN AND TAZOBACTAM 3.38 G: 3; .375 INJECTION, POWDER, LYOPHILIZED, FOR SOLUTION INTRAVENOUS at 17:56

## 2020-07-12 RX ADMIN — IPRATROPIUM BROMIDE AND ALBUTEROL SULFATE 1 AMPULE: .5; 3 SOLUTION RESPIRATORY (INHALATION) at 19:17

## 2020-07-12 RX ADMIN — SODIUM CHLORIDE: 9 INJECTION, SOLUTION INTRAVENOUS at 13:45

## 2020-07-12 ASSESSMENT — PULMONARY FUNCTION TESTS
PIF_VALUE: 19
PIF_VALUE: 21
PIF_VALUE: 19
PIF_VALUE: 18
PIF_VALUE: 15
PIF_VALUE: 21
PIF_VALUE: 20
PIF_VALUE: 17

## 2020-07-12 NOTE — PROGRESS NOTES
Called to patients bedside per [facility]. Trach displaced, no reading on end tidal. Obturator placed in trach and trach gently manuvered into position. Breath sounds are equal and bilateral.easy cap turned yellow and end tidal reading of 44.

## 2020-07-12 NOTE — PROGRESS NOTES
[address] Hospitalist        7/12/2020   2:49 PM    Name:  Ame Jimenez  MRN:    [id number]     Jessicaide:     [de-identified]   Room:  [address] [place] [address] Day: 3     Admit Date: 7/9/2020 11:34 AM  PCP: Terry Olmedo MD    C/C:   Chief Complaint   Patient presents with    Shortness of Breath     yesterday       Assessment:      · Acute on chronic hypoxic and hypercarbic respiratory failure  · Aspiration pneumonia vs Ventilator associated pneumonia? · Sepsis present on admission  · Left lower lobe pneumonia  · Aspiration pneumonia  · Amyotrophic lateral sclerosis   · Recent tracheostomy  · DJD Lumbar spine   · Chronic back pain    · Nephrolithiasis   · Malnourishment   · Contact dermatitis   · Adjustment disorder        Plan:        · Admit to ICU  · Monitor vitals closely  · Keep SpO2 above 90%  · Mechanical ventilation through tracheostomy   · Is/ Os  · Daily weights   · RT eval  · Duo Neb prn  · IV fluids- NS at 75ml/h- reduce and wean off  · IV Antibiotics  · IV Vanco  · Pharmacy to dose  · IV Zosyn  · Anti emetics prn  · CXR in am  · Pulmonology consulted in ER  · NPO for now- advance to Dental soft if tolerates- advance  · Swallow study  · Lovenox for Px  · D/w RN  · DVT and GI prophylaxis  · Start morphine 4 mg IV every 4 hours as needed for moderate to severe pain  · Once taking p.o., may take Norco PRN- now PO  · Nutrition consulted   · D/w RN  · Need wife to be educated for [facility] at home by discharge      Subjective:     Patient seen and examined at bedside. Tracheostomy fell out last night  Needed suctioning and proper placement   No acute complaints today. Pt feels a little better today  Afebrile  Communicates by writing  Tracheostomy with ventilator      Pt. Denies any palpitation, HA, dizziness, chills, changes in urination, BM or skin changes    ROS:  A 10 point system reviewed and negative otherwise mentioned above.         Physical Examination:      Vitals:  /75 Pulse 76   Temp 98.2 °F (36.8 °C) (Temporal)   Resp 22   Ht 5' 10\" (1.778 m)   Wt 129 lb 1.6 oz (58.6 kg)   SpO2 98%   BMI 18.52 kg/m²   Temp (24hrs), Av °F (37.2 °C), Min:98.2 °F (36.8 °C), Max:99.9 °F (37.7 °C)    Weight:   Wt Readings from Last 3 Encounters:   07/10/20 129 lb 1.6 oz (58.6 kg)   20 134 lb (60.8 kg)   20 130 lb (59 kg)     I/O last 3 completed shifts:  I/O last 3 completed shifts: In: 1725.9 [P.O.:420; I.V.:1034.7; IV Piggyback:271.2]  Out: 1100 [Urine:1100]     Recent Labs     20  1129   POCGLU 107         General appearance - alert, well appearing, and in no acute distress  Mental status - oriented to person, place, and time with normal affect  Head - normocephalic and atraumatic  Eyes - pupils equal and reactive, extraocular eye movements intact, conjunctiva clear  Ears - hearing appears to be intact  Nose - no drainage noted  Mouth - mucous membranes moist  Neck - supple, no carotid bruits, thyroid not palpable, tracheostomy  Chest -coarse crackles to auscultation, normal effort  Heart - normal rate, regular rhythm, no murmur  Abdomen - soft, nontender, nondistended, bowel sounds present all four quadrants, no masses, hepatomegaly or splenomegaly  Neurological - normal speech, no focal findings or movement disorder noted, cranial nerves II through XII grossly intact  Extremities - peripheral pulses palpable, no pedal edema or calf pain with palpation  Skin - no gross lesions, rashes, or induration noted, multiple tattoos. Back rash resolved        Medications: Allergies:    Allergies   Allergen Reactions    Sulfa Antibiotics Other (See Comments)     As child (unknown reaction)       Current Meds:   Current Facility-Administered Medications:     vancomycin 1000 mg IVPB in 250 mL D5W addavial, 1,000 mg, Intravenous, Q8H, Zeynep Painter MD, Last Rate: 250 mL/hr at 20 1358, 1,000 mg at 20 1358    vancomycin (VANCOCIN) intermittent dosing (placeholder), , Other, RX Placeholder, Zeynep Painter MD    morphine sulfate (PF) injection 4 mg, 4 mg, Intravenous, Q4H PRN, Zeynep Painter MD    HYDROcodone-acetaminophen (NORCO) 5-325 MG per tablet 1 tablet, 1 tablet, Oral, Q4H PRN, Zeynep Painter MD    0.9 % sodium chloride infusion, , Intravenous, Continuous, Zeynep Painter MD, Last Rate: 75 mL/hr at 07/12/20 1345    sodium chloride flush 0.9 % injection 10 mL, 10 mL, Intravenous, 2 times per day, Joleen Vidal MD, Stopped at 07/10/20 2103    sodium chloride flush 0.9 % injection 10 mL, 10 mL, Intravenous, PRN, Zeynep Painter MD    acetaminophen (TYLENOL) tablet 650 mg, 650 mg, Oral, Q6H PRN **OR** acetaminophen (TYLENOL) suppository 650 mg, 650 mg, Rectal, Q6H PRN, Zeynep Painter MD    magnesium hydroxide (MILK OF MAGNESIA) 400 MG/5ML suspension 30 mL, 30 mL, Oral, Daily PRN, Zeynep Painter MD    promethazine (PHENERGAN) tablet 12.5 mg, 12.5 mg, Oral, Q6H PRN **OR** ondansetron (ZOFRAN) injection 4 mg, 4 mg, Intravenous, Q6H PRN, Zeynep Painter MD    enoxaparin (LOVENOX) injection 40 mg, 40 mg, Subcutaneous, Daily, Zeynep Painter MD, 40 mg at 07/12/20 0927    albuterol (PROVENTIL) nebulizer solution 2.5 mg, 2.5 mg, Nebulization, Q2H PRN, Joleen Vidal MD    ipratropium-albuterol (DUONEB) nebulizer solution 1 ampule, 1 ampule, Inhalation, Q4H WA, Zeynep Painter MD, 1 ampule at 07/12/20 1150    piperacillin-tazobactam (ZOSYN) 3.375 g in dextrose 5 % 50 mL IVPB extended infusion (mini-bag), 3.375 g, Intravenous, Q8H, Zeynep Painter MD, Last Rate: 12.5 mL/hr at 07/12/20 0927, 3.375 g at 07/12/20 0927      I/O (24Hr):     Intake/Output Summary (Last 24 hours) at 7/12/2020 1449  Last data filed at 7/12/2020 0548  Gross per 24 hour   Intake 1725.87 ml   Output 750 ml   Net 975.87 ml       Data:           Labs:    Hematology:  Recent Labs     07/10/20  0430 07/11/20  0435 07/12/20  0335   WBC 15.3* 14.1* 12.1*   RBC 3.49* 3.27* 3.16*   HGB 11.1* 10.4* 10.1*   HCT 34.2* 31.7* 31.0*   MCV 98.0 96.9 98.1   MCH 31.8 31.8 32.0   MCHC 32.5 32.8 32.6   RDW 13.5 13.0 13.2    166 194   MPV 9.6 9.3 9.7     Chemistry:  Recent Labs     07/10/20  0430 07/11/20  0435 07/12/20  0335    137 137   K 4.8 3.7 3.8    100 100   CO2 25 26 30   GLUCOSE 143* 105* 109*   BUN 15 13 10   CREATININE 0.49* 0.41* 0.43*   ANIONGAP 10 11 7*   LABGLOM >60 >60 >60   GFRAA >60 >60 >60   CALCIUM 9.0 8.8 8.7     Recent Labs     07/11/20  1129   POCGLU 107       Lab Results   Component Value Date/Time    SPECIAL NOT REPORTED 07/11/2020 09:50 AM     Lab Results   Component Value Date/Time    CULTURE STAPHYLOCOCCUS AUREUS LIGHT GROWTH (A) 07/11/2020 09:50 AM       Lab Results   Component Value Date    POCPH 7.39 07/10/2020    POCPCO2 43 07/10/2020    POCPO2 92 07/10/2020    POCHCO3 25.7 07/10/2020    NBEA NOT REPORTED 07/10/2020    PBEA 1 07/10/2020    QER2TBB 27 07/10/2020    QNVX1JFU 97 07/10/2020    FIO2 NOT REPORTED 07/10/2020       Radiology:    Xr Chest Portable    Result Date: 7/10/2020  Small increased left lung base opacity is nonspecific and may represent atelectasis with pneumonia or aspiration not excluded. Xr Chest Portable    Result Date: 7/9/2020  Acute airspace disease at the left lung base concerning for pneumonia or aspiration. Radiographic follow-up is recommended. Ct Chest Pulmonary Embolism W Contrast    Result Date: 7/9/2020  1. Debris in the trachea predominantly extends into the left mainstem bronchus with associated consolidative opacity in the left lower lobe and inferior lingula, suggestive of aspiration pneumonitis. There is also debris in the right lower lobe segmental bronchi without associated airspace disease. 2.  No evidence for acute pulmonary embolism. Fl Modified Barium Swallow W Video    Result Date: 7/10/2020  1. Flash penetration with the thin liquid substance by cup without aspiration.  2. No penetration or aspiration with the

## 2020-07-12 NOTE — PLAN OF CARE
Problem: Skin Integrity:  Goal: Will show no infection signs and symptoms  Description: Will show no infection signs and symptoms  Outcome: Ongoing  Goal: Absence of new skin breakdown  Description: Absence of new skin breakdown  Outcome: Ongoing     Problem: Falls - Risk of:  Goal: Will remain free from falls  Description: Will remain free from falls  Outcome: Ongoing  Goal: Absence of physical injury  Description: Absence of physical injury  Outcome: Ongoing  Pt is a fall risk this admission. Pt uses call light appropriately when needing assistance. Bed in lowest locked position, call light and items within reach, side rails up X3 per pt preference. RN will continue to monitor.       Problem: Nutrition  Goal: Optimal nutrition therapy  Outcome: Ongoing

## 2020-07-12 NOTE — PROGRESS NOTES
Pharmacy Vancomycin Consult     Vancomycin Day: 3  Current Dosing: Vancomycin 1000 mg IV every 8 hours    Temp max:  99.9 F    Recent Labs     07/11/20  0435 07/12/20  0335   BUN 13 PENDING       Recent Labs     07/11/20  0435 07/12/20  0335   CREATININE 0.41* PENDING       Recent Labs     07/11/20  0435 07/12/20  0335   WBC 14.1* 12.1*         Intake/Output Summary (Last 24 hours) at 7/12/2020 0439  Last data filed at 7/11/2020 1759  Gross per 24 hour   Intake 2057.42 ml   Output 1000 ml   Net 1057.42 ml       Culture Date      Source                       Results  7/9/20   blood x2   no growth 2days; pending    Ht Readings from Last 1 Encounters:   07/11/20 5' 10\" (1.778 m)        Wt Readings from Last 1 Encounters:   07/10/20 129 lb 1.6 oz (58.6 kg)         Body mass index is 18.52 kg/m². CrCl cannot be calculated (This lab value cannot be used to calculate CrCl because it is not a number: PENDING). Trough: 17.1 mcg/ml 7/12/20 @03:35    Assessment/Plan:  Vancomycin trough level is therapeutic within goal range 15-20 mcg/ml. Continue Vancomycin 1000 mg IV every 8 hours. Draw trough twice weekly while on q8h dosing. Thank you for the consult. Will continue to follow. Дмитрий Banerjee.  Giancarlo Posada, [place]  7/12/2020  4:44 AM

## 2020-07-13 LAB
ANION GAP SERPL CALCULATED.3IONS-SCNC: 11 MMOL/L (ref 9–17)
BUN BLDV-MCNC: 7 MG/DL (ref 6–20)
BUN/CREAT BLD: 16 (ref 9–20)
CALCIUM SERPL-MCNC: 8.7 MG/DL (ref 8.6–10.4)
CHLORIDE BLD-SCNC: 99 MMOL/L (ref 98–107)
CO2: 31 MMOL/L (ref 20–31)
CREAT SERPL-MCNC: 0.45 MG/DL (ref 0.7–1.2)
CULTURE: ABNORMAL
CULTURE: ABNORMAL
DIRECT EXAM: ABNORMAL
GFR AFRICAN AMERICAN: >60 ML/MIN
GFR NON-AFRICAN AMERICAN: >60 ML/MIN
GFR SERPL CREATININE-BSD FRML MDRD: ABNORMAL ML/MIN/{1.73_M2}
GFR SERPL CREATININE-BSD FRML MDRD: ABNORMAL ML/MIN/{1.73_M2}
GLUCOSE BLD-MCNC: 96 MG/DL (ref 70–99)
HCT VFR BLD CALC: 31.4 % (ref 40.7–50.3)
HEMOGLOBIN: 9.9 G/DL (ref 13–17)
Lab: ABNORMAL
MAGNESIUM: 1.9 MG/DL (ref 1.6–2.6)
MCH RBC QN AUTO: 31 PG (ref 25.2–33.5)
MCHC RBC AUTO-ENTMCNC: 31.5 G/DL (ref 28.4–34.8)
MCV RBC AUTO: 98.4 FL (ref 82.6–102.9)
NRBC AUTOMATED: 0 PER 100 WBC
PDW BLD-RTO: 13 % (ref 11.8–14.4)
PLATELET # BLD: 207 K/UL (ref 138–453)
PMV BLD AUTO: 9.1 FL (ref 8.1–13.5)
POTASSIUM SERPL-SCNC: 3.2 MMOL/L (ref 3.7–5.3)
RBC # BLD: 3.19 M/UL (ref 4.21–5.77)
SODIUM BLD-SCNC: 141 MMOL/L (ref 135–144)
SPECIMEN DESCRIPTION: ABNORMAL
WBC # BLD: 8.4 K/UL (ref 3.5–11.3)

## 2020-07-13 PROCEDURE — 6360000002 HC RX W HCPCS: Performed by: FAMILY MEDICINE

## 2020-07-13 PROCEDURE — 85027 COMPLETE CBC AUTOMATED: CPT

## 2020-07-13 PROCEDURE — 83735 ASSAY OF MAGNESIUM: CPT

## 2020-07-13 PROCEDURE — 99222 1ST HOSP IP/OBS MODERATE 55: CPT | Performed by: PSYCHIATRY & NEUROLOGY

## 2020-07-13 PROCEDURE — 92526 ORAL FUNCTION THERAPY: CPT

## 2020-07-13 PROCEDURE — 6370000000 HC RX 637 (ALT 250 FOR IP): Performed by: PSYCHIATRY & NEUROLOGY

## 2020-07-13 PROCEDURE — 2580000003 HC RX 258: Performed by: FAMILY MEDICINE

## 2020-07-13 PROCEDURE — 94640 AIRWAY INHALATION TREATMENT: CPT

## 2020-07-13 PROCEDURE — 36415 COLL VENOUS BLD VENIPUNCTURE: CPT

## 2020-07-13 PROCEDURE — 94003 VENT MGMT INPAT SUBQ DAY: CPT

## 2020-07-13 PROCEDURE — 94770 HC ETCO2 MONITOR DAILY: CPT

## 2020-07-13 PROCEDURE — 6370000000 HC RX 637 (ALT 250 FOR IP): Performed by: FAMILY MEDICINE

## 2020-07-13 PROCEDURE — 80048 BASIC METABOLIC PNL TOTAL CA: CPT

## 2020-07-13 PROCEDURE — 2700000000 HC OXYGEN THERAPY PER DAY

## 2020-07-13 PROCEDURE — 6370000000 HC RX 637 (ALT 250 FOR IP): Performed by: INTERNAL MEDICINE

## 2020-07-13 PROCEDURE — 94761 N-INVAS EAR/PLS OXIMETRY MLT: CPT

## 2020-07-13 PROCEDURE — 2000000000 HC ICU R&B

## 2020-07-13 RX ORDER — POTASSIUM CHLORIDE 20 MEQ/1
40 TABLET, EXTENDED RELEASE ORAL PRN
Status: DISCONTINUED | OUTPATIENT
Start: 2020-07-13 | End: 2020-07-19 | Stop reason: HOSPADM

## 2020-07-13 RX ORDER — AMITRIPTYLINE HYDROCHLORIDE 25 MG/1
12.5 TABLET, FILM COATED ORAL NIGHTLY
Status: DISCONTINUED | OUTPATIENT
Start: 2020-07-13 | End: 2020-07-15

## 2020-07-13 RX ORDER — POTASSIUM CHLORIDE 7.45 MG/ML
10 INJECTION INTRAVENOUS PRN
Status: DISCONTINUED | OUTPATIENT
Start: 2020-07-13 | End: 2020-07-19 | Stop reason: HOSPADM

## 2020-07-13 RX ADMIN — IPRATROPIUM BROMIDE AND ALBUTEROL SULFATE 1 AMPULE: .5; 3 SOLUTION RESPIRATORY (INHALATION) at 08:09

## 2020-07-13 RX ADMIN — PIPERACILLIN AND TAZOBACTAM 3.38 G: 3; .375 INJECTION, POWDER, LYOPHILIZED, FOR SOLUTION INTRAVENOUS at 18:25

## 2020-07-13 RX ADMIN — IPRATROPIUM BROMIDE AND ALBUTEROL SULFATE 1 AMPULE: .5; 3 SOLUTION RESPIRATORY (INHALATION) at 15:18

## 2020-07-13 RX ADMIN — IPRATROPIUM BROMIDE AND ALBUTEROL SULFATE 1 AMPULE: .5; 3 SOLUTION RESPIRATORY (INHALATION) at 19:47

## 2020-07-13 RX ADMIN — PIPERACILLIN AND TAZOBACTAM 3.38 G: 3; .375 INJECTION, POWDER, LYOPHILIZED, FOR SOLUTION INTRAVENOUS at 01:30

## 2020-07-13 RX ADMIN — IPRATROPIUM BROMIDE AND ALBUTEROL SULFATE 1 AMPULE: .5; 3 SOLUTION RESPIRATORY (INHALATION) at 10:18

## 2020-07-13 RX ADMIN — VANCOMYCIN HYDROCHLORIDE 1000 MG: 1 INJECTION, POWDER, LYOPHILIZED, FOR SOLUTION INTRAVENOUS at 05:51

## 2020-07-13 RX ADMIN — POTASSIUM CHLORIDE 40 MEQ: 20 TABLET, EXTENDED RELEASE ORAL at 06:08

## 2020-07-13 RX ADMIN — AMITRIPTYLINE HYDROCHLORIDE 12.5 MG: 25 TABLET, FILM COATED ORAL at 21:11

## 2020-07-13 RX ADMIN — VANCOMYCIN HYDROCHLORIDE 1000 MG: 1 INJECTION, POWDER, LYOPHILIZED, FOR SOLUTION INTRAVENOUS at 13:05

## 2020-07-13 RX ADMIN — PIPERACILLIN AND TAZOBACTAM 3.38 G: 3; .375 INJECTION, POWDER, LYOPHILIZED, FOR SOLUTION INTRAVENOUS at 09:17

## 2020-07-13 RX ADMIN — ENOXAPARIN SODIUM 40 MG: 40 INJECTION SUBCUTANEOUS at 09:17

## 2020-07-13 ASSESSMENT — PULMONARY FUNCTION TESTS
PIF_VALUE: 21
PIF_VALUE: 33
PIF_VALUE: 24
PIF_VALUE: 29
PIF_VALUE: 22

## 2020-07-13 NOTE — FLOWSHEET NOTE
Patient in bed watching TV; unable to speak due to tracheostomy; request prayer via writing on clipboard. Writer provides presence, support, and prayer. Patient expresses gratitude; denies any further needs at this time. Spiritual Care will follow as needed. 07/13/20 1032   Encounter Summary   Services provided to: Patient   Referral/Consult From: [place]   Continue Visiting   (7/13/20)   Complexity of Encounter Moderate   Length of Encounter 15 minutes   Spiritual Assessment Completed Yes   Routine   Type Follow up   Spiritual/Druze   Type Spiritual support   Assessment Calm; Approachable   Intervention Active listening;Prayer   Outcome Expressed gratitude

## 2020-07-13 NOTE — CONSULTS
[address]  [facility], [address], [address]  Ph: [phone] or [phone]  FAX: [phone]        Reason for consult:  ALS    I had the pleasure of seeing your patient in neurology consultation for his symptoms. As you would recall Luis Carrion is a 53 y.o. yo male admitted on 7/9/2020. The history has been obtained from the patient's medical record and from with outpatient neurology records. The patient has a past medical history of limb onset amyotrophic lateral sclerosis with subsequent speech dysarthria, gait imbalance. Patient is on Rilutek 50 mg BID without complaints. At baseline, the patient is on BiPAP. His MRI cervical spine has shown multilevel DJD. The patient underwent tracheostomy almost 2 weeks ago. He has been admitted due to ongoing respiratory failure.   Past Medical History:   Diagnosis Date    [facility]otrophic lateral sclerosis (ALS) ([facility] [address]     Back pain     on 10/18/19 pt denies pain mgmnt    Degenerative disc disease      Past Surgical History:   Procedure Laterality Date    APPENDECTOMY      COLONOSCOPY  2017    CYSTOSCOPY  10/05/2016    LITHOTRIPSY Right 10/19/2016    LUMBAR FUSION  05/30/2018    LUMBAR INTERBODY FUSION POSTERIOR L3-4    CO LUMBAR SPINE FUSN,POST INTRBDY N/A 5/30/2018    LUMBAR INTERBODY FUSION POSTERIOR L3-4,  ([address] - OFFICE TO NOTIFY, ROTATING AARON TABLE, C-ARM) performed by Ashely Taylor MD at [address] N/A 6/26/2020    TRACHEOSTOMY PERCUTANEOUS performed by Park Babin MD at [address] Marital status:      Spouse name: Not on file    Number of children: Not on file    Years of education: Not on file    Highest education level: Not on file   Occupational History    Not on file   Social Needs    Financial resource strain: Not on file    Food insecurity     Worry: Not on file     Inability: Not on file   [facility] intending to generate a document that actually reflects the content of the visit, the document can still have some errors including those of syntax and sound a- like substitutions which may escape proofreading. In such instances, actual meaning can be extrapolated by contextual derivation.

## 2020-07-13 NOTE — PROGRESS NOTES
Speech Language Pathology  Speech Language Pathology  [address] [facility]    Dysphagia Treatment Note    Date: 7/13/2020  Patients Name: Ubaldo Onofre  MRN: [id number]  Diagnosis:  Patient Active Problem List   Diagnosis Code    ureteral calculus ,right N13.30    Bradycardia R00.1    Renal calculus, right N20.0    Lumbar stenosis with neurogenic claudication M48.062    Respiratory failure ([address] J96.90    Acute respiratory failure with hypercapnia ([address] J96.02    Neuromuscular disorder ([address] G70.9    Severe malnutrition ([address] E43    Atelectasis J98.11    Tracheobronchitis J40    Acute respiratory failure ([address] J96.00    Fever R50.9    Ventilator associated pneumonia ([address] J95.851       Pain: denies    Dysphagia Treatment  Treatment time: 1085-0264    Subjective: [x] Alert [x] Cooperative     [] Confused     [] Agitated    [] Lethargic    Objective/Assessment:    Pt seen for diet tolerance monitoring with lunch tray. Pt seen 7- for a MBSS, at which time a Regular diet with thin liquids was recommended. Pt & pt's wife report pt with decreased appetite, and pt refused solid trials. However, pt with no overt s/s of aspiration observed with x5 sips thin liquid by straw. Pt provided education re: safe swallowing strategies & pt verbalized understanding. Plan:  [x] Continue ST services    [] Discharge from ST:      Discharge recommendations: Further therapy recommended at discharge. Treatment completed by:  Rah Dacosta M.S. [id number] [facility]

## 2020-07-13 NOTE — PROGRESS NOTES
home trilogy machine  · Consult neurology for worsening neuromuscular weakness  · Trach care  · DVT prophylaxis with low molecular weight heparin  · Will follow with you    Suze Alcantara MD, [facility]  Pulmonary Critical Care and Sleep Medicine,  [facility]  Cell: [phone]  Office: [phone]

## 2020-07-14 ENCOUNTER — APPOINTMENT (OUTPATIENT)
Dept: GENERAL RADIOLOGY | Age: 55
DRG: 870 | End: 2020-07-14
Payer: MEDICARE

## 2020-07-14 LAB
ABSOLUTE EOS #: 0.27 K/UL (ref 0–0.4)
ABSOLUTE IMMATURE GRANULOCYTE: 0 K/UL (ref 0–0.3)
ABSOLUTE LYMPH #: 0.47 K/UL (ref 1–4.8)
ABSOLUTE MONO #: 0.6 K/UL (ref 0.2–0.8)
ANION GAP SERPL CALCULATED.3IONS-SCNC: 10 MMOL/L (ref 9–17)
BASOPHILS # BLD: 1 %
BASOPHILS ABSOLUTE: 0.07 K/UL (ref 0–0.2)
BUN BLDV-MCNC: 7 MG/DL (ref 6–20)
BUN/CREAT BLD: 15 (ref 9–20)
CALCIUM SERPL-MCNC: 8.8 MG/DL (ref 8.6–10.4)
CASE NUMBER:: NORMAL
CHLORIDE BLD-SCNC: 101 MMOL/L (ref 98–107)
CO2: 31 MMOL/L (ref 20–31)
CREAT SERPL-MCNC: 0.47 MG/DL (ref 0.7–1.2)
DIFFERENTIAL TYPE: ABNORMAL
EOSINOPHILS RELATIVE PERCENT: 4 % (ref 1–4)
GFR AFRICAN AMERICAN: >60 ML/MIN
GFR NON-AFRICAN AMERICAN: >60 ML/MIN
GFR SERPL CREATININE-BSD FRML MDRD: ABNORMAL ML/MIN/{1.73_M2}
GFR SERPL CREATININE-BSD FRML MDRD: ABNORMAL ML/MIN/{1.73_M2}
GLUCOSE BLD-MCNC: 91 MG/DL (ref 70–99)
HCT VFR BLD CALC: 31.2 % (ref 40.7–50.3)
HEMOGLOBIN: 9.8 G/DL (ref 13–17)
IMMATURE GRANULOCYTES: 0 %
LYMPHOCYTES # BLD: 7 % (ref 24–44)
MCH RBC QN AUTO: 30.8 PG (ref 25.2–33.5)
MCHC RBC AUTO-ENTMCNC: 31.4 G/DL (ref 28.4–34.8)
MCV RBC AUTO: 98.1 FL (ref 82.6–102.9)
MONOCYTES # BLD: 9 % (ref 1–7)
NRBC AUTOMATED: 0 PER 100 WBC
PDW BLD-RTO: 12.9 % (ref 11.8–14.4)
PLATELET # BLD: 198 K/UL (ref 138–453)
PLATELET ESTIMATE: ABNORMAL
PMV BLD AUTO: 9 FL (ref 8.1–13.5)
POTASSIUM SERPL-SCNC: 3.7 MMOL/L (ref 3.7–5.3)
RBC # BLD: 3.18 M/UL (ref 4.21–5.77)
RBC # BLD: ABNORMAL 10*6/UL
SEG NEUTROPHILS: 79 % (ref 36–66)
SEGMENTED NEUTROPHILS ABSOLUTE COUNT: 5.29 K/UL (ref 1.8–7.7)
SODIUM BLD-SCNC: 142 MMOL/L (ref 135–144)
SPECIMEN DESCRIPTION: NORMAL
WBC # BLD: 6.7 K/UL (ref 3.5–11.3)
WBC # BLD: ABNORMAL 10*3/UL

## 2020-07-14 PROCEDURE — 6370000000 HC RX 637 (ALT 250 FOR IP): Performed by: PSYCHIATRY & NEUROLOGY

## 2020-07-14 PROCEDURE — 80048 BASIC METABOLIC PNL TOTAL CA: CPT

## 2020-07-14 PROCEDURE — 87102 FUNGUS ISOLATION CULTURE: CPT

## 2020-07-14 PROCEDURE — 94003 VENT MGMT INPAT SUBQ DAY: CPT

## 2020-07-14 PROCEDURE — 6360000002 HC RX W HCPCS: Performed by: FAMILY MEDICINE

## 2020-07-14 PROCEDURE — 2700000000 HC OXYGEN THERAPY PER DAY

## 2020-07-14 PROCEDURE — 94640 AIRWAY INHALATION TREATMENT: CPT

## 2020-07-14 PROCEDURE — 94761 N-INVAS EAR/PLS OXIMETRY MLT: CPT

## 2020-07-14 PROCEDURE — 2580000003 HC RX 258: Performed by: FAMILY MEDICINE

## 2020-07-14 PROCEDURE — 87206 SMEAR FLUORESCENT/ACID STAI: CPT

## 2020-07-14 PROCEDURE — 2000000000 HC ICU R&B

## 2020-07-14 PROCEDURE — 87070 CULTURE OTHR SPECIMN AEROBIC: CPT

## 2020-07-14 PROCEDURE — 87205 SMEAR GRAM STAIN: CPT

## 2020-07-14 PROCEDURE — 36415 COLL VENOUS BLD VENIPUNCTURE: CPT

## 2020-07-14 PROCEDURE — 6360000002 HC RX W HCPCS

## 2020-07-14 PROCEDURE — 85025 COMPLETE CBC W/AUTO DIFF WBC: CPT

## 2020-07-14 PROCEDURE — 0B9J8ZZ DRAINAGE OF LEFT LOWER LUNG LOBE, VIA NATURAL OR ARTIFICIAL OPENING ENDOSCOPIC: ICD-10-PCS | Performed by: INTERNAL MEDICINE

## 2020-07-14 PROCEDURE — 31622 DX BRONCHOSCOPE/WASH: CPT

## 2020-07-14 PROCEDURE — 87116 MYCOBACTERIA CULTURE: CPT

## 2020-07-14 PROCEDURE — 89051 BODY FLUID CELL COUNT: CPT

## 2020-07-14 PROCEDURE — 88312 SPECIAL STAINS GROUP 1: CPT

## 2020-07-14 PROCEDURE — 6370000000 HC RX 637 (ALT 250 FOR IP): Performed by: FAMILY MEDICINE

## 2020-07-14 PROCEDURE — 87015 SPECIMEN INFECT AGNT CONCNTJ: CPT

## 2020-07-14 PROCEDURE — 6360000002 HC RX W HCPCS: Performed by: INTERNAL MEDICINE

## 2020-07-14 PROCEDURE — 88112 CYTOPATH CELL ENHANCE TECH: CPT

## 2020-07-14 PROCEDURE — 71045 X-RAY EXAM CHEST 1 VIEW: CPT

## 2020-07-14 RX ORDER — FENTANYL CITRATE 50 UG/ML
INJECTION, SOLUTION INTRAMUSCULAR; INTRAVENOUS
Status: DISCONTINUED
Start: 2020-07-14 | End: 2020-07-14 | Stop reason: WASHOUT

## 2020-07-14 RX ORDER — MIDAZOLAM HYDROCHLORIDE 1 MG/ML
INJECTION INTRAMUSCULAR; INTRAVENOUS
Status: COMPLETED
Start: 2020-07-14 | End: 2020-07-14

## 2020-07-14 RX ORDER — MIDAZOLAM HYDROCHLORIDE 1 MG/ML
2 INJECTION INTRAMUSCULAR; INTRAVENOUS ONCE
Status: COMPLETED | OUTPATIENT
Start: 2020-07-14 | End: 2020-07-14

## 2020-07-14 RX ORDER — MIDAZOLAM HYDROCHLORIDE 1 MG/ML
INJECTION INTRAMUSCULAR; INTRAVENOUS
Status: COMPLETED | OUTPATIENT
Start: 2020-07-14 | End: 2020-07-14

## 2020-07-14 RX ORDER — MIDAZOLAM HYDROCHLORIDE 1 MG/ML
4 INJECTION INTRAMUSCULAR; INTRAVENOUS ONCE
Status: COMPLETED | OUTPATIENT
Start: 2020-07-14 | End: 2020-07-14

## 2020-07-14 RX ORDER — FENTANYL CITRATE 50 UG/ML
100 INJECTION, SOLUTION INTRAMUSCULAR; INTRAVENOUS ONCE
Status: COMPLETED | OUTPATIENT
Start: 2020-07-14 | End: 2020-07-14

## 2020-07-14 RX ADMIN — SODIUM CHLORIDE: 9 INJECTION, SOLUTION INTRAVENOUS at 12:58

## 2020-07-14 RX ADMIN — PIPERACILLIN AND TAZOBACTAM 3.38 G: 3; .375 INJECTION, POWDER, LYOPHILIZED, FOR SOLUTION INTRAVENOUS at 20:35

## 2020-07-14 RX ADMIN — PIPERACILLIN AND TAZOBACTAM 3.38 G: 3; .375 INJECTION, POWDER, LYOPHILIZED, FOR SOLUTION INTRAVENOUS at 12:57

## 2020-07-14 RX ADMIN — MIDAZOLAM HYDROCHLORIDE 2 MG: 1 INJECTION INTRAMUSCULAR; INTRAVENOUS at 12:02

## 2020-07-14 RX ADMIN — FENTANYL CITRATE 50 MCG: 50 INJECTION, SOLUTION INTRAMUSCULAR; INTRAVENOUS at 11:47

## 2020-07-14 RX ADMIN — MIDAZOLAM HYDROCHLORIDE 4 MG: 2 INJECTION, SOLUTION INTRAMUSCULAR; INTRAVENOUS at 11:47

## 2020-07-14 RX ADMIN — FENTANYL CITRATE 50 MCG: 50 INJECTION, SOLUTION INTRAMUSCULAR; INTRAVENOUS at 11:59

## 2020-07-14 RX ADMIN — MIDAZOLAM 2 MG: 1 INJECTION INTRAMUSCULAR; INTRAVENOUS at 11:59

## 2020-07-14 RX ADMIN — IPRATROPIUM BROMIDE AND ALBUTEROL SULFATE 1 AMPULE: .5; 3 SOLUTION RESPIRATORY (INHALATION) at 19:37

## 2020-07-14 RX ADMIN — PIPERACILLIN AND TAZOBACTAM 3.38 G: 3; .375 INJECTION, POWDER, LYOPHILIZED, FOR SOLUTION INTRAVENOUS at 01:53

## 2020-07-14 RX ADMIN — AMITRIPTYLINE HYDROCHLORIDE 12.5 MG: 25 TABLET, FILM COATED ORAL at 22:04

## 2020-07-14 RX ADMIN — ENOXAPARIN SODIUM 40 MG: 40 INJECTION SUBCUTANEOUS at 10:00

## 2020-07-14 RX ADMIN — MIDAZOLAM HYDROCHLORIDE 2 MG: 2 INJECTION, SOLUTION INTRAMUSCULAR; INTRAVENOUS at 12:02

## 2020-07-14 RX ADMIN — IPRATROPIUM BROMIDE AND ALBUTEROL SULFATE 1 AMPULE: .5; 3 SOLUTION RESPIRATORY (INHALATION) at 07:54

## 2020-07-14 ASSESSMENT — PULMONARY FUNCTION TESTS
PIF_VALUE: 18
PIF_VALUE: 19
PIF_VALUE: 17
PIF_VALUE: 20
PIF_VALUE: 18

## 2020-07-14 ASSESSMENT — PAIN SCALES - GENERAL: PAINLEVEL_OUTOF10: 0

## 2020-07-14 NOTE — PROGRESS NOTES
Lung fields improved since bronchoscopy. Lungs with a few faint scattered rhonchi anteriorly. Respirations non labored and even.

## 2020-07-14 NOTE — PROGRESS NOTES
Transitions of Care Pharmacy Service   Medication Review    The patient's list of current home medications has been reviewed. Source(s) of information: patient, Delvin (Dee/[place]), Yonatan    Based on information provided by the above source(s), I have updated the patient's home med list as described below. I changed or updated the following medications on the patient's home medication list:  Discontinued None      Added Betamethasone 0.1% Cream - AAA BID     Adjusted   None      Other Notes Betamethasone 0.1% Cream - Prescription picked up 07/13/20 - patient hasn't started using            Please feel free to call me with any questions about this encounter. Thank you. This note will be reviewed and co-signed by the Transitions of Care Pharmacist. The pharmacist will review inpatient orders and contact the physician about any discrepancies. Shannan Hector, pharmacy technician  Transitions [facility] Pharmacy Service  Phone:  [phone]  Fax: [phone]      Electronically signed by Shannan Hector on 7/14/2020 at 11:58 AM     Prior to Admission medications    Medication Sig Start Date End Date Taking? Authorizing Provider   betamethasone valerate (VALISONE) 0.1 % cream Apply topically 2 times daily Apply topically 2 times daily.        riluzole (RILUTEK) 50 MG tablet Take 50 mg by mouth every 12 hours

## 2020-07-14 NOTE — PROGRESS NOTES
Pulmonary Critical Care Progress Note  Renetta Bay MD     Patient seen for the follow up of Acute on chronic hypoxic and hypercarbic respiratory failure, pneumonia, aspiration, ALS, remote history of smoking    Subjective:  Patient had uneventful night. He is still is feeling short of breath being on the ventilator. Positive tracheal secretions. He denies any chest pain. He is tolerating diet. His mother is present at the bedside. Examination:  Vitals: /85   Pulse 96   Temp 98.6 °F (37 °C) (Axillary)   Resp 21   Ht 5' 10\" (1.778 m)   Wt 129 lb 1.6 oz (58.6 kg)   SpO2 95%   BMI 18.52 kg/m²   General appearance:  alert and cooperative with exam  Neck: No JVD  Lungs: Basal crackles, moderate air exchange  Heart: regular rate and rhythm, S1, S2 normal, no gallop  Abdomen: Soft, non tender, + BS  Extremities: no cyanosis or clubbing. No significant edema    LABs:  CBC:   Recent Labs     07/13/20  0421 07/14/20  0502   WBC 8.4 6.7   HGB 9.9* 9.8*   HCT 31.4* 31.2*    198     BMP:   Recent Labs     07/13/20  0421 07/14/20  0502    142   K 3.2* 3.7   CO2 31 31   BUN 7 7   CREATININE 0.45* 0.47*   LABGLOM >60 >60   GLUCOSE 96 91       Lab Results   Component Value Date    POCPH 7.39 07/10/2020    POCPCO2 43 07/10/2020    POCPO2 92 07/10/2020    POCHCO3 25.7 07/10/2020    NBEA NOT REPORTED 07/10/2020    PBEA 1 07/10/2020    DMG8SBE 27 07/10/2020    CASZ3DJY 97 07/10/2020    FIO2 NOT REPORTED 07/10/2020     Radiology:  7/10/2020      Impression:  · Acute on chronic hypoxic and hypercarbic respiratory failure  · LLL pneumonia  · Aspiration  · ALS s/p recent trach  · ~10-pack-year smoking, during his 20s    Recommendations:  · Continue IV antibiotics, zoysyn, vancomycin  · IV fluids  · Albuterol and Ipratropium Q 4 hours and prn  · We will proceed with bronchoscopy for left lower lobe atelectasis/infiltrate/mucous plugging. Discussed with patient and his mother and consent obtained.   · X-ray chest in am  · Labs: CBC and BMP in am  · Vent support, start switching it to home trilogy machine  · Consult neurology for worsening neuromuscular weakness  · Trach care  · DVT prophylaxis with low molecular weight heparin  · Will follow with you    Liliana Galo MD, [facility]  Pulmonary Critical Care and Sleep Medicine,  [facility]  Cell: [phone]  Office: [phone]

## 2020-07-14 NOTE — PLAN OF CARE
Problem: Skin Integrity:  Goal: Will show no infection signs and symptoms  Description: Will show no infection signs and symptoms  Outcome: Ongoing  Goal: Absence of new skin breakdown  Description: Absence of new skin breakdown  Outcome: Ongoing     Problem: Falls - Risk of:  Goal: Will remain free from falls  Description: Will remain free from falls  Outcome: Ongoing  Goal: Absence of physical injury  Description: Absence of physical injury  Outcome: Ongoing     Problem: Nutrition  Goal: Optimal nutrition therapy  Outcome: Ongoing    No acute events this shift through the timing of this note. Vitals and assessments as charted. Safety precautions remain in place. Will continue to monitor and notify if there are any changes in this patient's condition. no

## 2020-07-14 NOTE — OP NOTE
Operative Note  Procedure Note: Bronchoscopy  Shayne Carrillo MD     Pre-op Diagnosis: Mucous plug  Post-op Diagnosis: Mucous plugging  Bronchoscopist: Shayne Carrillo MD  Anesthesia: Conscious Sedation. Total Dose: Versed -4 mgs  Fentanyl -100 micrograms  Procedure: Flexible fiberoptic bronchoscopy    Indications and History  The patient is a 53 y.o. male with mucous plugging, ALS, pulmonary infiltrate. (Please see today's progress notes for the latest issues,  physical exam and lab data)    Consent to Procedure  The risks, benefits, complications, treatment options and expected outcomes were discussed with the patient. The possibilities of reaction to medication, pulmonary aspiration, perforation of a viscus, bleeding, failure to diagnose a condition and creating a complication requiring transfusion or operation were discussed with the patient, who freely signed the consent. Description of Procedure  The patient was intubated on mechanical ventilation and placed on 100% oxygen. [place] was monitored by the Critical Nursing and Respiratory therapy staff and the standard ICU monitoring devices. [place] D[place] and the procedure were verified as Flexible Fiberoptic Bronchoscopy. A Time Out was held and the above information confirmed. The bronchoscope was then passed into the trachea via the ET tube. After careful inspection of the tracheal, the bronchoscope was sequentially passed into all segments of right and left endobronchial trees to the second and/or third divisions. Endobronchial findings  Distal trachea: relatively normal with no significant lesion. CarinaSanders Divine and mobile with no mass. Right endobronchial trees: The endobronchial survey was significant for   · No mass  · No bleeding  · Moderate inflammation  · No significant bronchomalcia  · Large amount of thick dark red mucous plugging    Left endobronchial trees:  The endobronchial survey was significant for   · No mass  · No

## 2020-07-14 NOTE — PROGRESS NOTES
.. PALLIATIVE CARE NURSING ASSESSMENT    Patient: Ena Bliss  Room: [facility]0/1110-    Reason For Consult   Goals of care evaluation  Distress management  Guidance and support  Facilitate communications  Assistance in coordinating care      Impression: Ena Bliss is a 53y.o. year old male  has a past medical history of Amyotrophic lateral sclerosis (ALS) ([address] Back pain, and Degenerative disc disease. .  Currently hospitalized for the management of pneumonia. The Palliative Care Team is following to assist with chronic disease support. Vital Signs  Blood pressure 131/85, pulse 96, temperature 98.6 °F (37 °C), temperature source Axillary, resp. rate 21, height 5' 10\" (1.778 m), weight 129 lb 1.6 oz (58.6 kg), SpO2 95 %. Patient Active Problem List   Diagnosis    ureteral calculus ,right    Bradycardia    Renal calculus, right    Lumbar stenosis with neurogenic claudication    Respiratory failure (HCC)    Acute respiratory failure with hypercapnia (HCC)    Neuromuscular disorder (HCC)    Severe malnutrition (HCC)    Atelectasis    Tracheobronchitis    Acute respiratory failure (HCC)    Fever    Ventilator associated pneumonia (HCC)       Palliative Interaction: Pt sitting in bed. Alert and oriented but communicated by writing. Pt's mother Doug is at bedside. I introduced myself and the palliative role. I explained the difference between palliative care and hospice. Pt states he does have pain, rates it 4/10. He states he has not be receiving any pain medicine. After explanation, patient is interested in palliative care. I asked if I could call his wife to explain and he states yes. Wife will not be in today. I called wife Sakina Tineo on the phone. I explained to her what palliative care is and how it can benefit patient. Wife is agreeable. I did explain that palliative care is not hospice and explained the differences. Wife states, \"good, because he does NOT and never will want hospice\".

## 2020-07-14 NOTE — PROGRESS NOTES
Patient requires much encouragement to reposition. Patient is reluctant to allow others to assist. Stressed the importance of position changes due to deep tissue injury that is present in coccygeal area. Injury was noted as pre-existing on last admission as well.

## 2020-07-14 NOTE — CARE COORDINATION
Discharge planning    Chart reviewed. Patient remains at 39 FIO2 on his current trilogy vent from home     CM  He has ALS and his trilogy NIV was changed to Invasive vent last admission.      In home wife has 02 all the time, vent, suction machine. [facility] has set up with trach supplies for the next month. [facility] following for ensure, awaiting PA. Will use [facility] on discharge with his trilogy vent.      Profilepasser porfirio is sydni at [phone]. NEURO  Assessment and Recommendations:   Limb onset of amyotrophic lateral sclerosis  Status post tracheostomy  Left lower lobe pneumonia     I would add on amitriptyline 12.5 mg p.o. nightly to decrease oral secretions     The patient has advanced symptoms of ALS. Long-term prognosis is guarded and at this time, there is no significant pharmacological option for the treatment     May consider palliative care consult    PULM  Impression:  · Acute on chronic hypoxic and hypercarbic respiratory failure  · LLL pneumonia  · Aspiration  · ALS s/p recent trach    Recommendations:  · Continue IV antibiotics, zoysyn, vancomycin  · IV fluids  · Swallow study, This p.m.   · Patient may need bronch for mucous plugging  · Vent support, start switching it to home trilogy machine

## 2020-07-14 NOTE — PROGRESS NOTES
07/14/20 0545   Surgical Airway (trach) Shiley Cuffed   Placement Date/Time: 06/26/20 1559   Surgical Airway Type: Tracheostomy  Brand: Travis  Style: Cuffed  Size (mm): 6  Placement Verified By[de-identified] Capnometry   Status Secured   Site Assessment Clean;Dry   Site Care Cleansed;Dried   Inner Cannula Care Changed/new  (t-care perfromed, Rn at bedside)   Ties Assessment Secure; Intact

## 2020-07-14 NOTE — PROGRESS NOTES
[address] Hospitalist        2020   7:51 PM    Name:  Марина Flores  MRN:    [id number]     [place]:     [de-identified]   Room:  [address] [place] [address] Day: 5     Admit Date: 2020 11:34 AM  PCP: Esthela Hermosillo MD    C/C:   Chief Complaint   Patient presents with    Shortness of Breath     yesterday       Assessment:      ·  aspiration pneumonia  ·  acute on Chronic hypoxic respiratory failure, status post tracheostomy  · Sepsis   · Left lower lobe pneumonia  · Amyotrophic lateral sclerosis  · Moderate malnourishment  · Tobacco use        Plan:        ·  patient is currently in ICU  · Mechanical ventilation as per Critical Care  · Patient   Got bedside bronchoscopy on 2020   · Neurology consult for a LS   · IV fluids  · Palliative care following for pain control  · IV Zosyn   · Pulmonology following  · DVT and GI prophylaxis  · Continue to monitor/telemetry/CBC with differential daily/BMP daily      Subjective:     Patient seen and examined at bedside. patient usually talks by writing. Currently patient is sedated and getting ready for bronchoscopy. Discussed with RN. No acute events overnight. Patient has been evaluated by palliative care, he does not want hospice care at this point but would like him to see him at home for pain control/symptom management. Afebrile      ROS:  A 10 point system reviewed and negative otherwise mentioned above. Physical Examination:      Vitals:  /71   Pulse 62   Temp 97.6 °F (36.4 °C) (Axillary)   Resp 17   Ht 5' 10\" (1.778 m)   Wt 129 lb 1.6 oz (58.6 kg)   SpO2 98%   BMI 18.52 kg/m²   Temp (24hrs), Av.3 °F (36.8 °C), Min:97.6 °F (36.4 °C), Max:98.8 °F (37.1 °C)    Weight:   Wt Readings from Last 3 Encounters:   07/10/20 129 lb 1.6 oz (58.6 kg)   20 134 lb (60.8 kg)   20 130 lb (59 kg)     I/O last 3 completed shifts:  I/O last 3 completed shifts:   In: 925 [I.V.:825; IV Piggyback:100]  Out: 8885 [AOIAU:5282]     No results for input(s): POCGLU in the last 72 hours. General appearance -   Sleepy  Mental status - unable to assess  Head - normocephalic and atraumatic  Eyes - pupils equal and reactive, extraocular eye movements intact, conjunctiva clear  Ears - hearing appears to be intact  Nose - no drainage noted  Mouth - mucous membranes moist  Neck -  tracheostomy  Chest - clear to auscultation, normal effort  Heart - normal rate, regular rhythm, no murmur  Abdomen - soft, nontender, nondistended, bowel sounds present all four quadrants, no masses, hepatomegaly or splenomegaly  Neurological - normal speech, no focal findings or movement disorder noted, cranial nerves II through XII grossly intact  Extremities - peripheral pulses palpable, no pedal edema or calf pain with palpation  Skin - no gross lesions, rashes, or induration noted        Medications: Allergies:    Allergies   Allergen Reactions    Sulfa Antibiotics Other (See Comments)     As child (unknown reaction)       Current Meds:   Current Facility-Administered Medications:     potassium chloride (KLOR-CON M) extended release tablet 40 mEq, 40 mEq, Oral, PRN, 40 mEq at 07/13/20 0608 **OR** potassium bicarb-citric acid (EFFER-K) effervescent tablet 40 mEq, 40 mEq, Oral, PRN **OR** potassium chloride 10 mEq/100 mL IVPB (Peripheral Line), 10 mEq, Intravenous, PRN, Sigifredo Gomez MD    amitriptyline (ELAVIL) tablet 12.5 mg, 12.5 mg, Oral, Nightly, Shellie Holden MD, 12.5 mg at 07/13/20 2111    morphine sulfate (PF) injection 4 mg, 4 mg, Intravenous, Q4H PRN, Zeynep Painter MD    HYDROcodone-acetaminophen (NORCO) 5-325 MG per tablet 1 tablet, 1 tablet, Oral, Q4H PRN, Zeynep Painter MD    0.9 % sodium chloride infusion, , Intravenous, Continuous, Zeynep Painter MD, Last Rate: 75 mL/hr at 07/14/20 1258    sodium chloride flush 0.9 % injection 10 mL, 10 mL, Intravenous, 2 times per day, Jacki Raza MD, Stopped at 07/10/20 2103    sodium LABGGT, BILITOT, BILIDIR, AMMONIA, AMYLASE, LIPASE, LACTATE, CHOL, HDL, LDLCHOLESTEROL, CHOLHDLRATIO, TRIG, VLDL, WGI39WA, PHENYTOIN, PHENYF, URICACID, POCGLU in the last 72 hours. Lab Results   Component Value Date/Time    SPECIAL NOT REPORTED 07/14/2020 12:43 PM     Lab Results   Component Value Date/Time    CULTURE PENDING 07/14/2020 12:43 PM       Lab Results   Component Value Date    POCPH 7.39 07/10/2020    POCPCO2 43 07/10/2020    POCPO2 92 07/10/2020    POCHCO3 25.7 07/10/2020    NBEA NOT REPORTED 07/10/2020    PBEA 1 07/10/2020    RZQ1EIA 27 07/10/2020    MZJM3GOG 97 07/10/2020    FIO2 NOT REPORTED 07/10/2020       Radiology:    Xr Chest Portable    Result Date: 7/14/2020  Left basilar effusion with adjacent left basilar consolidation consistent with pneumonia. Xr Chest Portable    Result Date: 7/12/2020  Worsening left basilar opacity     Xr Chest Portable    Result Date: 7/10/2020  Small increased left lung base opacity is nonspecific and may represent atelectasis with pneumonia or aspiration not excluded. Xr Chest Portable    Result Date: 7/9/2020  Acute airspace disease at the left lung base concerning for pneumonia or aspiration. Radiographic follow-up is recommended. Ct Chest Pulmonary Embolism W Contrast    Result Date: 7/9/2020  1. Debris in the trachea predominantly extends into the left mainstem bronchus with associated consolidative opacity in the left lower lobe and inferior lingula, suggestive of aspiration pneumonitis. There is also debris in the right lower lobe segmental bronchi without associated airspace disease. 2.  No evidence for acute pulmonary embolism. Fl Modified Barium Swallow W Video    Result Date: 7/10/2020  1. Flash penetration with the thin liquid substance by cup without aspiration. 2. No penetration or aspiration with the remainder of the administered substances/attempts.  Please see separate speech pathology report for full discussion of findings and

## 2020-07-14 NOTE — PROGRESS NOTES
[address] Hospitalist        7/13/2020   8:40 PM    Name:  Rosario Morgan  MRN:    [id number]     [place]:     [de-identified]   Room:  [address] [place] [address] Day: 4     Admit Date: 7/9/2020 11:34 AM  PCP: Siena Haley MD    C/C:   Chief Complaint   Patient presents with    Shortness of Breath     yesterday       Assessment:      · Acute on chronic hypoxic and hypercarbic respiratory failure  · Aspiration pneumonia   · Sepsis present on admission  · Left lower lobe pneumonia  · Amyotrophic lateral sclerosis   · Recent tracheostomy  · DJD Lumbar spine   · Chronic back pain    · Nephrolithiasis   · Malnourishment   · Contact dermatitis - resolved   · Adjustment disorder  · Oral secretions         Plan:        · Admit to ICU  · Monitor vitals closely  · Keep SpO2 above 90%  · Mechanical ventilation through tracheostomy   · Is/ Os  · Daily weights   · RT eval  · Duo Neb prn  · IV fluids- NS at 75ml/h- reduce and wean off  · IV Antibiotics  · IV Vanco  · Pharmacy to dose  · IV Zosyn  · Anti emetics prn  · CXR in am  · Pulmonology consulted in ER  · NPO for now- advance to Dental soft if tolerates- advance  · Swallow study  · Lovenox for Px  · D/w RN  · DVT and GI prophylaxis  · Start morphine 4 mg IV every 4 hours as needed for moderate to severe pain  · Once taking p.o., may take Norco PRN- now PO  · Nutrition consulted   · D/w RN  · Need wife to be educated for [facility] at home by discharge  · On CPAP now  · TCA added per Neuro      Subjective:     Patient seen and examined at bedside. Tracheostomy fell out last night  Needed suctioning and proper placement   No acute complaints today. Pt feels a little better today  Afebrile  Communicates by writing  Tracheostomy with CPAP  Depressed       Pt. Denies any palpitation, HA, dizziness, chills, changes in urination, BM or skin changes    ROS:  A 10 point system reviewed and negative otherwise mentioned above.         Physical Examination: **OR** potassium chloride 10 mEq/100 mL IVPB (Peripheral Line), 10 mEq, Intravenous, PRN, Cholo Vazquez MD    amitriptyline (ELAVIL) tablet 12.5 mg, 12.5 mg, Oral, Nightly, Shivani Pabon MD    morphine sulfate (PF) injection 4 mg, 4 mg, Intravenous, Q4H PRN, Zeynep Painter MD    HYDROcodone-acetaminophen (NORCO) 5-325 MG per tablet 1 tablet, 1 tablet, Oral, Q4H PRN, Zeynep Painter MD    0.9 % sodium chloride infusion, , Intravenous, Continuous, Zeynep Painter MD, Last Rate: 75 mL/hr at 07/12/20 1345    sodium chloride flush 0.9 % injection 10 mL, 10 mL, Intravenous, 2 times per day, Vazquez Yousif MD, Stopped at 07/10/20 2103    sodium chloride flush 0.9 % injection 10 mL, 10 mL, Intravenous, PRN, Zeynep Painter MD    acetaminophen (TYLENOL) tablet 650 mg, 650 mg, Oral, Q6H PRN **OR** acetaminophen (TYLENOL) suppository 650 mg, 650 mg, Rectal, Q6H PRN, Zeynep Painter MD    magnesium hydroxide (MILK OF MAGNESIA) 400 MG/5ML suspension 30 mL, 30 mL, Oral, Daily PRN, Zeynep Painter MD    promethazine (PHENERGAN) tablet 12.5 mg, 12.5 mg, Oral, Q6H PRN **OR** ondansetron (ZOFRAN) injection 4 mg, 4 mg, Intravenous, Q6H PRN, Zeynep Painter MD    enoxaparin (LOVENOX) injection 40 mg, 40 mg, Subcutaneous, Daily, Zeynep Painter MD, 40 mg at 07/13/20 0917    albuterol (PROVENTIL) nebulizer solution 2.5 mg, 2.5 mg, Nebulization, Q2H PRN, Vazquez Yousif MD    ipratropium-albuterol (DUONEB) nebulizer solution 1 ampule, 1 ampule, Inhalation, Q4H WA, Zeynep Painter MD, 1 ampule at 07/13/20 1947    piperacillin-tazobactam (ZOSYN) 3.375 g in dextrose 5 % 50 mL IVPB extended infusion (mini-bag), 3.375 g, Intravenous, Q8H, Zeynep Painter MD, Last Rate: 12.5 mL/hr at 07/13/20 1825, 3.375 g at 07/13/20 1825      I/O (24Hr):     Intake/Output Summary (Last 24 hours) at 7/13/2020 2040  Last data filed at 7/13/2020 1800  Gross per 24 hour   Intake 1116.01 ml   Output 1900 ml   Net -783.99 ml       Data: segmental bronchi without associated airspace disease. 2.  No evidence for acute pulmonary embolism. Fl Modified Barium Swallow W Video    Result Date: 7/10/2020  1. Flash penetration with the thin liquid substance by cup without aspiration. 2. No penetration or aspiration with the remainder of the administered substances/attempts. Please see separate speech pathology report for full discussion of findings and recommendations. All radiological studies reviewed  Code Status:  DNR-CCA        Electronically signed by Sanam Miranda MD on 7/13/2020 at 8:40 PM    This note was created with the assistance of a speech-recognition program.  Although the intention is to generate a document that actually reflects the content of the visit, no guarantees can be provided that every mistake has been identified and corrected by editing. Note was updated later by me after  physical examination and  completion of the assessment.

## 2020-07-15 ENCOUNTER — APPOINTMENT (OUTPATIENT)
Dept: GENERAL RADIOLOGY | Age: 55
DRG: 870 | End: 2020-07-15
Payer: MEDICARE

## 2020-07-15 PROBLEM — E43 SEVERE MALNUTRITION (HCC): Chronic | Status: ACTIVE | Noted: 2020-07-15

## 2020-07-15 LAB
ANION GAP SERPL CALCULATED.3IONS-SCNC: 10 MMOL/L (ref 9–17)
APPEARANCE FLUID: NORMAL
BASO FLUID: NORMAL %
BUN BLDV-MCNC: 10 MG/DL (ref 6–20)
BUN/CREAT BLD: 20 (ref 9–20)
CALCIUM SERPL-MCNC: 9 MG/DL (ref 8.6–10.4)
CHLORIDE BLD-SCNC: 103 MMOL/L (ref 98–107)
CO2: 33 MMOL/L (ref 20–31)
COLOR FLUID: NORMAL
CREAT SERPL-MCNC: 0.51 MG/DL (ref 0.7–1.2)
CULTURE: NORMAL
CULTURE: NORMAL
DIRECT EXAM: NORMAL
EOSINOPHIL FLUID: NORMAL %
FLUID DIFF COMMENT: NORMAL
GFR AFRICAN AMERICAN: >60 ML/MIN
GFR NON-AFRICAN AMERICAN: >60 ML/MIN
GFR SERPL CREATININE-BSD FRML MDRD: ABNORMAL ML/MIN/{1.73_M2}
GFR SERPL CREATININE-BSD FRML MDRD: ABNORMAL ML/MIN/{1.73_M2}
GLUCOSE BLD-MCNC: 99 MG/DL (ref 70–99)
HCT VFR BLD CALC: 35.2 % (ref 40.7–50.3)
HEMOGLOBIN: 10.7 G/DL (ref 13–17)
LYMPHOCYTES, BODY FLUID: NORMAL %
Lab: NORMAL
MCH RBC QN AUTO: 29.8 PG (ref 25.2–33.5)
MCHC RBC AUTO-ENTMCNC: 30.4 G/DL (ref 28.4–34.8)
MCV RBC AUTO: 98.1 FL (ref 82.6–102.9)
MONOCYTE, FLUID: NORMAL %
NEUTROPHIL, FLUID: NORMAL %
NRBC AUTOMATED: 0 PER 100 WBC
OTHER CELLS FLUID: NORMAL %
PDW BLD-RTO: 13 % (ref 11.8–14.4)
PLATELET # BLD: 236 K/UL (ref 138–453)
PMV BLD AUTO: 9.2 FL (ref 8.1–13.5)
POTASSIUM SERPL-SCNC: 3.8 MMOL/L (ref 3.7–5.3)
RBC # BLD: 3.59 M/UL (ref 4.21–5.77)
RBC FLUID: 150 /MM3
SODIUM BLD-SCNC: 146 MMOL/L (ref 135–144)
SPECIMEN DESCRIPTION: NORMAL
SPECIMEN TYPE: NORMAL
SURGICAL PATHOLOGY REPORT: NORMAL
WBC # BLD: 7 K/UL (ref 3.5–11.3)
WBC FLUID: 922 /MM3

## 2020-07-15 PROCEDURE — 2700000000 HC OXYGEN THERAPY PER DAY

## 2020-07-15 PROCEDURE — 2000000000 HC ICU R&B

## 2020-07-15 PROCEDURE — 6360000002 HC RX W HCPCS: Performed by: FAMILY MEDICINE

## 2020-07-15 PROCEDURE — 6370000000 HC RX 637 (ALT 250 FOR IP): Performed by: PSYCHIATRY & NEUROLOGY

## 2020-07-15 PROCEDURE — 94761 N-INVAS EAR/PLS OXIMETRY MLT: CPT

## 2020-07-15 PROCEDURE — 94770 HC ETCO2 MONITOR DAILY: CPT

## 2020-07-15 PROCEDURE — 94003 VENT MGMT INPAT SUBQ DAY: CPT

## 2020-07-15 PROCEDURE — 6370000000 HC RX 637 (ALT 250 FOR IP): Performed by: FAMILY MEDICINE

## 2020-07-15 PROCEDURE — 80048 BASIC METABOLIC PNL TOTAL CA: CPT

## 2020-07-15 PROCEDURE — 85027 COMPLETE CBC AUTOMATED: CPT

## 2020-07-15 PROCEDURE — 36415 COLL VENOUS BLD VENIPUNCTURE: CPT

## 2020-07-15 PROCEDURE — 71045 X-RAY EXAM CHEST 1 VIEW: CPT

## 2020-07-15 PROCEDURE — 94640 AIRWAY INHALATION TREATMENT: CPT

## 2020-07-15 PROCEDURE — 2580000003 HC RX 258: Performed by: FAMILY MEDICINE

## 2020-07-15 PROCEDURE — 99232 SBSQ HOSP IP/OBS MODERATE 35: CPT | Performed by: PSYCHIATRY & NEUROLOGY

## 2020-07-15 RX ORDER — AMITRIPTYLINE HYDROCHLORIDE 25 MG/1
25 TABLET, FILM COATED ORAL NIGHTLY
Status: DISCONTINUED | OUTPATIENT
Start: 2020-07-15 | End: 2020-07-19 | Stop reason: HOSPADM

## 2020-07-15 RX ADMIN — ENOXAPARIN SODIUM 40 MG: 40 INJECTION SUBCUTANEOUS at 09:30

## 2020-07-15 RX ADMIN — PIPERACILLIN AND TAZOBACTAM 3.38 G: 3; .375 INJECTION, POWDER, LYOPHILIZED, FOR SOLUTION INTRAVENOUS at 13:05

## 2020-07-15 RX ADMIN — PIPERACILLIN AND TAZOBACTAM 3.38 G: 3; .375 INJECTION, POWDER, LYOPHILIZED, FOR SOLUTION INTRAVENOUS at 04:30

## 2020-07-15 RX ADMIN — IPRATROPIUM BROMIDE AND ALBUTEROL SULFATE 1 AMPULE: .5; 3 SOLUTION RESPIRATORY (INHALATION) at 11:31

## 2020-07-15 RX ADMIN — AMITRIPTYLINE HYDROCHLORIDE 25 MG: 25 TABLET, FILM COATED ORAL at 20:36

## 2020-07-15 RX ADMIN — IPRATROPIUM BROMIDE AND ALBUTEROL SULFATE 1 AMPULE: .5; 3 SOLUTION RESPIRATORY (INHALATION) at 15:14

## 2020-07-15 RX ADMIN — IPRATROPIUM BROMIDE AND ALBUTEROL SULFATE 1 AMPULE: .5; 3 SOLUTION RESPIRATORY (INHALATION) at 07:58

## 2020-07-15 RX ADMIN — SODIUM CHLORIDE: 9 INJECTION, SOLUTION INTRAVENOUS at 01:45

## 2020-07-15 RX ADMIN — PIPERACILLIN AND TAZOBACTAM 3.38 G: 3; .375 INJECTION, POWDER, LYOPHILIZED, FOR SOLUTION INTRAVENOUS at 20:36

## 2020-07-15 RX ADMIN — IPRATROPIUM BROMIDE AND ALBUTEROL SULFATE 1 AMPULE: .5; 3 SOLUTION RESPIRATORY (INHALATION) at 19:43

## 2020-07-15 RX ADMIN — SODIUM CHLORIDE: 9 INJECTION, SOLUTION INTRAVENOUS at 13:57

## 2020-07-15 ASSESSMENT — PULMONARY FUNCTION TESTS
PIF_VALUE: 16
PIF_VALUE: 18
PIF_VALUE: 22.4
PIF_VALUE: 17
PIF_VALUE: 14
PIF_VALUE: 20
PIF_VALUE: 18

## 2020-07-15 ASSESSMENT — PAIN SCALES - GENERAL: PAINLEVEL_OUTOF10: 0

## 2020-07-15 NOTE — PROGRESS NOTES
Comprehensive Nutrition Assessment    Type and Reason for Visit:  Reassess    Nutrition Recommendations/Plan: Continue Dental Soft diet and High Calorie high protein oral supplements as ordered. Nutrition Assessment:  Patient admitted with nutrition screen for poor intake/poor appetite and weight loss. Patient with trach and has pain with swallowing. PO intakes of 26-50% of meals and supplements with upward trend in weight. Continue Dental soft diet and high calorie high protein supplements as ordered. Malnutrition Assessment:  Malnutrition Status:  Severe malnutrition    Context:  Acute Illness     Findings of the 6 clinical characteristics of malnutrition:  Energy Intake:  7 - 50% or less of estimated energy requirements for 5 or more days  Weight Loss:  No significant weight loss     Body Fat Loss:  7 - Moderate body fat loss Orbital   Muscle Mass Loss:  7 - Moderate muscle mass loss Temples (temporalis)  Fluid Accumulation:  No significant fluid accumulation     Strength:  Not Performed       Estimated Daily Nutrient Needs:  Energy (kcal):  8697-4470 based on 30-33 kcal/kg of admission weight; Weight Used for Energy Requirements:  Admission     Protein (g):  70-88 based on 1.2-1.5 gm/kg of admission weight; Weight Used for Protein Requirements:  Admission        Fluid (ml/day):  1 ml/kcal; Weight Used for Fluid Requirements:  Admission      Nutrition Related Findings:  bowel sounds active. No edema      Wounds:  Deep Tissue Injury       Current Nutrition Therapies:    DIET DENTAL SOFT;  Dietary Nutrition Supplements: Standard High Calorie Oral Supplement    Anthropometric Measures:  · Height: 5' 10\" (177.8 cm)  · Current Body Weight: 131 lb 1.6 oz (59.5 kg)   · Admission Body Weight: 129 lb 3 oz (58.6 kg)    · Usual Body Weight: 160 lb (72.6 kg)     · Ideal Body Weight: 166 lbs; 79 lbs   · BMI: 18.8  · BMI Categories: Normal Weight (BMI 18.5-24. 9)       Nutrition Diagnosis:   · Inadequate oral intake related to impaired respiratory funtion as evidenced by BMI, weight loss, moderate loss of subcutaneous fat, moderate muscle loss, intake 26-50%    Nutrition Interventions:   Food and/or Nutrient Delivery:  Continue Current Diet, Continue Oral Nutrition Supplement  Nutrition Education/Counseling:  Education not indicated   Coordination of Nutrition Care:  Continued Inpatient Monitoring    Goals:  PO intakes to meet >75% of estimated nutrition needs       Nutrition Monitoring and Evaluation:   Behavioral-Environmental Outcomes:      Food/Nutrient Intake Outcomes:  Food and Nutrient Intake, Supplement Intake  Physical Signs/Symptoms Outcomes:  Biochemical Data, Weight, Skin, Nutrition Focused Physical Findings, Chewing or Swallowing     Discharge Planning:    Continue Oral Nutrition Supplement, Continue current diet       Some areas of assessment may be incomplete due to COVID-19 precautions    Etheleen Canavan, RD, LD  Office phone [phone]

## 2020-07-15 NOTE — FLOWSHEET NOTE
Writer rounding. Patient resting in bed and watching television There are no visitors. Patient engages with writer via writing, and asks for prayer. Writer offers support, presence, and prayer and patient expresses thanks. Spiritual Care will follow as needed.        07/15/20 1018   Encounter Summary   Services provided to: Patient   Referral/Consult From: [facility];Palliative Care   Continue Visiting   (7/15/20)   Complexity of Encounter Low   Length of Encounter 15 minutes   Spiritual Assessment Completed Yes   Routine   Type Follow up   Spiritual/Christian   Type Spiritual support   Assessment Approachable;Calm   Intervention Active listening;Explored feelings, thoughts, concerns;Prayer   Outcome Engaged in conversation;Expressed feelings/needs/concerns;Expressed gratitude

## 2020-07-15 NOTE — PROGRESS NOTES
Patient has increased rhonchi. Patient suctioned multiple times and continues to sound congested. Writer notified RT and decision was made to switch patient from the Trilogy to the Servo ventilator. Writer to update Dr. Mita Robles.

## 2020-07-15 NOTE — PLAN OF CARE
Nutrition Problem #1: Inadequate oral intake  Intervention: Food and/or Nutrient Delivery: Continue Current Diet, Continue Oral Nutrition Supplement  Nutritional Goals: PO intakes to meet >75% of estimated nutrition needs

## 2020-07-15 NOTE — PROGRESS NOTES
weight heparin  · Will follow with you    Orquidea Chaudhary MD, [place]  Pulmonary Critical Care and Sleep Medicine,  [facility]  Cell: [phone]  Office: [phone]

## 2020-07-15 NOTE — PROGRESS NOTES
[facility]) Neurology Specialist  Rubin Paulson 97  [facility], [address] [facility]:  [phone] or [phone]  FAX:  [phone]    Nancy Serrano [facility] 59, M. Alli Browne, [address] [facility], [place]. D.        Brief history: Ame Jimenez is a 53 y.o. old male admitted on 7/9/2020 with ALS and pulmonary insufficiency        Subjective: No new neurological events overnight. The patient is resting comfortably. He denies any new complaints. Shortness of breath improved. Objective: /78   Pulse 54   Temp 98.3 °F (36.8 °C) (Oral)   Resp 19   Ht 5' 10\" (1.778 m)   Wt 131 lb 1.6 oz (59.5 kg)   SpO2 95%   BMI 18.81 kg/m²       Medications:    amitriptyline  12.5 mg Oral Nightly    sodium chloride flush  10 mL Intravenous 2 times per day    enoxaparin  40 mg Subcutaneous Daily    ipratropium-albuterol  1 ampule Inhalation Q4H WA    piperacillin-tazobactam  3.375 g Intravenous Q8H        Neurological examination:    Mental status   Awake. He is able to mouth his words. Tracheostomy in place. Cranial nerves   II - visual fields intact to confrontation                                                III, IV, VI - extra-ocular muscles full: no pupillary defect; no JEOVANY, no nystagmus, no ptosis                                                                      V - normal facial sensation                                                               VII - normal facial symmetry                                                             VIII - intact hearing                                                                             IX, X - symmetrical palate                                                                  XI - symmetrical shoulder shrug                                                       XII - midline tongue without atrophy or fasciculation     Motor function   strength 4/5 in upper and lower extremities. Decreased muscle mass. Sensory function Intact to touch, pin, vibration, proprioception     Cerebellar  No involuntary movements or tremors     Reflex function Intact 2+ DTR and symmetric. Gait                  Not tested         Lab Results   Component Value Date    LDLCHOLESTEROL 75 10/19/2019     No components found for: CHLPL  Lab Results   Component Value Date    TRIG 64 10/19/2019     Lab Results   Component Value Date    HDL 42 10/19/2019     No results found for: LDLCALC  No results found for: LABVLDL  Lab Results   Component Value Date    LABA1C 5.9 10/21/2019     Lab Results   Component Value Date     10/21/2019     Lab Results   Component Value Date    [id number] 257 10/18/2019      Neurological work up:  CT head  CTA head and neck  MRI brain   2 D echo     Assessment and Recommendations:   Limb onset of amyotrophic lateral sclerosis  Status post tracheostomy  Left lower lobe pneumonia    I will increase dose of amitriptyline to 25 mg p.o. nightly to help with oral secretions. Continued management as per pulmonary critical care. We will follow. Gennaro Sharma MD  Neurology    This note is created with the assistance of a speech-recognition program. While intending to generate a document that actually reflects the content of the visit, the document can still have some errors including those of syntax and sound a- like substitutions which may escape proofreading. In such instances, actual meaning can be extrapolated by contextual derivation.

## 2020-07-15 NOTE — PROGRESS NOTES
[address] [place] [facility]  Speech Language Pathology    Date: 7/15/2020  Patient Name: Chivo Koroma  YOB: 1965   AGE: 53 y.o. MRN: [id number]        Patient Not Available for Speech Therapy     Due to:  [] Testing  [] Hemodialysis  [] Cancelled by RN  [] Surgery   [] Intubation/Sedation/Pain Medication  [] Medical instability  [x] Other: Attempted ST for diet tolerance monitoring, however pt with other disciplines. ST to continue to follow. Next scheduled treatment: 7/16; 7/17    Completed by:  Bety Fuentes M.S. [address] [facility]

## 2020-07-15 NOTE — PROGRESS NOTES
coping/adaptation/distress of patient  Discussing meaning/purpose   Continue with current plan of care  Clarification of medical condition to patient and family  Code status clarified: [address] arranged for home after discharge.  Will follow for any needs while he is here      Palliative Care Coordinator  Gagandeep Dolan RN, [address] [facility] Office: [address] [facility] Office: [phone]  Work Cell Phone: [phone]    For [facility] Management Clinic scheduling please call [phone]

## 2020-07-15 NOTE — PLAN OF CARE
Problem: Skin Integrity:  Goal: Will show no infection signs and symptoms  Description: Will show no infection signs and symptoms  7/15/2020 1240 by Familia Terry RN  Outcome: Ongoing  Note: Skin care assessment performed and charted. Assessed for areas of redness and or breakdown. Patient presented to the hospital with a pre-existing wound. Osvaldo scale order set in place. Waffle mattress present. Patient instructed on the importance of position changes for the prevention of further skin breakdown. Barrier cream applied. Patient requires much encouragement and persistence to change positions. Assessed oral mucosa. Assessed for areas of redness, thrush, open areas and dentition. Monitored dietary intake. Encouraged adequate po intake for prevention of further skin breakdown and maintenance of skin integrity, as well as promotion of wound healing. 7/15/2020 0129 by Kayla Pelletier RN  Outcome: Ongoing     Problem: Falls - Risk of: Intervention: Appropriate assistance to ensure safe transfer  Note: Continue fall precautions including arm band identification, falling star placed outside of the room and alarm at night and when unattended. Use of ambulatory aids when indicated and frequent visual checks. Monitored orientation status. Call light in reach at all times. Bed remains in lowest locked position. Instructed to call for assistance prior to attempting to get OOB. Fall precautions remain in place. Falling star posted outside of door.

## 2020-07-16 ENCOUNTER — APPOINTMENT (OUTPATIENT)
Dept: GENERAL RADIOLOGY | Age: 55
DRG: 870 | End: 2020-07-16
Payer: MEDICARE

## 2020-07-16 LAB
ABSOLUTE EOS #: 0.39 K/UL (ref 0–0.44)
ABSOLUTE IMMATURE GRANULOCYTE: 0 K/UL (ref 0–0.3)
ABSOLUTE LYMPH #: 1.17 K/UL (ref 1.1–3.7)
ABSOLUTE MONO #: 0.65 K/UL (ref 0.1–1.2)
ANION GAP SERPL CALCULATED.3IONS-SCNC: 11 MMOL/L (ref 9–17)
BASOPHILS # BLD: 1 % (ref 0–2)
BASOPHILS ABSOLUTE: 0.07 K/UL (ref 0–0.2)
BUN BLDV-MCNC: 8 MG/DL (ref 6–20)
BUN/CREAT BLD: 16 (ref 9–20)
CALCIUM SERPL-MCNC: 8.7 MG/DL (ref 8.6–10.4)
CHLORIDE BLD-SCNC: 104 MMOL/L (ref 98–107)
CO2: 29 MMOL/L (ref 20–31)
CREAT SERPL-MCNC: 0.49 MG/DL (ref 0.7–1.2)
CULTURE: ABNORMAL
DIFFERENTIAL TYPE: ABNORMAL
DIRECT EXAM: ABNORMAL
DIRECT EXAM: ABNORMAL
EOSINOPHILS RELATIVE PERCENT: 6 % (ref 1–4)
GFR AFRICAN AMERICAN: >60 ML/MIN
GFR NON-AFRICAN AMERICAN: >60 ML/MIN
GFR SERPL CREATININE-BSD FRML MDRD: ABNORMAL ML/MIN/{1.73_M2}
GFR SERPL CREATININE-BSD FRML MDRD: ABNORMAL ML/MIN/{1.73_M2}
GLUCOSE BLD-MCNC: 103 MG/DL (ref 70–99)
HCT VFR BLD CALC: 31.5 % (ref 40.7–50.3)
HEMOGLOBIN: 10 G/DL (ref 13–17)
IMMATURE GRANULOCYTES: 0 %
LYMPHOCYTES # BLD: 18 % (ref 24–43)
Lab: ABNORMAL
MAGNESIUM: 1.9 MG/DL (ref 1.6–2.6)
MCH RBC QN AUTO: 30.9 PG (ref 25.2–33.5)
MCHC RBC AUTO-ENTMCNC: 31.7 G/DL (ref 28.4–34.8)
MCV RBC AUTO: 97.2 FL (ref 82.6–102.9)
MONOCYTES # BLD: 10 % (ref 3–12)
NRBC AUTOMATED: 0 PER 100 WBC
PDW BLD-RTO: 13 % (ref 11.8–14.4)
PLATELET # BLD: 194 K/UL (ref 138–453)
PLATELET ESTIMATE: ABNORMAL
PMV BLD AUTO: 9.8 FL (ref 8.1–13.5)
POTASSIUM SERPL-SCNC: 3.5 MMOL/L (ref 3.7–5.3)
RBC # BLD: 3.24 M/UL (ref 4.21–5.77)
RBC # BLD: ABNORMAL 10*6/UL
SEG NEUTROPHILS: 65 % (ref 36–65)
SEGMENTED NEUTROPHILS ABSOLUTE COUNT: 4.22 K/UL (ref 1.5–8.1)
SODIUM BLD-SCNC: 144 MMOL/L (ref 135–144)
SPECIMEN DESCRIPTION: ABNORMAL
WBC # BLD: 6.5 K/UL (ref 3.5–11.3)
WBC # BLD: ABNORMAL 10*3/UL

## 2020-07-16 PROCEDURE — 71045 X-RAY EXAM CHEST 1 VIEW: CPT

## 2020-07-16 PROCEDURE — 94640 AIRWAY INHALATION TREATMENT: CPT

## 2020-07-16 PROCEDURE — 85025 COMPLETE CBC W/AUTO DIFF WBC: CPT

## 2020-07-16 PROCEDURE — 36415 COLL VENOUS BLD VENIPUNCTURE: CPT

## 2020-07-16 PROCEDURE — 2700000000 HC OXYGEN THERAPY PER DAY

## 2020-07-16 PROCEDURE — 94770 HC ETCO2 MONITOR DAILY: CPT

## 2020-07-16 PROCEDURE — 6360000002 HC RX W HCPCS: Performed by: FAMILY MEDICINE

## 2020-07-16 PROCEDURE — 2000000000 HC ICU R&B

## 2020-07-16 PROCEDURE — 94761 N-INVAS EAR/PLS OXIMETRY MLT: CPT

## 2020-07-16 PROCEDURE — 6370000000 HC RX 637 (ALT 250 FOR IP): Performed by: FAMILY MEDICINE

## 2020-07-16 PROCEDURE — 2580000003 HC RX 258: Performed by: FAMILY MEDICINE

## 2020-07-16 PROCEDURE — 99232 SBSQ HOSP IP/OBS MODERATE 35: CPT | Performed by: PSYCHIATRY & NEUROLOGY

## 2020-07-16 PROCEDURE — 83735 ASSAY OF MAGNESIUM: CPT

## 2020-07-16 PROCEDURE — 6370000000 HC RX 637 (ALT 250 FOR IP): Performed by: PSYCHIATRY & NEUROLOGY

## 2020-07-16 PROCEDURE — 80048 BASIC METABOLIC PNL TOTAL CA: CPT

## 2020-07-16 PROCEDURE — 94003 VENT MGMT INPAT SUBQ DAY: CPT

## 2020-07-16 PROCEDURE — 6370000000 HC RX 637 (ALT 250 FOR IP): Performed by: INTERNAL MEDICINE

## 2020-07-16 RX ORDER — LORAZEPAM 2 MG/ML
0.5 INJECTION INTRAMUSCULAR EVERY 4 HOURS PRN
Status: DISCONTINUED | OUTPATIENT
Start: 2020-07-16 | End: 2020-07-19 | Stop reason: HOSPADM

## 2020-07-16 RX ADMIN — SODIUM CHLORIDE: 9 INJECTION, SOLUTION INTRAVENOUS at 03:14

## 2020-07-16 RX ADMIN — PIPERACILLIN AND TAZOBACTAM 3.38 G: 3; .375 INJECTION, POWDER, LYOPHILIZED, FOR SOLUTION INTRAVENOUS at 20:45

## 2020-07-16 RX ADMIN — PIPERACILLIN AND TAZOBACTAM 3.38 G: 3; .375 INJECTION, POWDER, LYOPHILIZED, FOR SOLUTION INTRAVENOUS at 11:35

## 2020-07-16 RX ADMIN — PIPERACILLIN AND TAZOBACTAM 3.38 G: 3; .375 INJECTION, POWDER, LYOPHILIZED, FOR SOLUTION INTRAVENOUS at 03:26

## 2020-07-16 RX ADMIN — POTASSIUM BICARBONATE 40 MEQ: 782 TABLET, EFFERVESCENT ORAL at 10:39

## 2020-07-16 RX ADMIN — IPRATROPIUM BROMIDE AND ALBUTEROL SULFATE 1 AMPULE: .5; 3 SOLUTION RESPIRATORY (INHALATION) at 18:13

## 2020-07-16 RX ADMIN — ENOXAPARIN SODIUM 40 MG: 40 INJECTION SUBCUTANEOUS at 08:56

## 2020-07-16 RX ADMIN — AMITRIPTYLINE HYDROCHLORIDE 25 MG: 25 TABLET, FILM COATED ORAL at 20:46

## 2020-07-16 RX ADMIN — IPRATROPIUM BROMIDE AND ALBUTEROL SULFATE 1 AMPULE: .5; 3 SOLUTION RESPIRATORY (INHALATION) at 12:04

## 2020-07-16 ASSESSMENT — PULMONARY FUNCTION TESTS
PIF_VALUE: 28.4
PIF_VALUE: 14
PIF_VALUE: 17
PIF_VALUE: 24
PIF_VALUE: 25
PIF_VALUE: 18

## 2020-07-16 NOTE — PLAN OF CARE
Problem: Skin Integrity:  Goal: Will show no infection signs and symptoms  Description: Will show no infection signs and symptoms  Outcome: Ongoing  Goal: Absence of new skin breakdown  Description: Absence of new skin breakdown  Outcome: Ongoing  Pt at risk for impaired skin integrity. Pt able to adjust himself in bed properly. RN offers to assist pt with repositioning, pt declines at this time. RN will continue to promote frequent repositioning and ensure environment is clean and dry.      Problem: Falls - Risk of:  Goal: Will remain free from falls  Description: Will remain free from falls  Outcome: Ongoing  Goal: Absence of physical injury  Description: Absence of physical injury  Outcome: Ongoing     Problem: Nutrition  Goal: Optimal nutrition therapy  Outcome: Ongoing

## 2020-07-16 NOTE — PROGRESS NOTES
[facility] ([facility]) Neurology Specialist  Rubin Paulson 97  [facility], [address] [facility]:  [phone] or [phone]  FAX:  [phone]    Jocelyn Muller 58, M. [facility], [address] [place] [address]. D.        Brief history: Chivo Koroma is a 53 y.o. old male admitted on 7/9/2020 with ALS and pulmonary insufficiency        Subjective: No new neurological events overnight. The patient denies having any significant change in his oral secretions. He is scheduled for a [facility] tomorrow. Objective: /83   Pulse 55   Temp 98.6 °F (37 °C) (Oral)   Resp 19   Ht 5' 10\" (1.778 m)   Wt 135 lb 8 oz (61.5 kg)   SpO2 92%   BMI 19.44 kg/m²       Medications:    amitriptyline  25 mg Oral Nightly    sodium chloride flush  10 mL Intravenous 2 times per day    enoxaparin  40 mg Subcutaneous Daily    ipratropium-albuterol  1 ampule Inhalation Q4H WA    piperacillin-tazobactam  3.375 g Intravenous Q8H        Neurological examination:    Mental status   Awake. He is able to mouth his words. Tracheostomy in place.      Cranial nerves   II - visual fields intact to confrontation                                                III, IV, VI - extra-ocular muscles full: no pupillary defect; no JEOVANY, no nystagmus, no ptosis                                                                      V - normal facial sensation                                                               VII - normal facial symmetry                                                             VIII - intact hearing                                                                             IX, X - symmetrical palate                                                                  XI - symmetrical shoulder shrug                                                       XII - midline tongue without atrophy or fasciculation     Motor function   strength 4/5 in upper and lower extremities. Decreased muscle mass. Sensory function Intact to touch, pin, vibration, proprioception     Cerebellar  No involuntary movements or tremors     Reflex function Intact 2+ DTR and symmetric. Gait                  Not tested         Lab Results   Component Value Date    LDLCHOLESTEROL 75 10/19/2019     No components found for: CHLPL  Lab Results   Component Value Date    TRIG 64 10/19/2019     Lab Results   Component Value Date    HDL 42 10/19/2019     No results found for: LDLCALC  No results found for: LABVLDL  Lab Results   Component Value Date    LABA1C 5.9 10/21/2019     Lab Results   Component Value Date     10/21/2019     Lab Results   Component Value Date    [id number] 492 10/18/2019      Neurological work up:  CT head  CTA head and neck  MRI brain   2 D echo     Assessment and Recommendations:   Limb onset of amyotrophic lateral sclerosis  Status post tracheostomy  Left lower lobe pneumonia    The patient is neurologically unchanged. Recommend continue amitriptyline 25 mg p.o. nightly for oral secretions. Continued IV antibiotics with Zosyn and vancomycin as per primary team.  The patient is scheduled for a bronchoscopy tomorrow    Okay to be discharged from the hospital from neurological standpoint if otherwise stable. Patient to follow-up with neurology clinic with Dr. Flores Persons in next 4 to 6 weeks    We will follow while he is here. Luc Schaeffer MD  Neurology    This note is created with the assistance of a speech-recognition program. While intending to generate a document that actually reflects the content of the visit, the document can still have some errors including those of syntax and sound a- like substitutions which may escape proofreading. In such instances, actual meaning can be extrapolated by contextual derivation.

## 2020-07-16 NOTE — PLAN OF CARE
Problem: Skin Integrity:  Goal: Will show no infection signs and symptoms  Description: Will show no infection signs and symptoms  7/15/2020 2057 by Delisa Khan RN  Outcome: Ongoing  7/15/2020 1240 by Estefani Snyder RN  Outcome: Ongoing  Note: Skin care assessment performed and charted. Assessed for areas of redness and or breakdown. Patient presented to the hospital with a pre-existing wound. Osvaldo scale order set in place. Waffle mattress present. Patient instructed on the importance of position changes for the prevention of further skin breakdown. Barrier cream applied. Patient requires much encouragement and persistence to change positions. Assessed oral mucosa. Assessed for areas of redness, thrush, open areas and dentition. Monitored dietary intake. Encouraged adequate po intake for prevention of further skin breakdown and maintenance of skin integrity, as well as promotion of wound healing. Goal: Absence of new skin breakdown  Description: Absence of new skin breakdown  7/15/2020 2057 by Delisa Khan RN  Outcome: Ongoing  7/15/2020 1240 by Estefani Snyder RN  Outcome: Ongoing     Problem: Falls - Risk of:  Goal: Will remain free from falls  Description: Will remain free from falls  7/15/2020 2057 by Delisa Khan RN  Outcome: Ongoing  7/15/2020 1240 by Estefani Snyder RN  Outcome: Ongoing  Pt is a fall risk this admission. Call light and items within reach, bed in lowest locked position, siderails up X3. Pt uses call light appropriately, non-skid socks worn. RN will continue to monitor.   Goal: Absence of physical injury  Description: Absence of physical injury  7/15/2020 2057 by Delisa Khan RN  Outcome: Ongoing  7/15/2020 1240 by Estefani Snyder RN  Outcome: Ongoing     Problem: Nutrition  Goal: Optimal nutrition therapy  Outcome: Ongoing

## 2020-07-16 NOTE — CARE COORDINATION
Discharge planning    Patient chart reviewed. To have bronch in am. Will need to discuss with pulmonary if need to consider LTAC vs  Home.  Will also need to discuss moving to his trilogy when appropriate to see if need new settings and or more 02 bled thru

## 2020-07-16 NOTE — PROGRESS NOTES
input(s): POCGLU in the last 72 hours. General appearance -   Sleepy  Mental status - unable to assess  Head - normocephalic and atraumatic  Eyes - pupils equal and reactive, extraocular eye movements intact, conjunctiva clear  Ears - hearing appears to be intact  Nose - no drainage noted  Mouth - mucous membranes moist  Neck -  tracheostomy  Chest - clear to auscultation, normal effort  Heart - normal rate, regular rhythm, no murmur  Abdomen - soft, nontender, nondistended, bowel sounds present all four quadrants, no masses, hepatomegaly or splenomegaly  Neurological - normal speech, no focal findings or movement disorder noted, cranial nerves II through XII grossly intact  Extremities - peripheral pulses palpable, no pedal edema or calf pain with palpation  Skin - no gross lesions, rashes, or induration noted        Medications: Allergies:    Allergies   Allergen Reactions    Sulfa Antibiotics Other (See Comments)     As child (unknown reaction)       Current Meds:   Current Facility-Administered Medications:     amitriptyline (ELAVIL) tablet 25 mg, 25 mg, Oral, Nightly, Stephen Lee MD, 25 mg at 07/15/20 2036    potassium chloride (KLOR-CON M) extended release tablet 40 mEq, 40 mEq, Oral, PRN, 40 mEq at 07/13/20 0608 **OR** potassium bicarb-citric acid (EFFER-K) effervescent tablet 40 mEq, 40 mEq, Oral, PRN **OR** potassium chloride 10 mEq/100 mL IVPB (Peripheral Line), 10 mEq, Intravenous, PRN, Светлана Lopez MD    morphine sulfate (PF) injection 4 mg, 4 mg, Intravenous, Q4H PRN, Zeynep Painter MD    HYDROcodone-acetaminophen (NORCO) 5-325 MG per tablet 1 tablet, 1 tablet, Oral, Q4H PRN, Zeynep Painter MD    0.9 % sodium chloride infusion, , Intravenous, Continuous, Zeynep Painter MD, Last Rate: 75 mL/hr at 07/15/20 1357    sodium chloride flush 0.9 % injection 10 mL, 10 mL, Intravenous, 2 times per day, Nathan Trejo MD, Stopped at 07/10/20 2103    sodium chloride flush 0.9 % injection 10 mL, 10 mL, Intravenous, PRN, Zeynep Painter MD    acetaminophen (TYLENOL) tablet 650 mg, 650 mg, Oral, Q6H PRN **OR** acetaminophen (TYLENOL) suppository 650 mg, 650 mg, Rectal, Q6H PRN, Zeynep Painter MD    magnesium hydroxide (MILK OF MAGNESIA) 400 MG/5ML suspension 30 mL, 30 mL, Oral, Daily PRN, Zeynep Painter MD    promethazine (PHENERGAN) tablet 12.5 mg, 12.5 mg, Oral, Q6H PRN **OR** ondansetron (ZOFRAN) injection 4 mg, 4 mg, Intravenous, Q6H PRN, Zeynep Painter MD    enoxaparin (LOVENOX) injection 40 mg, 40 mg, Subcutaneous, Daily, Zeynep Painter MD, 40 mg at 07/15/20 0930    albuterol (PROVENTIL) nebulizer solution 2.5 mg, 2.5 mg, Nebulization, Q2H PRN, Zeynep Painter MD    ipratropium-albuterol (DUONEB) nebulizer solution 1 ampule, 1 ampule, Inhalation, Q4H WA, Zeynep Painter MD, 1 ampule at 07/15/20 1943    piperacillin-tazobactam (ZOSYN) 3.375 g in dextrose 5 % 50 mL IVPB extended infusion (mini-bag), 3.375 g, Intravenous, Q8H, Zeynep Painter MD, Last Rate: 12.5 mL/hr at 07/15/20 2036, 3.375 g at 07/15/20 2036      I/O (24Hr):     Intake/Output Summary (Last 24 hours) at 7/15/2020 2057  Last data filed at 7/15/2020 1800  Gross per 24 hour   Intake 1747.75 ml   Output 750 ml   Net 997.75 ml       Data:           Labs:    Hematology:  Recent Labs     07/13/20  0421 07/14/20  0502 07/15/20  0424   WBC 8.4 6.7 7.0   RBC 3.19* 3.18* 3.59*   HGB 9.9* 9.8* 10.7*   HCT 31.4* 31.2* 35.2*   MCV 98.4 98.1 98.1   MCH 31.0 30.8 29.8   MCHC 31.5 31.4 30.4   RDW 13.0 12.9 13.0    198 236   MPV 9.1 9.0 9.2     Chemistry:  Recent Labs     07/13/20  0421 07/14/20  0502 07/15/20  0424    142 146*   K 3.2* 3.7 3.8   CL 99 101 103   CO2 31 31 33*   GLUCOSE 96 91 99   BUN 7 7 10   CREATININE 0.45* 0.47* 0.51*   MG 1.9  --   --    ANIONGAP 11 10 10   LABGLOM >60 >60 >60   GFRAA >60 >60 >60   CALCIUM 8.7 8.8 9.0     No results for input(s): PROT, LABALBU, LABA1C, W1LWOTZ, T8OOAME, FT4, TSH, AST, ALT, LDH, GGT, ALKPHOS, LABGGT, BILITOT, BILIDIR, AMMONIA, AMYLASE, LIPASE, LACTATE, CHOL, HDL, LDLCHOLESTEROL, CHOLHDLRATIO, TRIG, VLDL, AEH43EJ, PHENYTOIN, PHENYF, URICACID, POCGLU in the last 72 hours. Lab Results   Component Value Date/Time    SPECIAL NOT REPORTED 07/14/2020 12:43 PM    SPECIAL NOT REPORTED 07/14/2020 12:43 PM    SPECIAL NOT REPORTED 07/14/2020 12:43 PM     Lab Results   Component Value Date/Time    CULTURE CULTURE IN PROGRESS 07/14/2020 12:43 PM    CULTURE PENDING 07/14/2020 12:43 PM       Lab Results   Component Value Date    POCPH 7.39 07/10/2020    POCPCO2 43 07/10/2020    POCPO2 92 07/10/2020    POCHCO3 25.7 07/10/2020    NBEA NOT REPORTED 07/10/2020    PBEA 1 07/10/2020    PAZ4ZIB 27 07/10/2020    MSQU3HPM 97 07/10/2020    FIO2 NOT REPORTED 07/10/2020       Radiology:    Xr Chest Portable    Result Date: 7/14/2020  Left basilar effusion with adjacent left basilar consolidation consistent with pneumonia. Xr Chest Portable    Result Date: 7/12/2020  Worsening left basilar opacity     Xr Chest Portable    Result Date: 7/10/2020  Small increased left lung base opacity is nonspecific and may represent atelectasis with pneumonia or aspiration not excluded. Xr Chest Portable    Result Date: 7/9/2020  Acute airspace disease at the left lung base concerning for pneumonia or aspiration. Radiographic follow-up is recommended. Ct Chest Pulmonary Embolism W Contrast    Result Date: 7/9/2020  1. Debris in the trachea predominantly extends into the left mainstem bronchus with associated consolidative opacity in the left lower lobe and inferior lingula, suggestive of aspiration pneumonitis. There is also debris in the right lower lobe segmental bronchi without associated airspace disease. 2.  No evidence for acute pulmonary embolism. Fl Modified Barium Swallow W Video    Result Date: 7/10/2020  1. Flash penetration with the thin liquid substance by cup without aspiration.  2. No penetration or aspiration with the remainder of the administered substances/attempts. Please see separate speech pathology report for full discussion of findings and recommendations. All radiological studies reviewed  Code Status:  DNR-CCA        Electronically signed by Shankar Akers MD on 7/15/2020 at 8:57 PM    This note was created with the assistance of a speech-recognition program.  Although the intention is to generate a document that actually reflects the content of the visit, no guarantees can be provided that every mistake has been identified and corrected by editing. Note was updated later by me after  physical examination and  completion of the assessment.

## 2020-07-16 NOTE — PROGRESS NOTES
Pulmonary Critical Care Progress Note  Peg Wheatley MD     Patient seen for the follow up of Acute on chronic hypoxic and hypercarbic respiratory failure, pneumonia, aspiration, ALS, remote history of smoking    Subjective:  Patient had uneventful night. Positive tracheal secretions. He denies any chest pain. He is tolerating diet. His shortness of breath is getting better. He is not tolerating switch to home ventilator. Examination:  Vitals: /86   Pulse 69   Temp 98.1 °F (36.7 °C) (Temporal)   Resp 20   Ht 5' 10\" (1.778 m)   Wt 135 lb 8 oz (61.5 kg)   SpO2 97%   BMI 19.44 kg/m²   General appearance:  alert and cooperative with exam  Neck: No JVD  Lungs: Basal crackles, moderate air exchange  Heart: regular rate and rhythm, S1, S2 normal, no gallop  Abdomen: Soft, non tender, + BS  Extremities: no cyanosis or clubbing.  No significant edema    LABs:  CBC:   Recent Labs     07/15/20  0424 07/16/20  0504   WBC 7.0 6.5   HGB 10.7* 10.0*   HCT 35.2* 31.5*    194     BMP:   Recent Labs     07/15/20  0424 07/16/20  0504   * 144   K 3.8 3.5*   CO2 33* 29   BUN 10 8   CREATININE 0.51* 0.49*   LABGLOM >60 >60   GLUCOSE 99 103*       Lab Results   Component Value Date    POCPH 7.39 07/10/2020    POCPCO2 43 07/10/2020    POCPO2 92 07/10/2020    POCHCO3 25.7 07/10/2020    NBEA NOT REPORTED 07/10/2020    PBEA 1 07/10/2020    CGF9WRQ 27 07/10/2020    LREV8NJW 97 07/10/2020    FIO2 NOT REPORTED 07/10/2020     Radiology:  7/16/2020      Impression:  · Acute on chronic hypoxic and hypercarbic respiratory failure  · LLL pneumonia  · Aspiration  · ALS s/p recent trach  · ~10-pack-year smoking, during his 25s    Recommendations:  · Continue IV antibiotics, zoysyn, vancomycin  · IV fluids  · Albuterol and Ipratropium Q 4 hours and prn  · X-ray chest in am  · Labs: CBC and BMP in am  · Vent support, start switching it to home trilogy machine  · Dr. Shadi Akers, neurology input noted for worsening neuromuscular weakness  · Trach care  · Will arrange for bronchoscopy for pulmonary toilet/mucous plugging in the morning  · DVT prophylaxis with low molecular weight heparin  · Will follow with you    Mercedes Morse MD, [facility]  Pulmonary Critical Care and Sleep Medicine,  [facility]  Cell: [phone]  Office: [phone]

## 2020-07-16 NOTE — FLOWSHEET NOTE
Writer rounding. Patient resting in bed. There are no visitors. Patient indicates by hand gesture that he is \"so-so. \" He expresses no need at present, but thanks writer for visit. Writer offers brief support and presence. Spiritual Care will follow as needed. 07/16/20 1041   Encounter Summary   Services provided to: Patient   Referral/Consult From: [place];Palliative Care   Support System Spouse; Family members   Continue Visiting   (7/16/20)   Complexity of Encounter Low   Length of Encounter 15 minutes   Spiritual Assessment Completed Yes   Routine   Type Follow up   Assessment Approachable   Intervention Active listening;Sustaining presence/ Ministry of presence   Outcome Engaged in conversation  (via gestures)

## 2020-07-16 NOTE — CARE COORDINATION
Discharge planning    Spoke with Jossie SEVILLA regarding patient. If patient would need any adjustments to 02 or trilogy will need update notes, and rx for [employer]. Patient will also need to be placed on his own unit prior to dc to evaluate if needs more 02 bled thru. His current order is 2 liters.

## 2020-07-16 NOTE — DISCHARGE INSTR - COC
Continuity of Care Form    Patient Name: Sherry Reddy   :  1965  MRN:  [id number]    Admit date:  2020  Discharge date:  2020      Code Status Order: DNR-CCA   Advance Directives:     Admitting Physician:  Britney Bolanos MD  PCP: Britney Bolanos MD    Discharging Nurse: Alton Montero [address] Unit/Room#: 1110/1110-01  Discharging Unit Phone Number: [phone]    Emergency Contact:   Extended Emergency Contact Information  Primary Emergency Contact: [facility]  Address: [address] [place] [address], [place] [address]  Home Phone: [phone]  Work Phone: [phone]  Mobile Phone: [phone]  Relation: Spouse   needed?  No    Past Surgical History:  Past Surgical History:   Procedure Laterality Date    APPENDECTOMY      COLONOSCOPY  2017    CYSTOSCOPY  10/05/2016    LITHOTRIPSY Right 10/19/2016    LUMBAR FUSION  2018    LUMBAR INTERBODY FUSION POSTERIOR L3-4    MI LUMBAR SPINE FUSN,POST INTRBDY N/A 2018    LUMBAR INTERBODY FUSION POSTERIOR L3-4,  ([address] - OFFICE TO NOTIFY, ROTATING AARON TABLE, C-ARM) performed by Tyrel Ho MD at [address] N/A 2020    TRACHEOSTOMY PERCUTANEOUS performed by Venice Lambert MD at [facility] History:   Immunization History   Administered Date(s) Administered    Influenza, Triv, 3 Years and older, IM, PF (Afluria 5yrs and older) 10/06/2016       Active Problems:  Patient Active Problem List   Diagnosis Code    ureteral calculus ,right N13.30    Bradycardia R00.1    Renal calculus, right N20.0    Lumbar stenosis with neurogenic claudication M48.062    Respiratory failure ([address] J96.90    Acute respiratory failure with hypercapnia (HCC) J96.02    Neuromuscular disorder ([address] G70.9    Severe malnutrition ([address] E43    Atelectasis J98.11    Tracheobronchitis J40    Acute respiratory failure (HCC) J96.00    Fever R50.9    Ventilator associated pneumonia ([address] A70.864       Isolation/Infection:   Isolation          No Isolation        Patient Infection Status     Infection Onset Added Last Indicated Last Indicated By Review Planned Expiration Resolved Resolved By    None active    Resolved    COVID-19 Rule Out 06/26/20 06/26/20 06/26/20 COVID-19 (Ordered)   06/26/20 Rule-Out Test Resulted    COVID-19 Rule Out 06/25/20 06/25/20 06/25/20 COVID-19 (Ordered)   06/26/20 Rule-Out Test Resulted          Nurse Assessment:  Last Vital Signs: /87   Pulse 51   Temp 98.1 °F (36.7 °C) (Oral)   Resp 19   Ht 5' 10\" (1.778 m)   Wt 135 lb 8 oz (61.5 kg)   SpO2 97%   BMI 19.44 kg/m²     Last documented pain score (0-10 scale): Pain Level: 0  Last Weight:   Wt Readings from Last 1 Encounters:   07/16/20 135 lb 8 oz (61.5 kg)     Mental Status:  oriented    IV Access:  - None    Nursing Mobility/ADLs:  Walking   Assisted  Transfer  Assisted  Bathing  Assisted  Dressing  Assisted  Toileting  Assisted  Feeding  Independent  Med Admin  Independent  Med Delivery   whole    Wound Care Documentation and Therapy:  Incision 05/30/18 Back (Active)   Number of days: 742       Wound 07/01/20 Sacrum Mid open area sherly size with arera surrounding skin purplish black non blanching (Active)   Wound Deep tissue/Injury 07/15/20 2000   Dressing/Treatment Other (comment) 07/16/20 0400   Wound Assessment Other (Comment) 07/16/20 0400   Drainage Amount None 07/12/20 0400   Odor None 07/11/20 0400   Margins Attached edges 07/11/20 0400   Michelle-wound Assessment Clean;Dry; Intact 07/11/20 0400   Number of days: 14        Elimination:  Continence:   · Bowel: Yes  · Bladder: Yes  Urinary Catheter: None   Colostomy/Ileostomy/Ileal Conduit: No       Date of Last BM: 7/18    Intake/Output Summary (Last 24 hours) at 7/16/2020 0740  Last data filed at 7/16/2020 0510  Gross per 24 hour   Intake 1936.71 ml   Output 1050 ml   Net 886.71 ml     I/O last 3 completed shifts:   In: 1936.7 [P.O.:50; I.V.:1811; IV Piggyback:75.7]  Out: 1050 [Urine:1050]    Safety Concerns:     None    Impairments/Disabilities:      Speech,  Chronic trach    Nutrition Therapy:  Current Nutrition Therapy:   - Oral Diet:  Dental Soft    Routes of Feeding: Oral  Liquids: No Restrictions  Daily Fluid Restriction: no  Last Modified Barium Swallow with Video (Video Swallowing Test): not done    Treatments at the Time of Hospital Discharge:   Respiratory Treatments: see mar   Oxygen Therapy:  is on oxygen at 2 L/min per nasal cannula. thru trilogy vent  Ventilator:    - trilogy vent at home     Rehab Therapies: Physical Therapy and Occupational Therapy  Weight Bearing Status/Restrictions: No weight bearing restirctions  Other Medical Equipment (for information only, NOT a DME order):  wheelchair and bath bench  Other Treatments:   1. Skilled RN assessment  2. HHA   3. Medication reconciliation  4. Trach care per protocol : Travis 6 DCT inner cannulas to be changed twice daily. Drain sponges for around tracheostomy site, Normal saline, Peroxide and 4x4 dressings to clean around tracheostomy site every 8-12 hours. 5. Teach trach care.   6.  MSW for [facility] HHA assistance in home       Patient's personal belongings (please select all that are sent with patient):  Glasses    RN SIGNATURE:  Electronically signed by Francesco Holder RN on 7/19/20 at 5:49 PM EDT    CASE MANAGEMENT/SOCIAL WORK SECTION    Inpatient Status Date: 7/16    Readmission Risk Assessment Score:  Readmission Risk              Risk of Unplanned Readmission:        17           Discharging to Facility/ Agency   · Name: [facility]  · Phone: [phone]  · Fax: [phone]    Dialysis Facility (if applicable)   · Name:  · Address:  · Dialysis Schedule:  · Phone:  · Fax:    / signature: Electronically signed by Perry Kenyon RN on 7/16/20 at 7:40 AM EDT    PHYSICIAN SECTION    Prognosis: Good    Condition at Discharge: Stable    Rehab Potential (if transferring to Rehab): Good    Recommended Labs or Other Treatments After Discharge:     Physician Certification: I certify the above information and transfer of Sejal Christensen  is necessary for the continuing treatment of the diagnosis listed and that he requires [address] for greater 30 days.      Update Admission H&P: No change in H&P    PHYSICIAN SIGNATURE:  Electronically signed by Parker Cooper MD on 7/19/20 at 6:51 PM EDT

## 2020-07-17 ENCOUNTER — APPOINTMENT (OUTPATIENT)
Dept: GENERAL RADIOLOGY | Age: 55
DRG: 870 | End: 2020-07-17
Payer: MEDICARE

## 2020-07-17 LAB
ABSOLUTE EOS #: 0.23 K/UL (ref 0–0.44)
ABSOLUTE IMMATURE GRANULOCYTE: 0.17 K/UL (ref 0–0.3)
ABSOLUTE LYMPH #: 1.48 K/UL (ref 1.1–3.7)
ABSOLUTE MONO #: 0.52 K/UL (ref 0.1–1.2)
ANION GAP SERPL CALCULATED.3IONS-SCNC: 13 MMOL/L (ref 9–17)
BASOPHILS # BLD: 1 % (ref 0–2)
BASOPHILS ABSOLUTE: 0.06 K/UL (ref 0–0.2)
BUN BLDV-MCNC: 7 MG/DL (ref 6–20)
BUN/CREAT BLD: 14 (ref 9–20)
CALCIUM SERPL-MCNC: 8.2 MG/DL (ref 8.6–10.4)
CHLORIDE BLD-SCNC: 106 MMOL/L (ref 98–107)
CO2: 22 MMOL/L (ref 20–31)
CREAT SERPL-MCNC: 0.5 MG/DL (ref 0.7–1.2)
DIFFERENTIAL TYPE: ABNORMAL
EOSINOPHILS RELATIVE PERCENT: 3 % (ref 1–4)
GFR AFRICAN AMERICAN: >60 ML/MIN
GFR NON-AFRICAN AMERICAN: >60 ML/MIN
GFR SERPL CREATININE-BSD FRML MDRD: ABNORMAL ML/MIN/{1.73_M2}
GFR SERPL CREATININE-BSD FRML MDRD: ABNORMAL ML/MIN/{1.73_M2}
GLUCOSE BLD-MCNC: 99 MG/DL (ref 70–99)
HCT VFR BLD CALC: 32.6 % (ref 40.7–50.3)
HEMOGLOBIN: 10.2 G/DL (ref 13–17)
IMMATURE GRANULOCYTES: 2 %
LYMPHOCYTES # BLD: 21 % (ref 24–43)
MCH RBC QN AUTO: 30.7 PG (ref 25.2–33.5)
MCHC RBC AUTO-ENTMCNC: 31.3 G/DL (ref 28.4–34.8)
MCV RBC AUTO: 98.2 FL (ref 82.6–102.9)
MONOCYTES # BLD: 7 % (ref 3–12)
NRBC AUTOMATED: 0 PER 100 WBC
PDW BLD-RTO: 12.9 % (ref 11.8–14.4)
PLATELET # BLD: 219 K/UL (ref 138–453)
PLATELET ESTIMATE: ABNORMAL
PMV BLD AUTO: 9.3 FL (ref 8.1–13.5)
POTASSIUM SERPL-SCNC: 3.9 MMOL/L (ref 3.7–5.3)
RBC # BLD: 3.32 M/UL (ref 4.21–5.77)
RBC # BLD: ABNORMAL 10*6/UL
SEG NEUTROPHILS: 66 % (ref 36–65)
SEGMENTED NEUTROPHILS ABSOLUTE COUNT: 4.73 K/UL (ref 1.5–8.1)
SODIUM BLD-SCNC: 141 MMOL/L (ref 135–144)
WBC # BLD: 7.2 K/UL (ref 3.5–11.3)
WBC # BLD: ABNORMAL 10*3/UL

## 2020-07-17 PROCEDURE — 6360000002 HC RX W HCPCS

## 2020-07-17 PROCEDURE — 2000000000 HC ICU R&B

## 2020-07-17 PROCEDURE — 6360000002 HC RX W HCPCS: Performed by: INTERNAL MEDICINE

## 2020-07-17 PROCEDURE — 6360000002 HC RX W HCPCS: Performed by: FAMILY MEDICINE

## 2020-07-17 PROCEDURE — 2580000003 HC RX 258: Performed by: FAMILY MEDICINE

## 2020-07-17 PROCEDURE — 94640 AIRWAY INHALATION TREATMENT: CPT

## 2020-07-17 PROCEDURE — 2500000003 HC RX 250 WO HCPCS: Performed by: INTERNAL MEDICINE

## 2020-07-17 PROCEDURE — 2700000000 HC OXYGEN THERAPY PER DAY

## 2020-07-17 PROCEDURE — 85025 COMPLETE CBC W/AUTO DIFF WBC: CPT

## 2020-07-17 PROCEDURE — 0BJ08ZZ INSPECTION OF TRACHEOBRONCHIAL TREE, VIA NATURAL OR ARTIFICIAL OPENING ENDOSCOPIC: ICD-10-PCS | Performed by: INTERNAL MEDICINE

## 2020-07-17 PROCEDURE — 6370000000 HC RX 637 (ALT 250 FOR IP): Performed by: PSYCHIATRY & NEUROLOGY

## 2020-07-17 PROCEDURE — 36415 COLL VENOUS BLD VENIPUNCTURE: CPT

## 2020-07-17 PROCEDURE — 94761 N-INVAS EAR/PLS OXIMETRY MLT: CPT

## 2020-07-17 PROCEDURE — 6370000000 HC RX 637 (ALT 250 FOR IP): Performed by: FAMILY MEDICINE

## 2020-07-17 PROCEDURE — 80048 BASIC METABOLIC PNL TOTAL CA: CPT

## 2020-07-17 PROCEDURE — 71045 X-RAY EXAM CHEST 1 VIEW: CPT

## 2020-07-17 PROCEDURE — 31622 DX BRONCHOSCOPE/WASH: CPT

## 2020-07-17 RX ORDER — FENTANYL CITRATE 50 UG/ML
INJECTION, SOLUTION INTRAMUSCULAR; INTRAVENOUS
Status: COMPLETED | OUTPATIENT
Start: 2020-07-17 | End: 2020-07-17

## 2020-07-17 RX ORDER — MIDAZOLAM HYDROCHLORIDE 1 MG/ML
INJECTION INTRAMUSCULAR; INTRAVENOUS
Status: DISPENSED
Start: 2020-07-17 | End: 2020-07-17

## 2020-07-17 RX ORDER — LIDOCAINE HYDROCHLORIDE 10 MG/ML
5 INJECTION, SOLUTION EPIDURAL; INFILTRATION; INTRACAUDAL; PERINEURAL ONCE
Status: COMPLETED | OUTPATIENT
Start: 2020-07-17 | End: 2020-07-17

## 2020-07-17 RX ORDER — MIDAZOLAM HYDROCHLORIDE 1 MG/ML
INJECTION INTRAMUSCULAR; INTRAVENOUS
Status: COMPLETED | OUTPATIENT
Start: 2020-07-17 | End: 2020-07-17

## 2020-07-17 RX ORDER — MIDAZOLAM HYDROCHLORIDE 1 MG/ML
INJECTION INTRAMUSCULAR; INTRAVENOUS
Status: DISCONTINUED
Start: 2020-07-17 | End: 2020-07-17 | Stop reason: WASHOUT

## 2020-07-17 RX ORDER — FENTANYL CITRATE 50 UG/ML
INJECTION, SOLUTION INTRAMUSCULAR; INTRAVENOUS
Status: DISPENSED
Start: 2020-07-17 | End: 2020-07-17

## 2020-07-17 RX ADMIN — AMITRIPTYLINE HYDROCHLORIDE 25 MG: 25 TABLET, FILM COATED ORAL at 20:49

## 2020-07-17 RX ADMIN — MIDAZOLAM 2 MG: 1 INJECTION INTRAMUSCULAR; INTRAVENOUS at 11:47

## 2020-07-17 RX ADMIN — IPRATROPIUM BROMIDE AND ALBUTEROL SULFATE 1 AMPULE: .5; 3 SOLUTION RESPIRATORY (INHALATION) at 10:56

## 2020-07-17 RX ADMIN — SODIUM CHLORIDE: 9 INJECTION, SOLUTION INTRAVENOUS at 05:04

## 2020-07-17 RX ADMIN — PIPERACILLIN AND TAZOBACTAM 3.38 G: 3; .375 INJECTION, POWDER, LYOPHILIZED, FOR SOLUTION INTRAVENOUS at 12:14

## 2020-07-17 RX ADMIN — PIPERACILLIN AND TAZOBACTAM 3.38 G: 3; .375 INJECTION, POWDER, LYOPHILIZED, FOR SOLUTION INTRAVENOUS at 03:40

## 2020-07-17 RX ADMIN — IPRATROPIUM BROMIDE AND ALBUTEROL SULFATE 1 AMPULE: .5; 3 SOLUTION RESPIRATORY (INHALATION) at 07:43

## 2020-07-17 RX ADMIN — Medication 50 MCG: at 11:32

## 2020-07-17 RX ADMIN — Medication 50 MCG: at 11:47

## 2020-07-17 RX ADMIN — ENOXAPARIN SODIUM 40 MG: 40 INJECTION SUBCUTANEOUS at 09:05

## 2020-07-17 RX ADMIN — LIDOCAINE HYDROCHLORIDE 5 ML: 10 INJECTION, SOLUTION EPIDURAL; INFILTRATION; INTRACAUDAL; PERINEURAL at 12:14

## 2020-07-17 RX ADMIN — MIDAZOLAM 2 MG: 1 INJECTION INTRAMUSCULAR; INTRAVENOUS at 11:31

## 2020-07-17 RX ADMIN — IPRATROPIUM BROMIDE AND ALBUTEROL SULFATE 1 AMPULE: .5; 3 SOLUTION RESPIRATORY (INHALATION) at 20:19

## 2020-07-17 RX ADMIN — IPRATROPIUM BROMIDE AND ALBUTEROL SULFATE 1 AMPULE: .5; 3 SOLUTION RESPIRATORY (INHALATION) at 15:48

## 2020-07-17 RX ADMIN — PIPERACILLIN AND TAZOBACTAM 3.38 G: 3; .375 INJECTION, POWDER, LYOPHILIZED, FOR SOLUTION INTRAVENOUS at 19:33

## 2020-07-17 ASSESSMENT — PULMONARY FUNCTION TESTS
PIF_VALUE: 15
PIF_VALUE: 25
PIF_VALUE: 19
PIF_VALUE: 23
PIF_VALUE: 20
PIF_VALUE: 24
PIF_VALUE: 23
PIF_VALUE: 22

## 2020-07-17 ASSESSMENT — PAIN SCALES - GENERAL: PAINLEVEL_OUTOF10: 0

## 2020-07-17 NOTE — PROGRESS NOTES
[address] Hospitalist        2020   9:59 PM    Name:  Scottie Griffiths  MRN:    [id number]     [place]:     [de-identified]   Room:  [address] [place] [address] Day: 7     Admit Date: 2020 11:34 AM  PCP: Goran Plaza MD    C/C:   Chief Complaint   Patient presents with    Shortness of Breath     yesterday       Assessment:      ·  aspiration pneumonia  ·  acute on Chronic hypoxic respiratory failure, status post tracheostomy  · Sepsis   · Left lower lobe pneumonia  · Amyotrophic lateral sclerosis  · Moderate malnourishment  · Tobacco use        Plan:        ·  patient is currently in ICU  · Mechanical ventilation as per Critical Care  · Patient   Got bedside bronchoscopy on 2020   · Neurology consult for ALS  · Continue amitriptyline  · IV fluids  · Palliative care following for pain control  · IV Zosyn and vancomycin  · Pulmonology following, plan for bronchoscopy tomorrow noted  · DVT and GI prophylaxis  · Continue to monitor/telemetry/CBC with differential daily/BMP daily      Subjective:     Patient seen and examined at bedside. patient usually talks by writing. Patient has been complaining of worsening secretion   No acute events overnight. Afebrile  Mother was present at the bedside    ROS:  A 10 point system reviewed and negative otherwise mentioned above. Physical Examination:      Vitals:  /79   Pulse 76   Temp 97.8 °F (36.6 °C) (Oral)   Resp 23   Ht 5' 10\" (1.778 m)   Wt 135 lb 8 oz (61.5 kg)   SpO2 94%   BMI 19.44 kg/m²   Temp (24hrs), Av.3 °F (36.8 °C), Min:97.8 °F (36.6 °C), Max:98.7 °F (37.1 °C)    Weight:   Wt Readings from Last 3 Encounters:   20 135 lb 8 oz (61.5 kg)   20 134 lb (60.8 kg)   20 130 lb (59 kg)     I/O last 3 completed shifts:  I/O last 3 completed shifts: In: 1936.7 [P.O.:50; I.V.:1811; IV Piggyback:75.7]  Out: 775 [Urine:775]     No results for input(s): POCGLU in the last 72 hours.       General appearance - Sleepy  Mental status - unable to assess  Head - normocephalic and atraumatic  Eyes - pupils equal and reactive, extraocular eye movements intact, conjunctiva clear  Ears - hearing appears to be intact  Nose - no drainage noted  Mouth - mucous membranes moist  Neck -  tracheostomy  Chest - clear to auscultation, normal effort  Heart - normal rate, regular rhythm, no murmur  Abdomen - soft, nontender, nondistended, bowel sounds present all four quadrants, no masses, hepatomegaly or splenomegaly  Neurological - normal speech, no focal findings or movement disorder noted, cranial nerves II through XII grossly intact  Extremities - peripheral pulses palpable, no pedal edema or calf pain with palpation  Skin - no gross lesions, rashes, or induration noted        Medications: Allergies:    Allergies   Allergen Reactions    Sulfa Antibiotics Other (See Comments)     As child (unknown reaction)       Current Meds:   Current Facility-Administered Medications:     LORazepam (ATIVAN) injection 0.5 mg, 0.5 mg, Intravenous, Q4H PRN, Therese Alba MD    amitriptyline (ELAVIL) tablet 25 mg, 25 mg, Oral, Nightly, Marco Bassett MD, 25 mg at 07/16/20 2046    potassium chloride (KLOR-CON M) extended release tablet 40 mEq, 40 mEq, Oral, PRN, 40 mEq at 07/13/20 0608 **OR** potassium bicarb-citric acid (EFFER-K) effervescent tablet 40 mEq, 40 mEq, Oral, PRN, 40 mEq at 07/16/20 1039 **OR** potassium chloride 10 mEq/100 mL IVPB (Peripheral Line), 10 mEq, Intravenous, PRN, Fredy Johnson MD    morphine sulfate (PF) injection 4 mg, 4 mg, Intravenous, Q4H PRN, Zeynep Painter MD    HYDROcodone-acetaminophen (NORCO) 5-325 MG per tablet 1 tablet, 1 tablet, Oral, Q4H PRN, Zeynep Painter MD    0.9 % sodium chloride infusion, , Intravenous, Continuous, Zeynep Painter MD, Last Rate: 75 mL/hr at 07/16/20 0314    sodium chloride flush 0.9 % injection 10 mL, 10 mL, Intravenous, 2 times per day, Shirl Siemens, MD, Stopped at 07/10/20 2103   sodium chloride flush 0.9 % injection 10 mL, 10 mL, Intravenous, PRN, Zeynep Painter MD    acetaminophen (TYLENOL) tablet 650 mg, 650 mg, Oral, Q6H PRN **OR** acetaminophen (TYLENOL) suppository 650 mg, 650 mg, Rectal, Q6H PRN, Zeynep Painter MD    magnesium hydroxide (MILK OF MAGNESIA) 400 MG/5ML suspension 30 mL, 30 mL, Oral, Daily PRN, Zeynep Painter MD    promethazine (PHENERGAN) tablet 12.5 mg, 12.5 mg, Oral, Q6H PRN **OR** ondansetron (ZOFRAN) injection 4 mg, 4 mg, Intravenous, Q6H PRN, Zeynep Painter MD    enoxaparin (LOVENOX) injection 40 mg, 40 mg, Subcutaneous, Daily, Zeynep Painter MD, 40 mg at 07/16/20 0856    albuterol (PROVENTIL) nebulizer solution 2.5 mg, 2.5 mg, Nebulization, Q2H PRN, Zeynep Painter MD    ipratropium-albuterol (DUONEB) nebulizer solution 1 ampule, 1 ampule, Inhalation, Q4H WA, Zeynep Painter MD, 1 ampule at 07/16/20 1813    piperacillin-tazobactam (ZOSYN) 3.375 g in dextrose 5 % 50 mL IVPB extended infusion (mini-bag), 3.375 g, Intravenous, Q8H, Zeynep Painter MD, Last Rate: 12.5 mL/hr at 07/16/20 2045, 3.375 g at 07/16/20 2045      I/O (24Hr):     Intake/Output Summary (Last 24 hours) at 7/16/2020 2159  Last data filed at 7/16/2020 1659  Gross per 24 hour   Intake 1876.71 ml   Output 1850 ml   Net 26.71 ml       Data:           Labs:    Hematology:  Recent Labs     07/14/20  0502 07/15/20  0424 07/16/20  0504   WBC 6.7 7.0 6.5   RBC 3.18* 3.59* 3.24*   HGB 9.8* 10.7* 10.0*   HCT 31.2* 35.2* 31.5*   MCV 98.1 98.1 97.2   MCH 30.8 29.8 30.9   MCHC 31.4 30.4 31.7   RDW 12.9 13.0 13.0    236 194   MPV 9.0 9.2 9.8     Chemistry:  Recent Labs     07/14/20  0502 07/15/20  0424 07/16/20  0504    146* 144   K 3.7 3.8 3.5*    103 104   CO2 31 33* 29   GLUCOSE 91 99 103*   BUN 7 10 8   CREATININE 0.47* 0.51* 0.49*   MG  --   --  1.9   ANIONGAP 10 10 11   LABGLOM >60 >60 >60   GFRAA >60 >60 >60   CALCIUM 8.8 9.0 8.7     No results for input(s): PROT, LABALBU, LABA1C, L0IDZEW, X8AXTPY, FT4, TSH, AST, ALT, LDH, GGT, ALKPHOS, LABGGT, BILITOT, BILIDIR, AMMONIA, AMYLASE, LIPASE, LACTATE, CHOL, HDL, LDLCHOLESTEROL, CHOLHDLRATIO, TRIG, VLDL, UMO43NB, PHENYTOIN, PHENYF, URICACID, POCGLU in the last 72 hours. Lab Results   Component Value Date/Time    SPECIAL NOT REPORTED 07/14/2020 12:43 PM    SPECIAL NOT REPORTED 07/14/2020 12:43 PM    SPECIAL NOT REPORTED 07/14/2020 12:43 PM     Lab Results   Component Value Date/Time    CULTURE  07/14/2020 12:43 PM     GRAM NEGATIVE RODS 400 CFU/ML Susceptibility testing not performed on low colony count organisms. CULTURE PENDING 07/14/2020 12:43 PM       Lab Results   Component Value Date    POCPH 7.39 07/10/2020    POCPCO2 43 07/10/2020    POCPO2 92 07/10/2020    POCHCO3 25.7 07/10/2020    NBEA NOT REPORTED 07/10/2020    PBEA 1 07/10/2020    QVG6ZGK 27 07/10/2020    LTJS1YMB 97 07/10/2020    FIO2 NOT REPORTED 07/10/2020       Radiology:    Xr Chest Portable    Result Date: 7/14/2020  Left basilar effusion with adjacent left basilar consolidation consistent with pneumonia. Xr Chest Portable    Result Date: 7/12/2020  Worsening left basilar opacity     Xr Chest Portable    Result Date: 7/10/2020  Small increased left lung base opacity is nonspecific and may represent atelectasis with pneumonia or aspiration not excluded. Xr Chest Portable    Result Date: 7/9/2020  Acute airspace disease at the left lung base concerning for pneumonia or aspiration. Radiographic follow-up is recommended. Ct Chest Pulmonary Embolism W Contrast    Result Date: 7/9/2020  1. Debris in the trachea predominantly extends into the left mainstem bronchus with associated consolidative opacity in the left lower lobe and inferior lingula, suggestive of aspiration pneumonitis. There is also debris in the right lower lobe segmental bronchi without associated airspace disease. 2.  No evidence for acute pulmonary embolism.      Fl Modified Barium Swallow W Video    Result Date: 7/10/2020  1. Flash penetration with the thin liquid substance by cup without aspiration. 2. No penetration or aspiration with the remainder of the administered substances/attempts. Please see separate speech pathology report for full discussion of findings and recommendations. All radiological studies reviewed  Code Status:  DNR-CCA        Electronically signed by Petty Scott MD on 7/16/2020 at 9:59 PM    This note was created with the assistance of a speech-recognition program.  Although the intention is to generate a document that actually reflects the content of the visit, no guarantees can be provided that every mistake has been identified and corrected by editing. Note was updated later by me after  physical examination and  completion of the assessment.

## 2020-07-17 NOTE — PROGRESS NOTES
Pulmonary Critical Care Progress Note  Jude Dumont MD     Patient seen for the follow up of Acute on chronic hypoxic and hypercarbic respiratory failure, pneumonia, aspiration, ALS, remote history of smoking    Subjective:  Patient had uneventful night. Positive tracheal secretions. He denies any chest pain. He is tolerating diet. His shortness of breath is getting better. He is tolerating switch to Trelegy. Examination:  Vitals: /75   Pulse 82   Temp 97.3 °F (36.3 °C) (Oral)   Resp 24   Ht 5' 10\" (1.778 m)   Wt 131 lb (59.4 kg)   SpO2 100%   BMI 18.80 kg/m²   General appearance:  alert and cooperative with exam  Neck: No JVD  Lungs: Basal crackles, moderate air exchange  Heart: regular rate and rhythm, S1, S2 normal, no gallop  Abdomen: Soft, non tender, + BS  Extremities: no cyanosis or clubbing.  No significant edema    LABs:  CBC:   Recent Labs     07/16/20  0504 07/17/20  0349   WBC 6.5 7.2   HGB 10.0* 10.2*   HCT 31.5* 32.6*    219     BMP:   Recent Labs     07/16/20  0504 07/17/20  0349    141   K 3.5* 3.9   CO2 29 22   BUN 8 7   CREATININE 0.49* 0.50*   LABGLOM >60 >60   GLUCOSE 103* 99       Lab Results   Component Value Date    POCPH 7.39 07/10/2020    POCPCO2 43 07/10/2020    POCPO2 92 07/10/2020    POCHCO3 25.7 07/10/2020    NBEA NOT REPORTED 07/10/2020    PBEA 1 07/10/2020    XPB8EKS 27 07/10/2020    XNKT5COG 97 07/10/2020    FIO2 NOT REPORTED 07/10/2020     Radiology:  7/17/2020      Impression:  · Acute on chronic hypoxic and hypercarbic respiratory failure  · LLL pneumonia  · Aspiration  · ALS s/p recent trach  · ~10-pack-year smoking, during his 25s    Recommendations:  · Continue IV antibiotics, zoysyn  · Discontinue IV fluids  · Albuterol and Ipratropium Q 4 hours and prn  · X-ray chest in am  · Labs: CBC and BMP in am  · Vent support, increase use of home trilogy machine  · Dr. Calvin Lutz, neurology input noted for worsening neuromuscular weakness  · Moises Young

## 2020-07-17 NOTE — PROGRESS NOTES
[address] Hospitalist        2020   7:37 PM    Name:  Doug Lombardo  MRN:    [id number]     [place]:     [de-identified]   Room:  [address] [place] [address] Day: 8     Admit Date: 2020 11:34 AM  PCP: Sia Dumas MD    C/C:   Chief Complaint   Patient presents with    Shortness of Breath     yesterday       Assessment:      ·  aspiration pneumonia  ·  acute on Chronic hypoxic respiratory failure, status post tracheostomy  · Sepsis   · Left lower lobe pneumonia  · Amyotrophic lateral sclerosis  · Moderate malnourishment  · Tobacco use        Plan:        ·  patient is currently in ICU  · Mechanical ventilation as per Critical Care  · Patient   Got bedside bronchoscopy on 2020   · Neurology consult for ALS  · Continue amitriptyline  · IV fluids  · Palliative care following for pain control  · IV Zosyn and vancomycin  · Pulmonology following, plan for bronchoscopy Today noted  · DVT and GI prophylaxis  · Continue to monitor/telemetry/CBC with differential daily/BMP daily      Subjective:     Patient seen and examined at bedside. patient usually talks by writing. Patient has been complaining of worsening secretion   No acute events overnight. Afebrile  Mother was present at the bedside    ROS:  A 10 point system reviewed and negative otherwise mentioned above. Physical Examination:      Vitals:  /77   Pulse 95   Temp 99 °F (37.2 °C) (Temporal)   Resp 20   Ht 5' 10\" (1.778 m)   Wt 131 lb (59.4 kg)   SpO2 94%   BMI 18.80 kg/m²   Temp (24hrs), Av.8 °F (36.6 °C), Min:97.3 °F (36.3 °C), Max:99 °F (37.2 °C)    Weight:   Wt Readings from Last 3 Encounters:   20 131 lb (59.4 kg)   20 134 lb (60.8 kg)   20 130 lb (59 kg)     I/O last 3 completed shifts:  I/O last 3 completed shifts: In: .1 [P.O.:50; I.V.:1876.4; IV Piggyback:133.8]  Out: 2900 [Urine:2900]     No results for input(s): POCGLU in the last 72 hours.       General appearance - Sleepy  Mental status - unable to assess  Head - normocephalic and atraumatic  Eyes - pupils equal and reactive, extraocular eye movements intact, conjunctiva clear  Ears - hearing appears to be intact  Nose - no drainage noted  Mouth - mucous membranes moist  Neck -  tracheostomy  Chest - clear to auscultation, normal effort  Heart - normal rate, regular rhythm, no murmur  Abdomen - soft, nontender, nondistended, bowel sounds present all four quadrants, no masses, hepatomegaly or splenomegaly  Neurological - normal speech, no focal findings or movement disorder noted, cranial nerves II through XII grossly intact  Extremities - peripheral pulses palpable, no pedal edema or calf pain with palpation  Skin - no gross lesions, rashes, or induration noted        Medications: Allergies:    Allergies   Allergen Reactions    Sulfa Antibiotics Other (See Comments)     As child (unknown reaction)       Current Meds:   Current Facility-Administered Medications:     fentaNYL (SUBLIMAZE) 100 MCG/2ML injection, , , ,     LORazepam (ATIVAN) injection 0.5 mg, 0.5 mg, Intravenous, Q4H PRN, Dipika Tse MD    amitriptyline (ELAVIL) tablet 25 mg, 25 mg, Oral, Nightly, Adalberto Hurley MD, 25 mg at 07/16/20 2046    potassium chloride (KLOR-CON M) extended release tablet 40 mEq, 40 mEq, Oral, PRN, 40 mEq at 07/13/20 0608 **OR** potassium bicarb-citric acid (EFFER-K) effervescent tablet 40 mEq, 40 mEq, Oral, PRN, 40 mEq at 07/16/20 1039 **OR** potassium chloride 10 mEq/100 mL IVPB (Peripheral Line), 10 mEq, Intravenous, PRN, Yamile Salinas MD    morphine sulfate (PF) injection 4 mg, 4 mg, Intravenous, Q4H PRN, Zeynep Painter MD    HYDROcodone-acetaminophen (NORCO) 5-325 MG per tablet 1 tablet, 1 tablet, Oral, Q4H PRN, Zeynep Painter MD    0.9 % sodium chloride infusion, , Intravenous, Continuous, Zeynep Painter MD, Last Rate: 75 mL/hr at 07/17/20 0504    sodium chloride flush 0.9 % injection 10 mL, 10 mL, Intravenous, 2 times per day, Siena Haley MD, Stopped at 07/10/20 2103    sodium chloride flush 0.9 % injection 10 mL, 10 mL, Intravenous, PRN, Zeynep Painter MD    acetaminophen (TYLENOL) tablet 650 mg, 650 mg, Oral, Q6H PRN **OR** acetaminophen (TYLENOL) suppository 650 mg, 650 mg, Rectal, Q6H PRN, Zeynep Painter MD    magnesium hydroxide (MILK OF MAGNESIA) 400 MG/5ML suspension 30 mL, 30 mL, Oral, Daily PRN, Zeynep Painter MD    promethazine (PHENERGAN) tablet 12.5 mg, 12.5 mg, Oral, Q6H PRN **OR** ondansetron (ZOFRAN) injection 4 mg, 4 mg, Intravenous, Q6H PRN, Zeynep Painter MD    enoxaparin (LOVENOX) injection 40 mg, 40 mg, Subcutaneous, Daily, Zeynep Painter MD, 40 mg at 07/17/20 0905    albuterol (PROVENTIL) nebulizer solution 2.5 mg, 2.5 mg, Nebulization, Q2H PRN, Zeynep Painter MD    ipratropium-albuterol (DUONEB) nebulizer solution 1 ampule, 1 ampule, Inhalation, Q4H WA, Zeynep Painter MD, 1 ampule at 07/17/20 1548    piperacillin-tazobactam (ZOSYN) 3.375 g in dextrose 5 % 50 mL IVPB extended infusion (mini-bag), 3.375 g, Intravenous, Q8H, Zeynep Painter MD, Last Rate: 12.5 mL/hr at 07/17/20 1933, 3.375 g at 07/17/20 1933      I/O (24Hr):     Intake/Output Summary (Last 24 hours) at 7/17/2020 1937  Last data filed at 7/17/2020 1837  Gross per 24 hour   Intake 1771.1 ml   Output 1950 ml   Net -178.9 ml       Data:           Labs:    Hematology:  Recent Labs     07/15/20  0424 07/16/20  0504 07/17/20  0349   WBC 7.0 6.5 7.2   RBC 3.59* 3.24* 3.32*   HGB 10.7* 10.0* 10.2*   HCT 35.2* 31.5* 32.6*   MCV 98.1 97.2 98.2   MCH 29.8 30.9 30.7   MCHC 30.4 31.7 31.3   RDW 13.0 13.0 12.9    194 219   MPV 9.2 9.8 9.3     Chemistry:  Recent Labs     07/15/20  0424 07/16/20  0504 07/17/20  0349   * 144 141   K 3.8 3.5* 3.9    104 106   CO2 33* 29 22   GLUCOSE 99 103* 99   BUN 10 8 7   CREATININE 0.51* 0.49* 0.50*   MG  --  1.9  --    ANIONGAP 10 11 13   LABGLOM >60 >60 >60   GFRAA >60 >60 >60   CALCIUM 9.0 8.7 pulmonary embolism. Fl Modified Barium Swallow W Video    Result Date: 7/10/2020  1. Flash penetration with the thin liquid substance by cup without aspiration. 2. No penetration or aspiration with the remainder of the administered substances/attempts. Please see separate speech pathology report for full discussion of findings and recommendations. All radiological studies reviewed  Code Status:  DNR-CCA        Electronically signed by Ibeth Fonseca MD on 7/17/2020 at 7:37 PM    This note was created with the assistance of a speech-recognition program.  Although the intention is to generate a document that actually reflects the content of the visit, no guarantees can be provided that every mistake has been identified and corrected by editing. Note was updated later by me after  physical examination and  completion of the assessment.

## 2020-07-17 NOTE — OP NOTE
Operative Note    Procedure Note: Bronchoscopy  Peg Wheatley MD     Pre-op Diagnosis: Mucous plugging/atelectasis  Post-op Diagnosis: Mucous plugging/atelectasis  Bronchoscopist: Peg Wheatley MD  Anesthesia: Conscious Sedation. Total Dose: Versed -4 mgs  Fentanyl -100 micrograms  Procedure: Flexible fiberoptic bronchoscopy    Indications and History  The patient is a 53 y.o. male with left lower lobe atelectasis/mucous plugging/ALS  (Please see today's progress notes for the latest issues,  physical exam and lab data)    Consent to Procedure  The risks, benefits, complications, treatment options and expected outcomes were discussed with the patient. The possibilities of reaction to medication, pulmonary aspiration, perforation of a viscus, bleeding, failure to diagnose a condition and creating a complication requiring transfusion or operation were discussed with the patient, who freely signed the consent. Description of Procedure  The patient was intubated on mechanical ventilation and placed on 100% oxygen. Christophe Reardon was monitored by the Critical Nursing and Respiratory therapy staff and the standard ICU monitoring devices. Christophe Caron and the procedure were verified as Flexible Fiberoptic Bronchoscopy. A Time Out was held and the above information confirmed. The bronchoscope was then passed into the trachea via the trach. Lidocaine 1% solution 2 ml at a time was applied topically to the sadi. After careful inspection of the tracheal, the bronchoscope was sequentially passed into all segments of right and left endobronchial trees to the second and/or third divisions. Endobronchial findings  Distal trachea: Mild inflammatory changes were noted with mild tracheomalcia. CarinaByrd Reji and mobile with no mass. Right endobronchial trees:  The endobronchial survey was significant for   · No mass  · No bleeding  · Mild inflammation  · No significant bronchomalcia  · moderate mucous plugging  ·   Left endobronchial trees:  The endobronchial survey was significant for   · No mass  · No bleeding  · Mild inflammation  · No significant bronchomalcia   · Large amount of thick white mucous plugging in LLL    Estimated Blood Loss: None  Complications  None    Specimens Taken: None    Electronically signed by     Susanne Leblanc MD, [facility]  Pulmonary Critical Care and Sleep Medicine,  [facility]  Cell: [phone]  Office: [phone]    7/17/2020 at 12:16 PM

## 2020-07-18 ENCOUNTER — APPOINTMENT (OUTPATIENT)
Dept: GENERAL RADIOLOGY | Age: 55
DRG: 870 | End: 2020-07-18
Payer: MEDICARE

## 2020-07-18 LAB
ANION GAP SERPL CALCULATED.3IONS-SCNC: 12 MMOL/L (ref 9–17)
BNP INTERPRETATION: NORMAL
BUN BLDV-MCNC: 7 MG/DL (ref 6–20)
BUN/CREAT BLD: 12 (ref 9–20)
CALCIUM SERPL-MCNC: 9.1 MG/DL (ref 8.6–10.4)
CHLORIDE BLD-SCNC: 105 MMOL/L (ref 98–107)
CO2: 25 MMOL/L (ref 20–31)
CREAT SERPL-MCNC: 0.59 MG/DL (ref 0.7–1.2)
GFR AFRICAN AMERICAN: >60 ML/MIN
GFR NON-AFRICAN AMERICAN: >60 ML/MIN
GFR SERPL CREATININE-BSD FRML MDRD: ABNORMAL ML/MIN/{1.73_M2}
GFR SERPL CREATININE-BSD FRML MDRD: ABNORMAL ML/MIN/{1.73_M2}
GLUCOSE BLD-MCNC: 102 MG/DL (ref 70–99)
HCT VFR BLD CALC: 36.3 % (ref 40.7–50.3)
HEMOGLOBIN: 11.3 G/DL (ref 13–17)
MCH RBC QN AUTO: 30.5 PG (ref 25.2–33.5)
MCHC RBC AUTO-ENTMCNC: 31.1 G/DL (ref 28.4–34.8)
MCV RBC AUTO: 98.1 FL (ref 82.6–102.9)
NRBC AUTOMATED: 0 PER 100 WBC
PDW BLD-RTO: 13.2 % (ref 11.8–14.4)
PLATELET # BLD: 210 K/UL (ref 138–453)
PMV BLD AUTO: 8.6 FL (ref 8.1–13.5)
POTASSIUM SERPL-SCNC: 4 MMOL/L (ref 3.7–5.3)
PRO-BNP: 230 PG/ML
RBC # BLD: 3.7 M/UL (ref 4.21–5.77)
SODIUM BLD-SCNC: 142 MMOL/L (ref 135–144)
WBC # BLD: 8.6 K/UL (ref 3.5–11.3)

## 2020-07-18 PROCEDURE — 2000000000 HC ICU R&B

## 2020-07-18 PROCEDURE — 6360000002 HC RX W HCPCS: Performed by: FAMILY MEDICINE

## 2020-07-18 PROCEDURE — 94761 N-INVAS EAR/PLS OXIMETRY MLT: CPT

## 2020-07-18 PROCEDURE — 85027 COMPLETE CBC AUTOMATED: CPT

## 2020-07-18 PROCEDURE — 2700000000 HC OXYGEN THERAPY PER DAY

## 2020-07-18 PROCEDURE — 6370000000 HC RX 637 (ALT 250 FOR IP): Performed by: PSYCHIATRY & NEUROLOGY

## 2020-07-18 PROCEDURE — 83880 ASSAY OF NATRIURETIC PEPTIDE: CPT

## 2020-07-18 PROCEDURE — 99232 SBSQ HOSP IP/OBS MODERATE 35: CPT | Performed by: PSYCHIATRY & NEUROLOGY

## 2020-07-18 PROCEDURE — 36415 COLL VENOUS BLD VENIPUNCTURE: CPT

## 2020-07-18 PROCEDURE — 94640 AIRWAY INHALATION TREATMENT: CPT

## 2020-07-18 PROCEDURE — 71045 X-RAY EXAM CHEST 1 VIEW: CPT

## 2020-07-18 PROCEDURE — 6360000002 HC RX W HCPCS: Performed by: INTERNAL MEDICINE

## 2020-07-18 PROCEDURE — 2580000003 HC RX 258: Performed by: FAMILY MEDICINE

## 2020-07-18 PROCEDURE — 94770 HC ETCO2 MONITOR DAILY: CPT

## 2020-07-18 PROCEDURE — 80048 BASIC METABOLIC PNL TOTAL CA: CPT

## 2020-07-18 RX ORDER — ACETYLCYSTEINE 200 MG/ML
600 SOLUTION ORAL; RESPIRATORY (INHALATION) 2 TIMES DAILY
Status: DISCONTINUED | OUTPATIENT
Start: 2020-07-18 | End: 2020-07-19 | Stop reason: HOSPADM

## 2020-07-18 RX ORDER — ALBUTEROL SULFATE 2.5 MG/3ML
2.5 SOLUTION RESPIRATORY (INHALATION) EVERY 6 HOURS PRN
Status: DISCONTINUED | OUTPATIENT
Start: 2020-07-18 | End: 2020-07-19 | Stop reason: HOSPADM

## 2020-07-18 RX ADMIN — ACETYLCYSTEINE 600 MG: 200 SOLUTION ORAL; RESPIRATORY (INHALATION) at 20:02

## 2020-07-18 RX ADMIN — AMITRIPTYLINE HYDROCHLORIDE 25 MG: 25 TABLET, FILM COATED ORAL at 20:01

## 2020-07-18 RX ADMIN — PIPERACILLIN AND TAZOBACTAM 3.38 G: 3; .375 INJECTION, POWDER, LYOPHILIZED, FOR SOLUTION INTRAVENOUS at 20:02

## 2020-07-18 RX ADMIN — ENOXAPARIN SODIUM 40 MG: 40 INJECTION SUBCUTANEOUS at 10:00

## 2020-07-18 RX ADMIN — PIPERACILLIN AND TAZOBACTAM 3.38 G: 3; .375 INJECTION, POWDER, LYOPHILIZED, FOR SOLUTION INTRAVENOUS at 12:09

## 2020-07-18 RX ADMIN — ALBUTEROL SULFATE 2.5 MG: 2.5 SOLUTION RESPIRATORY (INHALATION) at 20:02

## 2020-07-18 RX ADMIN — PIPERACILLIN AND TAZOBACTAM 3.38 G: 3; .375 INJECTION, POWDER, LYOPHILIZED, FOR SOLUTION INTRAVENOUS at 04:49

## 2020-07-18 ASSESSMENT — PULMONARY FUNCTION TESTS
PIF_VALUE: 22
PIF_VALUE: 22
PIF_VALUE: 24
PIF_VALUE: 20
PIF_VALUE: 22
PIF_VALUE: 20

## 2020-07-18 ASSESSMENT — PAIN SCALES - GENERAL
PAINLEVEL_OUTOF10: 0

## 2020-07-18 NOTE — PLAN OF CARE

## 2020-07-18 NOTE — PROGRESS NOTES
nebulizer 3 times daily  · Tracheostomy care  · Pulmonary hygiene  ·  zoysyn  · Check bronchoscopy cultures  · Repeat chest x-ray  · Nutritional support  · Neurology on consult  · Physical therapy  · Discharge planning home patient refused LTAC  · Discussed with RN and RT  · DVT prophylaxis with low molecular weight heparin    Rafa Hayes MD, [facility]  Pulmonary Critical Care and Sleep Medicine,  [facility]: [phone]

## 2020-07-18 NOTE — PLAN OF CARE
Problem: Skin Integrity:  Goal: Will show no infection signs and symptoms  Description: Will show no infection signs and symptoms  Outcome: Ongoing  Note: Monitor for signs & symptoms of infection. Utilize standard precautions & proper handwashing. Communicate referral to infection control. Goal: Absence of new skin breakdown  Description: Absence of new skin breakdown  Outcome: Ongoing  Note: Continuing to monitor for skin integrity risks. Patient independent with turning/repositioning. Turning/repositioning encouraged at least once every 2 hrs, and prn basis. Hygiene care being completed independently per patient; assistance provided when deemed necessary. Problem: Falls - Risk of:  Goal: Will remain free from falls  Description: Will remain free from falls  Outcome: Ongoing  Note: Pt fall risk, fall band present, falling star, safety alarm activated and in use as needed. Hourly rounding performed. Pt encouraged to use call light. See Courtney fall risk assessment. Goal: Absence of physical injury  Description: Absence of physical injury  Outcome: Ongoing  Note: Non-skid socks in place, up with assistance, bed in lowest position, bed exit & alarm as needed, provide toileting every 2 hours an d as needed. Problem: Nutrition  Goal: Optimal nutrition therapy  Outcome: Ongoing  Note: Review diet daily and as needed with pt. Provide supplement nutrition as ordered by physician & educate pt. Review nutritional guidelines with pt.

## 2020-07-18 NOTE — PROGRESS NOTES
proprioception     Cerebellar  No involuntary movements or tremors     Reflex function Intact 2+ DTR and symmetric. Gait                  Not tested         Lab Results   Component Value Date    LDLCHOLESTEROL 75 10/19/2019     No components found for: CHLPL  Lab Results   Component Value Date    TRIG 64 10/19/2019     Lab Results   Component Value Date    HDL 42 10/19/2019     No results found for: LDLCALC  No results found for: LABVLDL  Lab Results   Component Value Date    LABA1C 5.9 10/21/2019     Lab Results   Component Value Date     10/21/2019     Lab Results   Component Value Date    GZPHASNX98 145 10/18/2019      Neurological work up:  CT head  CTA head and neck  MRI brain   2 D echo     Assessment and Recommendations:   Limb onset of amyotrophic lateral sclerosis  Status post tracheostomy  Left lower lobe pneumonia    The patient is status post bronchoscopy. Recommend continued amitriptyline 25 mg p.o. nightly  Okay to be discharged from neurological standpoint. Outpatient follow-up with neurology clinic as scheduled in next 4 to 6 weeks  We will sign off. Please call with questions. Thank you for the consult. Mile Irvin MD  Neurology    This note is created with the assistance of a speech-recognition program. While intending to generate a document that actually reflects the content of the visit, the document can still have some errors including those of syntax and sound a- like substitutions which may escape proofreading. In such instances, actual meaning can be extrapolated by contextual derivation.

## 2020-07-19 VITALS
OXYGEN SATURATION: 94 % | WEIGHT: 131 LBS | HEART RATE: 84 BPM | SYSTOLIC BLOOD PRESSURE: 113 MMHG | RESPIRATION RATE: 20 BRPM | DIASTOLIC BLOOD PRESSURE: 82 MMHG | BODY MASS INDEX: 18.75 KG/M2 | TEMPERATURE: 98.6 F | HEIGHT: 70 IN

## 2020-07-19 LAB
ANION GAP SERPL CALCULATED.3IONS-SCNC: 12 MMOL/L (ref 9–17)
BUN BLDV-MCNC: 13 MG/DL (ref 6–20)
BUN/CREAT BLD: 20 (ref 9–20)
CALCIUM SERPL-MCNC: 8.7 MG/DL (ref 8.6–10.4)
CHLORIDE BLD-SCNC: 105 MMOL/L (ref 98–107)
CO2: 23 MMOL/L (ref 20–31)
CREAT SERPL-MCNC: 0.65 MG/DL (ref 0.7–1.2)
GFR AFRICAN AMERICAN: >60 ML/MIN
GFR NON-AFRICAN AMERICAN: >60 ML/MIN
GFR SERPL CREATININE-BSD FRML MDRD: ABNORMAL ML/MIN/{1.73_M2}
GFR SERPL CREATININE-BSD FRML MDRD: ABNORMAL ML/MIN/{1.73_M2}
GLUCOSE BLD-MCNC: 102 MG/DL (ref 70–99)
HCT VFR BLD CALC: 35.9 % (ref 40.7–50.3)
HEMOGLOBIN: 11.6 G/DL (ref 13–17)
MCH RBC QN AUTO: 30.8 PG (ref 25.2–33.5)
MCHC RBC AUTO-ENTMCNC: 32.3 G/DL (ref 28.4–34.8)
MCV RBC AUTO: 95.2 FL (ref 82.6–102.9)
NRBC AUTOMATED: 0 PER 100 WBC
PDW BLD-RTO: 13.3 % (ref 11.8–14.4)
PLATELET # BLD: 211 K/UL (ref 138–453)
PMV BLD AUTO: 8.8 FL (ref 8.1–13.5)
POTASSIUM SERPL-SCNC: 3.8 MMOL/L (ref 3.7–5.3)
RBC # BLD: 3.77 M/UL (ref 4.21–5.77)
SODIUM BLD-SCNC: 140 MMOL/L (ref 135–144)
WBC # BLD: 12.5 K/UL (ref 3.5–11.3)

## 2020-07-19 PROCEDURE — 2580000003 HC RX 258: Performed by: FAMILY MEDICINE

## 2020-07-19 PROCEDURE — 2700000000 HC OXYGEN THERAPY PER DAY

## 2020-07-19 PROCEDURE — 6360000002 HC RX W HCPCS: Performed by: INTERNAL MEDICINE

## 2020-07-19 PROCEDURE — 6360000002 HC RX W HCPCS: Performed by: FAMILY MEDICINE

## 2020-07-19 PROCEDURE — 85027 COMPLETE CBC AUTOMATED: CPT

## 2020-07-19 PROCEDURE — 94761 N-INVAS EAR/PLS OXIMETRY MLT: CPT

## 2020-07-19 PROCEDURE — 80048 BASIC METABOLIC PNL TOTAL CA: CPT

## 2020-07-19 PROCEDURE — 36415 COLL VENOUS BLD VENIPUNCTURE: CPT

## 2020-07-19 PROCEDURE — 94640 AIRWAY INHALATION TREATMENT: CPT

## 2020-07-19 RX ORDER — CEFUROXIME AXETIL 250 MG/1
500 TABLET ORAL 2 TIMES DAILY
Qty: 28 TABLET | Refills: 0 | Status: SHIPPED | OUTPATIENT
Start: 2020-07-19 | End: 2020-07-26

## 2020-07-19 RX ORDER — AMITRIPTYLINE HYDROCHLORIDE 25 MG/1
25 TABLET, FILM COATED ORAL NIGHTLY
Qty: 30 TABLET | Refills: 0 | Status: SHIPPED | OUTPATIENT
Start: 2020-07-19

## 2020-07-19 RX ORDER — ALBUTEROL SULFATE 2.5 MG/3ML
2.5 SOLUTION RESPIRATORY (INHALATION) EVERY 6 HOURS PRN
Qty: 120 EACH | Refills: 3 | Status: SHIPPED | OUTPATIENT
Start: 2020-07-19

## 2020-07-19 RX ORDER — HYDROCODONE BITARTRATE AND ACETAMINOPHEN 5; 325 MG/1; MG/1
1 TABLET ORAL EVERY 4 HOURS PRN
Qty: 20 TABLET | Refills: 0 | Status: SHIPPED | OUTPATIENT
Start: 2020-07-19 | End: 2020-07-26

## 2020-07-19 RX ORDER — ACETYLCYSTEINE 200 MG/ML
600 SOLUTION ORAL; RESPIRATORY (INHALATION) 2 TIMES DAILY
Qty: 60 ML | Refills: 0 | Status: SHIPPED | OUTPATIENT
Start: 2020-07-19 | End: 2020-07-29

## 2020-07-19 RX ADMIN — PIPERACILLIN AND TAZOBACTAM 3.38 G: 3; .375 INJECTION, POWDER, LYOPHILIZED, FOR SOLUTION INTRAVENOUS at 04:22

## 2020-07-19 RX ADMIN — PIPERACILLIN AND TAZOBACTAM 3.38 G: 3; .375 INJECTION, POWDER, LYOPHILIZED, FOR SOLUTION INTRAVENOUS at 11:50

## 2020-07-19 RX ADMIN — ALBUTEROL SULFATE 2.5 MG: 2.5 SOLUTION RESPIRATORY (INHALATION) at 09:44

## 2020-07-19 RX ADMIN — ENOXAPARIN SODIUM 40 MG: 40 INJECTION SUBCUTANEOUS at 09:15

## 2020-07-19 RX ADMIN — ACETYLCYSTEINE 600 MG: 200 SOLUTION ORAL; RESPIRATORY (INHALATION) at 09:44

## 2020-07-19 ASSESSMENT — PULMONARY FUNCTION TESTS
PIF_VALUE: 24
PIF_VALUE: 23
PIF_VALUE: 25

## 2020-07-19 ASSESSMENT — PAIN SCALES - GENERAL: PAINLEVEL_OUTOF10: 0

## 2020-07-19 NOTE — PROGRESS NOTES
[address] Hospitalist        2020   8:13 PM    Name:  Valentino Haley  MRN:    [id number]     Jessicaide:     [de-identified]   Room:  [address] [place] [address] Day: 5     Admit Date: 2020 11:34 AM  PCP: Pauline Clifton MD    C/C:   Chief Complaint   Patient presents with    Shortness of Breath     yesterday       Assessment:      ·  aspiration pneumonia  ·  acute on Chronic hypoxic respiratory failure, status post tracheostomy  · Sepsis   · Left lower lobe pneumonia  · Amyotrophic lateral sclerosis  · Moderate malnourishment  · Tobacco use        Plan:        ·  patient is currently in ICU  · Mechanical ventilation as per Critical Care  · Patient   Got bedside bronchoscopy on 2020   · Neurology consult for ALS  · Continue amitriptyline  · IV fluids  · Palliative care following for pain control Mucomyst nebulizer  · IV Zosyn   · Pulmonology following, plan for bronchoscopy Today noted  · DVT and GI prophylaxis  · Continue to monitor/telemetry/CBC with differential daily/BMP daily      Subjective:     Patient seen and examined at bedside. patient usually talks by writing. Patient is feeling better  No acute events overnight. Afebrile  Mother was present at the bedside    ROS:  A 10 point system reviewed and negative otherwise mentioned above. Physical Examination:      Vitals:  /80   Pulse 82   Temp 97.4 °F (36.3 °C) (Infrared)   Resp 24   Ht 5' 10\" (1.778 m)   Wt 131 lb (59.4 kg)   SpO2 94%   BMI 18.80 kg/m²   Temp (24hrs), Av.1 °F (36.7 °C), Min:97.3 °F (36.3 °C), Max:99 °F (37.2 °C)    Weight:   Wt Readings from Last 3 Encounters:   20 131 lb (59.4 kg)   20 134 lb (60.8 kg)   20 130 lb (59 kg)     I/O last 3 completed shifts:  I/O last 3 completed shifts: In: 830 [I.V.:830]  Out: 2650 [Urine:2650]     No results for input(s): POCGLU in the last 72 hours.       General appearance -   Sleepy  Mental status - unable to assess  Head - normocephalic and atraumatic  Eyes - pupils equal and reactive, extraocular eye movements intact, conjunctiva clear  Ears - hearing appears to be intact  Nose - no drainage noted  Mouth - mucous membranes moist  Neck -  tracheostomy  Chest - clear to auscultation, normal effort  Heart - normal rate, regular rhythm, no murmur  Abdomen - soft, nontender, nondistended, bowel sounds present all four quadrants, no masses, hepatomegaly or splenomegaly  Neurological - normal speech, no focal findings or movement disorder noted, cranial nerves II through XII grossly intact  Extremities - peripheral pulses palpable, no pedal edema or calf pain with palpation  Skin - no gross lesions, rashes, or induration noted        Medications: Allergies:    Allergies   Allergen Reactions    Sulfa Antibiotics Other (See Comments)     As child (unknown reaction)       Current Meds:   Current Facility-Administered Medications:     albuterol (PROVENTIL) nebulizer solution 2.5 mg, 2.5 mg, Nebulization, Q6H PRN, Gela Fontana MD, 2.5 mg at 07/18/20 2002    acetylcysteine (MUCOMYST) 20 % solution 600 mg, 600 mg, Inhalation, BID, Gela Fontana MD, 600 mg at 07/18/20 2002    LORazepam (ATIVAN) injection 0.5 mg, 0.5 mg, Intravenous, Q4H PRN, Aditi Thompson MD    amitriptyline (ELAVIL) tablet 25 mg, 25 mg, Oral, Nightly, Jaida Harmon MD, 25 mg at 07/18/20 2001    potassium chloride (KLOR-CON M) extended release tablet 40 mEq, 40 mEq, Oral, PRN, 40 mEq at 07/13/20 0608 **OR** potassium bicarb-citric acid (EFFER-K) effervescent tablet 40 mEq, 40 mEq, Oral, PRN, 40 mEq at 07/16/20 1039 **OR** potassium chloride 10 mEq/100 mL IVPB (Peripheral Line), 10 mEq, Intravenous, PRN, Anne Harris MD    morphine sulfate (PF) injection 4 mg, 4 mg, Intravenous, Q4H PRN, Zeynep Painter MD    HYDROcodone-acetaminophen (NORCO) 5-325 MG per tablet 1 tablet, 1 tablet, Oral, Q4H PRN, Zeynep Painter MD    0.9 % sodium chloride infusion, , R2ICOXX, FT4, TSH, AST, ALT, LDH, GGT, ALKPHOS, LABGGT, BILITOT, BILIDIR, AMMONIA, AMYLASE, LIPASE, LACTATE, CHOL, HDL, LDLCHOLESTEROL, CHOLHDLRATIO, TRIG, VLDL, SVP19EG, PHENYTOIN, PHENYF, URICACID, POCGLU in the last 72 hours. Lab Results   Component Value Date/Time    SPECIAL NOT REPORTED 07/14/2020 12:43 PM    SPECIAL NOT REPORTED 07/14/2020 12:43 PM    SPECIAL NOT REPORTED 07/14/2020 12:43 PM     Lab Results   Component Value Date/Time    CULTURE  07/14/2020 12:43 PM     GRAM NEGATIVE RODS 400 CFU/ML Susceptibility testing not performed on low colony count organisms. CULTURE PENDING 07/14/2020 12:43 PM       Lab Results   Component Value Date    POCPH 7.39 07/10/2020    POCPCO2 43 07/10/2020    POCPO2 92 07/10/2020    POCHCO3 25.7 07/10/2020    NBEA NOT REPORTED 07/10/2020    PBEA 1 07/10/2020    XQA7ZYX 27 07/10/2020    DHQS3YXQ 97 07/10/2020    FIO2 NOT REPORTED 07/10/2020       Radiology:    Xr Chest Portable    Result Date: 7/14/2020  Left basilar effusion with adjacent left basilar consolidation consistent with pneumonia. Xr Chest Portable    Result Date: 7/12/2020  Worsening left basilar opacity     Xr Chest Portable    Result Date: 7/10/2020  Small increased left lung base opacity is nonspecific and may represent atelectasis with pneumonia or aspiration not excluded. Xr Chest Portable    Result Date: 7/9/2020  Acute airspace disease at the left lung base concerning for pneumonia or aspiration. Radiographic follow-up is recommended. Ct Chest Pulmonary Embolism W Contrast    Result Date: 7/9/2020  1. Debris in the trachea predominantly extends into the left mainstem bronchus with associated consolidative opacity in the left lower lobe and inferior lingula, suggestive of aspiration pneumonitis. There is also debris in the right lower lobe segmental bronchi without associated airspace disease. 2.  No evidence for acute pulmonary embolism.      Fl Modified Barium Swallow W Video    Result Date: 7/10/2020  1. Flash penetration with the thin liquid substance by cup without aspiration. 2. No penetration or aspiration with the remainder of the administered substances/attempts. Please see separate speech pathology report for full discussion of findings and recommendations. All radiological studies reviewed  Code Status:  DNR-CCA        Electronically signed by Damaris Hamlin MD on 7/18/2020 at 8:13 PM    This note was created with the assistance of a speech-recognition program.  Although the intention is to generate a document that actually reflects the content of the visit, no guarantees can be provided that every mistake has been identified and corrected by editing. Note was updated later by me after  physical examination and  completion of the assessment.

## 2020-07-19 NOTE — PROGRESS NOTES
Nutrition Assessment     Type and Reason for Visit: Reassess    Nutrition Recommendations/Plan:   1. Suggest continuing on dental soft diet. 2. Consider maintaining Ensure Enlive (prefers vanilla) ONS, x 3/day. 3. Pt being D/C'd to home this evening. Nutrition Assessment:  Per Pt (communicated via notepad):  feeling better, appetite is good and is eating well @ meals. PO intake:  % (meals/ONS's). Suggest continuing on dental soft diet, with Ensure Enlive (vanilla) ONS, x 3/day. Pt being D/C'd to home this evening. Malnutrition Assessment:  Malnutrition Status: Severe malnutrition    Estimated Daily Nutrient Needs:  Energy (kcal): 6971-9243 based on 30-33 kcal/kg of admission weight; Weight Used for Energy Requirements:  Admission     Protein (g): 70-88 based on 1.2-1.5 gm/kg of admission weight; Weight Used for Protein Requirements:  Admission        Fluid (ml/day): 1 ml/kcal; Weight Used for Fluid Requirements:  Admission      Nutrition Related Findings: GI:  flat, soft, last BM 7/18, active bowel sounds; Edema:  +1 BLE (non-pitting);  Skin:  wound 7/1 sacrum mid (DTI)      Current Nutrition Therapies:    DIET DENTAL SOFT;  Dietary Nutrition Supplements: Standard High Calorie Oral Supplement    Anthropometric Measures:  · Height: 5' 10\" (177.8 cm)  · Current Body Wt: 131 lb (59.4 kg)   · BMI: 18.8    Nutrition Diagnosis:   · Inadequate oral intake related to impaired respiratory funtion as evidenced by BMI, moderate loss of subcutaneous fat, moderate muscle loss      Nutrition Interventions:   Food and/or Nutrient Delivery:  Continue Current Diet, Continue Oral Nutrition Supplement  Nutrition Education/Counseling:  Education not indicated   Coordination of Nutrition Care:  No recommendation at this time    Goals:  PO intakes to meet >75% of estimated nutrition needs       Nutrition Monitoring and Evaluation:   Food/Nutrient Intake Outcomes:  Food and Nutrient Intake, Supplement Intake  Physical Signs/Symptoms Outcomes:  Biochemical Data, Chewing or Swallowing, Fluid Status or Edema, Nutrition Focused Physical Findings, Skin, Weight     Discharge Planning:    Continue Oral Nutrition Supplement, Continue current diet     Electronically signed by Liana Guadalupe RDN, DAYNA on 7/19/20 at 5:54 PM EDT    Contact: [phone]

## 2020-07-19 NOTE — PLAN OF CARE
Problem: Falls - Risk of:  Goal: Absence of physical injury  Description: Absence of physical injury  7/19/2020 0741 by Rin Rodriguez, DI  Outcome: Ongoing  Note: Continue fall precautions including arm band identification, falling star placed outside of the room and alarm at night and when unattended. Use of ambulatory aids when indicated and frequent visual checks. Monitored orientation status. Call light within reach at all times. Bed remains in lowest locked position. Instructed to call for assistance PRN. Fall sign posted outside of door. Frequent nursing rounds continued. Fall precautions remain in place. 7/18/2020 1932 by Jose Martinez RN  Outcome: Ongoing  Note: Non-skid socks in place, up with assistance, bed in lowest position, bed exit & alarm as needed, provide toileting every 2 hours an d as needed. Problem: Skin Integrity:  Intervention: Manage non-pharmacologic comfort measures  Note: Skin assessment performed and charted. Assessed for areas of redness and or breakdown. Osvaldo scale order set in place. Waffle mattress present. Patient presented with a pre-existing wound to coccygeal area. Barrier cream applied. Patient requires a lot of encouragement to change positions, often refusing position changes. Instructed on the importance of position changes for the promotion of wound healing and the prevention of further skin breakdown. Oral mucosa assessed. Monitored for areas of redness, thrush, open lesions  and or dentition. Encouraged adequate oral intake for promotion of wound healing and maintenance of skin integrity. Problem: Nutrition  Intervention: Swallowing evaluation  Note: Encouraged adequate oral intake for promotion of healing. Monitored I&O. Offered foods that are appealing to patient. Encouraged supplemental shakes.

## 2020-07-19 NOTE — PROGRESS NOTES
nebulizer 3 times daily  · Tracheostomy care  · Pulmonary hygiene  · Discontinue zosyn 10-day course  · Negative respiratory culture  · Nutritional support  · Neurology on consult  · Physical therapy  · Discharge planning home patient refused LTAC  · Discussed with RN and RT  · Okay to discharge home from [place]  · DVT prophylaxis with low molecular weight heparin    Asif Duke MD, [facility]  Pulmonary Critical Care and Sleep Medicine,  [facility]: [phone]

## 2020-07-19 NOTE — DISCHARGE SUMMARY
[facility]    Adult Hospitalist      Patient ID: Chivo Koroma  MRN: [id number]     [place]:  [de-identified]       Patient's PCP: Cindy Rojas MD    Admit Date: 7/9/2020     Discharge Date:   7/19/20    Admitting Physician: Cindy Rojas MD    Discharge Physician: Cristina Chapin MD     CONSULTANTS: Patient Care Team:  Cindy Rojas MD as PCP - General    PROCEDURES PERFORMED: Bronchoscopy x2    Active Discharge Diagnoses:  · aspiration pneumonia  ·  acute on Chronic hypoxic respiratory failure, status post tracheostomy  · Sepsis   · Left lower lobe pneumonia  · Amyotrophic lateral sclerosis  · Moderate malnourishment  · Tobacco use      Primary Problem  <principal problem not specified>    Hospital Course:     53-year-old gentleman with past medical history of ALS presented to ER complaining of aggressive cough and shortness of breath going on for 1 day. Patient was seen by his PCP in the office and noticed that he has progressively gotten worse since his previous discharge. On his previous admission patient had a tracheostomy and he was placed on mechanical ventilation for acute on chronic respiratory failure. Patient has been complaining of dry cough. They have been suctioning green thick sputum at home. Patient also stated that he was feeling febrile. He also complaining of decreased appetite. Also noticed progressively more short of breath. Patient was sent to the ER for further evaluation and admission. Patient denies any chest pain, peripheral edema, headache, dizziness. Patient admitted for further management and ICU. Critical care was involved in the care. Patient was empirically treated for aspiration pneumonia and received IV Zosyn and vancomycin. Neurology was also consulted because of possible worsening of his MS. Patient was a started on amitriptyline and the dose was increased. Amitriptyline was continued.   Pulmonology recommended pulmonary toilet and hygiene. Patient had bronchoscopy during hospital twice. His secretions improved after the bronchoscopy. Patient also met with palliative care during hospital stay which he will continue at home however this is entirely for the purpose of pain control. Patient chose to stay full code. Patient is discharged once his increased secretions and weakness improved. Patient is discharged home on p.o. Ceftin along with home trelegy via trach. At the time of discharge patient was hemodynamically stable. His respiratory symptoms has improved to his baseline. Patient is instructed to have a close outpatient follow-up appointment with the pulmonology and neurology. The plan was discussed in detail with patient who agreed with the plan and verbalized understanding . The patient was seen and examined on day of discharge and this discharge summary is in conjunction with any daily progress note from day of discharge. Hospital Data:    Labs:    Hematology:  Recent Labs     07/17/20 0349 07/18/20  0513 07/19/20  0507   WBC 7.2 8.6 12.5*   RBC 3.32* 3.70* 3.77*   HGB 10.2* 11.3* 11.6*   HCT 32.6* 36.3* 35.9*   MCV 98.2 98.1 95.2   MCH 30.7 30.5 30.8   MCHC 31.3 31.1 32.3   RDW 12.9 13.2 13.3    210 211   MPV 9.3 8.6 8.8     Chemistry:  Recent Labs     07/17/20 0349 07/18/20  0513 07/19/20  0507    142 140   K 3.9 4.0 3.8    105 105   CO2 22 25 23   GLUCOSE 99 102* 102*   BUN 7 7 13   CREATININE 0.50* 0.59* 0.65*   ANIONGAP 13 12 12   LABGLOM >60 >60 >60   GFRAA >60 >60 >60   CALCIUM 8.2* 9.1 8.7   PROBNP  --  230  --      No results for input(s): PROT, LABALBU, LABA1C, J4PAMZE, Q5BAXPW, FT4, TSH, AST, ALT, LDH, GGT, ALKPHOS, LABGGT, BILITOT, BILIDIR, AMMONIA, AMYLASE, LIPASE, LACTATE, CHOL, HDL, LDLCHOLESTEROL, CHOLHDLRATIO, TRIG, VLDL, GGK28FJ, PHENYTOIN, PHENYF, URICACID, POCGLU in the last 72 hours.   Lab Results   Component Value Date    INR 1.0 10/18/2019    PROTIME 10.1 10/18/2019 Lab Results   Component Value Date/Time    SPECIAL NOT REPORTED 2020 12:43 PM    SPECIAL NOT REPORTED 2020 12:43 PM    SPECIAL NOT REPORTED 2020 12:43 PM     Lab Results   Component Value Date/Time    CULTURE  2020 12:43 PM     GRAM NEGATIVE RODS 400 CFU/ML Susceptibility testing not performed on low colony count organisms. CULTURE PENDING 2020 12:43 PM       Lab Results   Component Value Date    POCPH 7.39 07/10/2020    POCPCO2 43 07/10/2020    POCPO2 92 07/10/2020    POCHCO3 25.7 07/10/2020    NBEA NOT REPORTED 07/10/2020    PBEA 1 07/10/2020    EYI2HMC 27 07/10/2020    GURN7ZVW 97 07/10/2020    FIO2 NOT REPORTED 07/10/2020       Radiology:    Xr Chest Portable    Result Date: 2020  Left basilar atelectasis with possible superimposed pneumonia. Xr Chest Portable    Result Date: 2020  Stable suspected mild to moderate left-sided pleural effusion. Xr Chest Portable    Result Date: 2020  No significant interval change in volume of mild to moderate left-sided pleural effusion. Mild cardiomegaly. Xr Chest Portable    Result Date: 2020  Left basilar effusion with adjacent left basilar consolidation consistent with pneumonia. Xr Chest 1 Vw    Result Date: 7/15/2020  Left basilar consolidation is improved compared to yesterday's chest radiograph. However, apparent change may relate to differences in patient positioning given that the left basilar consolidation is very similar compared to chest radiograph from 2020.          All radiological studies reviewed      Reviews of Symptoms:    A 10 point system is reviewed and  negative except described in hospital course    Physical Exam:    Vitals:  BP 99/78   Pulse 90   Temp 98.6 °F (37 °C) (Oral)   Resp 22   Ht 5' 10\" (1.778 m)   Wt 131 lb (59.4 kg)   SpO2 92%   BMI 18.80 kg/m²   Temp (24hrs), Av.2 °F (36.8 °C), Min:97.4 °F (36.3 °C), Max:99.1 °F (37.3 °C)      General appearance - alert, well appearing, and in no acute distress  Mental status - oriented to person, place, and time with normal affect  Head - normocephalic and atraumatic  Eyes - pupils equal and reactive, extraocular eye movements intact, conjunctiva clear  Ears - hearing appears to be intact  Nose - no drainage noted  Mouth - mucous membranes moist  Neck -tracheostomy tube present  Chest - clear to auscultation, normal effort  Heart - normal rate, regular rhythm, no murmur  Abdomen - soft, nontender, nondistended, bowel sounds present all four quadrants, no masses, hepatomegaly or splenomegaly  Neurological - normal speech, no focal findings or movement disorder noted, cranial nerves II through XII grossly intact  Extremities - peripheral pulses palpable, no pedal edema or calf pain with palpation  Skin - no gross lesions, rashes, or induration noted      Consults:  PHARMACY TO DOSE VANCOMYCIN  IP CONSULT TO INTERNAL MEDICINE  IP CONSULT TO PULMONOLOGY  IP CONSULT TO PULMONOLOGY  IP CONSULT TO PALLIATIVE CARE  IP CONSULT TO NEUROLOGY    Disposition: Home with home care    Discharged Condition: Stable    Follow Up: Shabnam Warren MD  [facility] 93 [phone]            Lab Frequency Next Occurrence   Intake and output EVERY 8 HOURS    Initiate Oxygen Therapy Protocol DAILY (RT)    Pulse Oximetry Spot Check PRN    Basic Metabolic Panel w/ Reflex to MG DAILY    CBC DAILY    HHN Treatment EVERY 2 HOURS PRN    HHN Treatment EVERY 4 HOURS WHILE AWAKE (RT)    End Tidal CO2 Continuous DAILY (RT)    Mechanical ventilation CONTINUOUS WITH Q4H RT CHECKS    HHN Treatment EVERY 4 HOURS WHILE AWAKE    HHN Treatment EVERY 8 HOURS    HHN Treatment 2 TIMES DAILY          Diet: DIET DENTAL SOFT;  Dietary Nutrition Supplements: Standard High Calorie Oral Supplement    Discharge Medications:    Eloisa Holstein, [id number] [facility] Medication Instructions RLQ:[id number]    Printed on:07/19/20 9749   Medication Information acetylcysteine (MUCOMYST) 20 % nebulizer solution  Inhale 3 mLs into the lungs 2 times daily for 10 days             albuterol (PROVENTIL) (2.5 MG/3ML) 0.083% nebulizer solution  Take 3 mLs by nebulization every 6 hours as needed for Wheezing             amitriptyline (ELAVIL) 25 MG tablet  Take 1 tablet by mouth nightly             betamethasone valerate (VALISONE) 0.1 % cream  Apply topically 2 times daily Apply topically 2 times daily. cefUROXime (CEFTIN) 250 MG tablet  Take 2 tablets by mouth 2 times daily for 7 days             HYDROcodone-acetaminophen (NORCO) 5-325 MG per tablet  Take 1 tablet by mouth every 4 hours as needed for Pain for up to 7 days. Code Status:  DNR-CCA    Time Spent on discharge is  35 mins in patient examination, evaluation, counseling as well as medication reconciliation, prescriptions for required medications, discharge plan and follow up. Electronically signed by Munira Oliveira MD on 7/19/2020 at 4:45 PM     Thank you Dr. Sandy Triana MD for the opportunity to be involved in this patient's care. This note was created with the assistance of a speech-recognition program.  Although the intention is to generate a document that actually reflects the content of the visit, no guarantees can be provided that every mistake has been identified and corrected by editing. Note was updated later by me after  physical examination and  completion of the assessment.

## 2020-07-20 ENCOUNTER — CARE COORDINATION (OUTPATIENT)
Dept: CASE MANAGEMENT | Age: 55
End: 2020-07-20

## 2020-07-20 NOTE — CARE COORDINATION
[facility] Transitions Initial Follow Up Call- BPCI-A patient (- Sepsis)     Call within 2 business days of discharge: Yes    Patient: Sherry Reddy Patient : 1965   MRN: [id number]  Reason for Admission: pneumonia  Discharge Date: 20 RARS: Readmission Risk Score: 19      Last Discharge [facility]       Complaint Diagnosis Description Type Department Provider    20 Shortness of Breath Pneumonia due to organism . .. ED to Hosp-Admission (Discharged) (ADMITTED) [facility] Britney Bolanos MD; Enedina Roberto. .. Spoke with: pt wife Art Hassanarch at [employer] to pt wife who states pt doing \"good\". Writer reviewed role of BPCI bundle- v/u. Agreeable to calls   States pharmacy was closed when pt discharged so she is on her way to go get them now but 2 will need insurance approval- writer will call pharmacy to have them send PA request to PCP office to expedite the PA process  States she has not heard from home care yet- writer to call and confirm SOC/ JESUS   States he is vent dependent and has not appeared short of breath. States she did suction some sputum this morning when she changed the cannula  Denies fever/ chills  States understanding about staying home and prevention measures with regards to COVID-19   Denies needs  Encouraged to call writer/ CTC or providers if questions or concerns- v/u     Call to [facility] with Tea who confirms JESUS will be today or tomorrow   Call to [facility] who states PA will be needed for mucomyst and albuterol- writer requests these be sent to PCP office- contact information provided   [facility] with Dr Prince Spain office informing PA will need to be completed ASAP so pt can get the needed meds for his ventilator.  Requested call back to writer or pt/ wife if anything further needed     Call back to pt wife to inform [facility] [employer]. should be reaching out to them for visit today or tomorrow and I had called pharmacy to have them send PA request right to Dr Glorine Libman office and that I had left a message with Dr Glorine Libman office requesting PA be completed ASAP for the 2 meds- v/u  She states she was just at his office and scheduled appt for pt for 7/27  Denies needs  Encouraged to call writer/ CTC or providers if questions or concerns- v/u     Facility: [facility]     Non-face-to-face services provided:  Scheduled appointment with PCP-7/27  Obtained and reviewed discharge summary and/or continuity of care documents  Communication with home health agencies or other community services the patient is currently using-call to [facility] and confirmed [address] for today or tomorrow  Assessment and support for treatment adherence and medication management-call to pharmacy requesting PA for 2 meds get sent to PCP to be complete and LMAM for PCP office to complete PA for mucomyst and albuterol ASAP     Care Transitions 24 Hour Call    Do you have any ongoing symptoms?:  No  Do you have a copy of your discharge instructions?:  Yes  Do you have all of your prescriptions and are they filled?:  No  Have you been contacted by a [facility] Pharmacist?:  No  Were you discharged with any Home Care or Post Acute Services:  Yes  Post Acute Services:  Home Health (Comment: [facility] )  Do you feel like you have everything you need to keep you well at home?:  Yes  Care Transitions Interventions         Follow Up  Future Appointments   Date Time Provider Juarez Rosario   9/2/2020  8:40 AM Yogi Trammell MD Neuro Spec Kelley Leary RN

## 2020-07-20 NOTE — CARE COORDINATION
Discharge planning    Called yesterday at home regarding dc home. RN stated that patient rory has been on his own home settings wit h02 at 2 liters bled thru. Since that is his normal settings no need to have [employer] change anything. RN was updated that will need to call [facility] and fax over cristian to them. Since no note present faxed over face sheet with 7/19 dc date to start service today.

## 2020-07-27 ENCOUNTER — TELEPHONE (OUTPATIENT)
Dept: OTHER | Facility: CLINIC | Age: 55
End: 2020-07-27

## 2020-07-27 ENCOUNTER — APPOINTMENT (OUTPATIENT)
Dept: GENERAL RADIOLOGY | Age: 55
DRG: 208 | End: 2020-07-27
Payer: MEDICARE

## 2020-07-27 ENCOUNTER — HOSPITAL ENCOUNTER (INPATIENT)
Age: 55
LOS: 1 days | Discharge: HOSPICE | DRG: 208 | End: 2020-07-29
Attending: EMERGENCY MEDICINE | Admitting: FAMILY MEDICINE
Payer: MEDICARE

## 2020-07-27 LAB
ABSOLUTE EOS #: 0.09 K/UL (ref 0–0.44)
ABSOLUTE IMMATURE GRANULOCYTE: 0.08 K/UL (ref 0–0.3)
ABSOLUTE LYMPH #: 1.07 K/UL (ref 1.1–3.7)
ABSOLUTE MONO #: 0.88 K/UL (ref 0.1–1.2)
ANION GAP SERPL CALCULATED.3IONS-SCNC: 14 MMOL/L (ref 9–17)
BASOPHILS # BLD: 1 % (ref 0–2)
BASOPHILS ABSOLUTE: 0.09 K/UL (ref 0–0.2)
BUN BLDV-MCNC: 22 MG/DL (ref 6–20)
BUN/CREAT BLD: 34 (ref 9–20)
CALCIUM SERPL-MCNC: 9.8 MG/DL (ref 8.6–10.4)
CHLORIDE BLD-SCNC: 104 MMOL/L (ref 98–107)
CO2: 24 MMOL/L (ref 20–31)
CREAT SERPL-MCNC: 0.65 MG/DL (ref 0.7–1.2)
DIFFERENTIAL TYPE: ABNORMAL
EOSINOPHILS RELATIVE PERCENT: 1 % (ref 1–4)
GFR AFRICAN AMERICAN: >60 ML/MIN
GFR NON-AFRICAN AMERICAN: >60 ML/MIN
GFR SERPL CREATININE-BSD FRML MDRD: ABNORMAL ML/MIN/{1.73_M2}
GFR SERPL CREATININE-BSD FRML MDRD: ABNORMAL ML/MIN/{1.73_M2}
GLUCOSE BLD-MCNC: 110 MG/DL (ref 70–99)
HCT VFR BLD CALC: 42.4 % (ref 40.7–50.3)
HEMOGLOBIN: 13.5 G/DL (ref 13–17)
IMMATURE GRANULOCYTES: 1 %
LYMPHOCYTES # BLD: 9 % (ref 24–43)
MCH RBC QN AUTO: 30.5 PG (ref 25.2–33.5)
MCHC RBC AUTO-ENTMCNC: 31.8 G/DL (ref 28.4–34.8)
MCV RBC AUTO: 95.7 FL (ref 82.6–102.9)
MONOCYTES # BLD: 8 % (ref 3–12)
NRBC AUTOMATED: 0 PER 100 WBC
PDW BLD-RTO: 13.9 % (ref 11.8–14.4)
PLATELET # BLD: 289 K/UL (ref 138–453)
PLATELET ESTIMATE: ABNORMAL
PMV BLD AUTO: 9.3 FL (ref 8.1–13.5)
POTASSIUM SERPL-SCNC: 4.4 MMOL/L (ref 3.7–5.3)
RBC # BLD: 4.43 M/UL (ref 4.21–5.77)
RBC # BLD: ABNORMAL 10*6/UL
SARS-COV-2, PCR: NORMAL
SARS-COV-2, RAPID: NOT DETECTED
SARS-COV-2: NORMAL
SEG NEUTROPHILS: 80 % (ref 36–65)
SEGMENTED NEUTROPHILS ABSOLUTE COUNT: 9.49 K/UL (ref 1.5–8.1)
SODIUM BLD-SCNC: 142 MMOL/L (ref 135–144)
SOURCE: NORMAL
WBC # BLD: 11.7 K/UL (ref 3.5–11.3)
WBC # BLD: ABNORMAL 10*3/UL

## 2020-07-27 PROCEDURE — 6360000002 HC RX W HCPCS: Performed by: FAMILY MEDICINE

## 2020-07-27 PROCEDURE — 85025 COMPLETE CBC W/AUTO DIFF WBC: CPT

## 2020-07-27 PROCEDURE — 6360000002 HC RX W HCPCS: Performed by: NURSE PRACTITIONER

## 2020-07-27 PROCEDURE — 5A1945Z RESPIRATORY VENTILATION, 24-96 CONSECUTIVE HOURS: ICD-10-PCS | Performed by: FAMILY MEDICINE

## 2020-07-27 PROCEDURE — 71045 X-RAY EXAM CHEST 1 VIEW: CPT

## 2020-07-27 PROCEDURE — 94761 N-INVAS EAR/PLS OXIMETRY MLT: CPT

## 2020-07-27 PROCEDURE — 94640 AIRWAY INHALATION TREATMENT: CPT

## 2020-07-27 PROCEDURE — 6370000000 HC RX 637 (ALT 250 FOR IP): Performed by: FAMILY MEDICINE

## 2020-07-27 PROCEDURE — 2580000003 HC RX 258: Performed by: FAMILY MEDICINE

## 2020-07-27 PROCEDURE — G0378 HOSPITAL OBSERVATION PER HR: HCPCS

## 2020-07-27 PROCEDURE — 2580000003 HC RX 258: Performed by: NURSE PRACTITIONER

## 2020-07-27 PROCEDURE — U0002 COVID-19 LAB TEST NON-CDC: HCPCS

## 2020-07-27 PROCEDURE — 80048 BASIC METABOLIC PNL TOTAL CA: CPT

## 2020-07-27 PROCEDURE — 2700000000 HC OXYGEN THERAPY PER DAY

## 2020-07-27 PROCEDURE — 96365 THER/PROPH/DIAG IV INF INIT: CPT

## 2020-07-27 PROCEDURE — 99285 EMERGENCY DEPT VISIT HI MDM: CPT

## 2020-07-27 RX ORDER — ONDANSETRON 2 MG/ML
4 INJECTION INTRAMUSCULAR; INTRAVENOUS EVERY 6 HOURS PRN
Status: DISCONTINUED | OUTPATIENT
Start: 2020-07-27 | End: 2020-07-29 | Stop reason: HOSPADM

## 2020-07-27 RX ORDER — SODIUM CHLORIDE 0.9 % (FLUSH) 0.9 %
10 SYRINGE (ML) INJECTION PRN
Status: DISCONTINUED | OUTPATIENT
Start: 2020-07-27 | End: 2020-07-29 | Stop reason: HOSPADM

## 2020-07-27 RX ORDER — SODIUM CHLORIDE 0.9 % (FLUSH) 0.9 %
10 SYRINGE (ML) INJECTION EVERY 12 HOURS SCHEDULED
Status: DISCONTINUED | OUTPATIENT
Start: 2020-07-27 | End: 2020-07-29 | Stop reason: HOSPADM

## 2020-07-27 RX ORDER — ALBUTEROL SULFATE 2.5 MG/3ML
2.5 SOLUTION RESPIRATORY (INHALATION) EVERY 6 HOURS PRN
Status: DISCONTINUED | OUTPATIENT
Start: 2020-07-27 | End: 2020-07-29 | Stop reason: HOSPADM

## 2020-07-27 RX ORDER — POTASSIUM CHLORIDE 20 MEQ/1
40 TABLET, EXTENDED RELEASE ORAL PRN
Status: DISCONTINUED | OUTPATIENT
Start: 2020-07-27 | End: 2020-07-29 | Stop reason: HOSPADM

## 2020-07-27 RX ORDER — AMITRIPTYLINE HYDROCHLORIDE 25 MG/1
25 TABLET, FILM COATED ORAL NIGHTLY
Status: DISCONTINUED | OUTPATIENT
Start: 2020-07-27 | End: 2020-07-29 | Stop reason: HOSPADM

## 2020-07-27 RX ORDER — POTASSIUM CHLORIDE 7.45 MG/ML
10 INJECTION INTRAVENOUS PRN
Status: DISCONTINUED | OUTPATIENT
Start: 2020-07-27 | End: 2020-07-29 | Stop reason: HOSPADM

## 2020-07-27 RX ORDER — PROMETHAZINE HYDROCHLORIDE 12.5 MG/1
12.5 TABLET ORAL EVERY 6 HOURS PRN
Status: DISCONTINUED | OUTPATIENT
Start: 2020-07-27 | End: 2020-07-29 | Stop reason: HOSPADM

## 2020-07-27 RX ORDER — ACETAMINOPHEN 325 MG/1
650 TABLET ORAL EVERY 6 HOURS PRN
Status: DISCONTINUED | OUTPATIENT
Start: 2020-07-27 | End: 2020-07-29 | Stop reason: HOSPADM

## 2020-07-27 RX ORDER — NICOTINE 21 MG/24HR
1 PATCH, TRANSDERMAL 24 HOURS TRANSDERMAL DAILY PRN
Status: DISCONTINUED | OUTPATIENT
Start: 2020-07-27 | End: 2020-07-29 | Stop reason: HOSPADM

## 2020-07-27 RX ORDER — MAGNESIUM SULFATE 1 G/100ML
1 INJECTION INTRAVENOUS PRN
Status: DISCONTINUED | OUTPATIENT
Start: 2020-07-27 | End: 2020-07-29 | Stop reason: HOSPADM

## 2020-07-27 RX ORDER — ACETYLCYSTEINE 200 MG/ML
600 SOLUTION ORAL; RESPIRATORY (INHALATION) 2 TIMES DAILY
Status: DISCONTINUED | OUTPATIENT
Start: 2020-07-27 | End: 2020-07-29 | Stop reason: HOSPADM

## 2020-07-27 RX ORDER — ACETAMINOPHEN 650 MG/1
650 SUPPOSITORY RECTAL EVERY 6 HOURS PRN
Status: DISCONTINUED | OUTPATIENT
Start: 2020-07-27 | End: 2020-07-29 | Stop reason: HOSPADM

## 2020-07-27 RX ORDER — POLYETHYLENE GLYCOL 3350 17 G/17G
17 POWDER, FOR SOLUTION ORAL DAILY PRN
Status: DISCONTINUED | OUTPATIENT
Start: 2020-07-27 | End: 2020-07-29 | Stop reason: HOSPADM

## 2020-07-27 RX ADMIN — AMITRIPTYLINE HYDROCHLORIDE 25 MG: 25 TABLET, FILM COATED ORAL at 22:35

## 2020-07-27 RX ADMIN — PIPERACILLIN SODIUM AND TAZOBACTAM SODIUM 4.5 G: 4; .5 INJECTION, POWDER, LYOPHILIZED, FOR SOLUTION INTRAVENOUS at 18:37

## 2020-07-27 RX ADMIN — ALBUTEROL SULFATE 2.5 MG: 2.5 SOLUTION RESPIRATORY (INHALATION) at 22:34

## 2020-07-27 RX ADMIN — ACETYLCYSTEINE 600 MG: 200 SOLUTION ORAL; RESPIRATORY (INHALATION) at 22:34

## 2020-07-27 RX ADMIN — SODIUM CHLORIDE, PRESERVATIVE FREE 10 ML: 5 INJECTION INTRAVENOUS at 22:36

## 2020-07-27 ASSESSMENT — ENCOUNTER SYMPTOMS
SINUS PRESSURE: 0
RHINORRHEA: 0
VOMITING: 0
CONSTIPATION: 0
COUGH: 0
WHEEZING: 0
DIARRHEA: 0
SHORTNESS OF BREATH: 0
ABDOMINAL PAIN: 0
COLOR CHANGE: 0
NAUSEA: 0
SORE THROAT: 0

## 2020-07-27 ASSESSMENT — PULMONARY FUNCTION TESTS: PIF_VALUE: 27

## 2020-07-27 ASSESSMENT — PAIN SCALES - GENERAL: PAINLEVEL_OUTOF10: 0

## 2020-07-27 NOTE — ED PROVIDER NOTES
EMERGENCY DEPARTMENT ENCOUNTER   ATTENDING ATTESTATION     Pt Name: Nadia Taylor  MRN: [id number]  [place] 1965  Date of evaluation: 7/27/20   Nadia Taylor is a 53 y.o. male with CC: Shortness of Breath    MDM:   Patient with history of ALS, has trach on vent, recent admission for respiratory failure and bronchoscopy. Here with shortness of breath, sent by pulmonary physician Dr. Mariano Lilly for repeat bronchoscopy. They are requesting COVID test, will check basic blood work and x-ray. No bleeding from the trach site according to the wife. Afebrile and nontoxic here. No acute distress. We will plan for admission according to his pulmonary doctor. CRITICAL CARE:       EKG: All EKG's are interpreted by the Emergency Department Physician who either signs or Co-signs this chart in the absence of a cardiologist.      RADIOLOGY:All plain film, CT, MRI, and formal ultrasound images (except ED bedside ultrasound) are read by the radiologist, see reports below, unless otherwise noted in MDM or here. XR CHEST PORTABLE   Final Result   There appears to be rotation of the patient's tracheostomy tube towards the   left. Please correlate with positioning in the trachea. No definite acute   process is seen. LABS: All lab results were reviewed by myself, and all abnormals are listed below.   Labs Reviewed   CBC WITH AUTO DIFFERENTIAL - Abnormal; Notable for the following components:       Result Value    WBC 11.7 (*)     Seg Neutrophils 80 (*)     Lymphocytes 9 (*)     Immature Granulocytes 1 (*)     Segs Absolute 9.49 (*)     Absolute Lymph # 1.07 (*)     All other components within normal limits   BASIC METABOLIC PANEL - Abnormal; Notable for the following components:    Glucose 110 (*)     BUN 22 (*)     CREATININE 0.65 (*)     Bun/Cre Ratio 34 (*)     All other components within normal limits   COVID-19     CONSULTS:  IP CONSULT TO INTERNAL MEDICINE  IP CONSULT TO PULMONOLOGY  FINAL IMPRESSION    No diagnosis found. PASTMEDICAL HISTORY     Past Medical History:   Diagnosis Date    Amyotrophic lateral sclerosis (ALS) ([facility] [address]     Back pain     on 10/18/19 pt denies pain mgmnt    Degenerative disc disease      SURGICAL HISTORY       Past Surgical History:   Procedure Laterality Date    APPENDECTOMY      COLONOSCOPY  2017    CYSTOSCOPY  10/05/2016    LITHOTRIPSY Right 10/19/2016    LUMBAR FUSION  2018    LUMBAR INTERBODY FUSION POSTERIOR L3-4    [place] LUMBAR SPINE FUSN,POST INTRBDY N/A 2018    LUMBAR INTERBODY FUSION POSTERIOR L3-4,  ([address] - OFFICE TO NOTIFY, ROTATING AARON TABLE, C-ARM) performed by Ceasar Obregon MD at [facility] 2020    TRACHEOSTOMY PERCUTANEOUS performed by Roopa Terrell MD at [facility]       Previous Medications    ACETYLCYSTEINE (MUCOMYST) 20 % NEBULIZER SOLUTION    Inhale 3 mLs into the lungs 2 times daily for 10 days    ALBUTEROL (PROVENTIL) (2.5 MG/3ML) 0.083% NEBULIZER SOLUTION    Take 3 mLs by nebulization every 6 hours as needed for Wheezing    AMITRIPTYLINE (ELAVIL) 25 MG TABLET    Take 1 tablet by mouth nightly    BETAMETHASONE VALERATE (VALISONE) 0.1 % CREAM    Apply topically 2 times daily Apply topically 2 times daily. ALLERGIES     is allergic to sulfa antibiotics. FAMILY HISTORY     He indicated that his mother is alive. SOCIAL HISTORY       Social History     Tobacco Use    Smoking status: Former Smoker     Years: 5.00     Types: Cigarettes     Last attempt to quit:      Years since quittin.5    Smokeless tobacco: Never Used   Substance Use Topics    Alcohol use: Not Currently     Comment: very seldom    Drug use: No       I personally evaluated and examined the patient in conjunction with the APC and agree with the assessment, treatment plan, and disposition of the patient as recorded by the APC.    Juan Sauceda MD  Attending Emergency Physician Molly Rico MD  07/27/20 [id number]

## 2020-07-27 NOTE — ED PROVIDER NOTES
[facility] ED  eMERGENCY dEPARTMENT eNCOUnter      Pt Name: Rosario Morgan  MRN: [id number]  Ivonnegfnestor 1965  Date of evaluation: 7/27/2020  Provider: Naveen Garza NP, APRN - [place] [id number]       Chief Complaint   Patient presents with    Shortness of Breath         HISTORY OF PRESENT ILLNESS  (Location/Symptom, Timing/Onset, Context/Setting, Quality, Duration, Modifying Factors, Severity.)   Rosario Morgan is a 53 y.o. male who presents to the emergency department by private vehicle for evaluation of shortness of breath. Patient was seen and evaluated for shortness of breath by his primary care physician today Dr. Mar Khan in addition to the pulmonologist Dr. Karuna Uribe. Dr. Karuna Uribe said that he was in respiratory distress in his office and he wanted him to come to the emergency room to be COVID tested and evaluated. Nursing Notes were reviewed. ALLERGIES     Sulfa antibiotics    CURRENT MEDICATIONS       Previous Medications    ACETYLCYSTEINE (MUCOMYST) 20 % NEBULIZER SOLUTION    Inhale 3 mLs into the lungs 2 times daily for 10 days    ALBUTEROL (PROVENTIL) (2.5 MG/3ML) 0.083% NEBULIZER SOLUTION    Take 3 mLs by nebulization every 6 hours as needed for Wheezing    AMITRIPTYLINE (ELAVIL) 25 MG TABLET    Take 1 tablet by mouth nightly    BETAMETHASONE VALERATE (VALISONE) 0.1 % CREAM    Apply topically 2 times daily Apply topically 2 times daily.        PAST MEDICAL HISTORY         Diagnosis Date    Amyotrophic lateral sclerosis (ALS) ([address]     Back pain     on 10/18/19 pt denies pain mgmnt    Degenerative disc disease        SURGICAL HISTORY           Procedure Laterality Date    APPENDECTOMY      COLONOSCOPY  2017    CYSTOSCOPY  10/05/2016    LITHOTRIPSY Right 10/19/2016    LUMBAR FUSION  05/30/2018    LUMBAR INTERBODY FUSION POSTERIOR L3-4    [place] LUMBAR SPINE FUSN,POST INTRBDY N/A 5/30/2018    LUMBAR INTERBODY FUSION POSTERIOR L3-4,  ([address] - OFFICE TO NOTIFY, normal.      Pupils: Pupils are equal, round, and reactive to light. Neck:      Musculoskeletal: Normal range of motion and neck supple. Cardiovascular:      Rate and Rhythm: Normal rate and regular rhythm. Pulmonary:      Effort: Pulmonary effort is normal. No respiratory distress. Breath sounds: No stridor. Examination of the right-lower field reveals decreased breath sounds. Examination of the left-lower field reveals decreased breath sounds. Decreased breath sounds present. Abdominal:      General: Bowel sounds are normal.      Palpations: Abdomen is soft. Musculoskeletal: Normal range of motion. Lymphadenopathy:      Cervical: No cervical adenopathy. Skin:     General: Skin is warm and dry. Findings: No rash. Neurological:      Mental Status: He is alert and oriented to person, place, and time. RADIOLOGY:   Non-plain film images such as CT, Ultrasound and MRI are read by the radiologist. [place] radiographic images are visualized and preliminarily interpreted by the emergency physician with the below findings:    Ct Abdomen Pelvis Wo Contrast Additional Contrast? None    Result Date: 7/2/2020  EXAMINATION: CT OF THE ABDOMEN AND PELVIS WITHOUT CONTRAST 7/2/2020 5:45 pm TECHNIQUE: CT of the abdomen and pelvis was performed without the administration of intravenous contrast. Multiplanar reformatted images are provided for review. Dose modulation, iterative reconstruction, and/or weight based adjustment of the mA/kV was utilized to reduce the radiation dose to as low as reasonably achievable. COMPARISON: 06/07/2020 HISTORY: ORDERING SYSTEM PROVIDED HISTORY: STONE PROTOCOL TECHNOLOGIST PROVIDED HISTORY: STONE PROTOCOL Reason for Exam: F/U renal stone seen on prior CT dated 6/7. Hx lumbar fusion, appendectomy, and previous lithotripsy.  Type of Exam: Subsequent/Follow-up FINDINGS: Lower Chest: Heart size is normal.  Small amount of opacity present in the base of the left lower lobe either atelectasis or pneumonia. No pleural effusion. Organs: Liver is normal in appearance without evident focal lesion on the noncontrast images. A few tiny nonobstructing calculi are present bilaterally in each kidney. No hydronephrosis present. The other abdominal organs appear normal. GI/Bowel: Large amount of ingested food within the stomach. Appendix is surgically absent. Normal amount of stool. No acute GI abnormality. Pelvis: Prostate is mildly enlarged. Urinary bladder appears normal.  No free fluid or adenopathy. Peritoneum/Retroperitoneum: No mass, adenopathy, or ascites. Normal aorta. Bones/Soft Tissues: No acute abnormality. Prior lumbar surgery. Intact spinal hardware. No acute intra-abdominal abnormality identified. Resolution of the hydronephrosis previously present on the left. Tiny bilateral nonobstructing renal calculi present. Xr Chest Portable    Result Date: 7/27/2020  EXAMINATION: ONE XRAY VIEW OF THE CHEST 7/27/2020 4:52 pm COMPARISON: Chest July 18, 2020. HISTORY: ORDERING SYSTEM PROVIDED HISTORY: Chest Pain TECHNOLOGIST PROVIDED HISTORY: Chest Pain Reason for Exam: shortness of breath Acuity: Acute Type of Exam: Initial FINDINGS: There appears to be rotation of the patient's tracheostomy tube towards the left. Heart appears normal in size. There appears to be chronic elevation left hemidiaphragm with associated atelectasis. No pneumothorax, large pleural effusion or free air. There appears to be rotation of the patient's tracheostomy tube towards the left. Please correlate with positioning in the trachea. No definite acute process is seen. Xr Chest Portable    Result Date: 7/18/2020  EXAMINATION: ONE XRAY VIEW OF THE CHEST 7/18/2020 2:51 pm COMPARISON: July 17, 2020 HISTORY: ORDERING SYSTEM PROVIDED HISTORY: Pneumonia TECHNOLOGIST PROVIDED HISTORY: Pneumonia Reason for Exam: Pneumonia Acuity: Unknown Type of Exam: Unknown FINDINGS: Tracheostomy.   Elevated left hemidiaphragm. Probable left basilar atelectasis. Superimposed pneumonia not excluded. Clear right lung. Left basilar atelectasis with possible superimposed pneumonia. Xr Chest Portable    Result Date: 7/17/2020  EXAMINATION: ONE XRAY VIEW OF THE CHEST 7/17/2020 5:12 am COMPARISON: Chest portable July 16, 2020 at 0535 hours HISTORY: ORDERING SYSTEM PROVIDED HISTORY: infiltrate TECHNOLOGIST PROVIDED HISTORY: infiltrate Reason for Exam: infiltrate status check Acuity: Unknown Type of Exam: Unknown FINDINGS: Tracheostomy tube is again noted in place without evidence of malalignment or complication identified. The heart is normal in size and configuration. The mediastinal contours are within normal limits. Suspected left basilar mild to moderate pleural effusion is noted with adjacent compressive atelectasis seen. Lungs are otherwise well aerated. Bones and soft tissues are unremarkable. Stable suspected mild to moderate left-sided pleural effusion. Xr Chest Portable    Result Date: 7/16/2020  EXAMINATION: ONE XRAY VIEW OF THE CHEST 7/16/2020 5:28 am COMPARISON: Chest single view July 15, 2020 at 1931 hours there is HISTORY: ORDERING SYSTEM PROVIDED HISTORY: infiltrate TECHNOLOGIST PROVIDED HISTORY: infiltrate Reason for Exam: infiltrate follow up Acuity: Unknown Type of Exam: Unknown FINDINGS: Tracheostomy tube is noted in place without evidence of malalignment noted. The heart is mildly enlarged with otherwise unremarkable configuration. The mediastinal contours are within normal limits. Mild to moderate left-sided pleural effusion is noted with adjacent compressive atelectasis at the left lung base. Right lung is well aerated. Bones and soft tissues are unremarkable. No significant interval change in volume of mild to moderate left-sided pleural effusion. Mild cardiomegaly.      Xr Chest Portable    Result Date: 7/14/2020  EXAMINATION: ONE XRAY VIEW OF THE CHEST 7/14/2020 4:58 am COMPARISON: Chest radiograph performed 07/12/2020. HISTORY: ORDERING SYSTEM PROVIDED HISTORY: infiltrate TECHNOLOGIST PROVIDED HISTORY: infiltrate Reason for Exam: daily vent care   trached FINDINGS: There is a left-sided effusion with adjacent left basilar consolidation. There is no pneumothorax. The mediastinal structures are stable. The upper abdomen is unremarkable. The extrathoracic soft tissues are unremarkable. There is a stable tracheostomy tube. Left basilar effusion with adjacent left basilar consolidation consistent with pneumonia. Xr Chest Portable    Result Date: 7/12/2020  EXAMINATION: ONE XRAY VIEW OF THE CHEST 7/12/2020 10:05 am COMPARISON: July 10, 2020 HISTORY: ORDERING SYSTEM PROVIDED HISTORY: pneumonia TECHNOLOGIST PROVIDED HISTORY: pneumonia Reason for Exam: pneumonia. infiltrate Acuity: Unknown Type of Exam: Unknown FINDINGS: Tracheostomy tube in place. Cardiac silhouette is normal in size. Right lung is clear. Worsening left basilar opacity. Worsening left basilar opacity     Xr Chest Portable    Result Date: 7/10/2020  EXAMINATION: ONE XRAY VIEW OF THE CHEST 7/10/2020 4:41 am COMPARISON: July 9, 2020 HISTORY: ORDERING SYSTEM PROVIDED HISTORY: infiltrate TECHNOLOGIST PROVIDED HISTORY: infiltrate Reason for Exam: infiltrate f/u    trached FINDINGS: Tracheostomy tube. Small increased retrocardiac and left lung base opacity. Right lung is clear. No cardiomegaly. No pulmonary edema. Small increased left lung base opacity is nonspecific and may represent atelectasis with pneumonia or aspiration not excluded.      Xr Chest Portable    Result Date: 7/9/2020  EXAMINATION: ONE XRAY VIEW OF THE CHEST 7/9/2020 11:37 am COMPARISON: 07/03/2020 HISTORY: ORDERING SYSTEM PROVIDED HISTORY: Chest Pain TECHNOLOGIST PROVIDED HISTORY: Chest Pain Acuity: Acute Type of Exam: Unknown Initial evaluation, acute chest pain, nontraumatic FINDINGS: The cardiomediastinal silhouette is normal in FINDINGS: Left retrocardiac opacity appears new. No pneumothorax, evidence for edema or significant effusion. The cardiac and mediastinal contours appear unchanged. Tracheostomy tube in place. Small amount of subcutaneous gas projects in the right neck as before. Left retrocardiac opacity may represent atelectasis. Developing consolidation should be considered in the appropriate clinical setting. Unchanged appearance of small amount of subcutaneous emphysema. No evidence for pneumothorax. Xr Chest Portable    Result Date: 6/30/2020  EXAMINATION: ONE XRAY VIEW OF THE CHEST 6/30/2020 5:02 am COMPARISON: June 29, 2020 HISTORY: ORDERING SYSTEM PROVIDED HISTORY: vent TECHNOLOGIST PROVIDED HISTORY: vent Reason for Exam: daily vent care    trached Respiratory failure FINDINGS: Stable tracheostomy. Stable cardiomediastinal contours. No effusion, pneumothorax, or airspace consolidation. Subcutaneous emphysema near the right supraclavicular region. Please correlate with any recent or prior percutaneous intervention at this site. Stable chest including subcutaneous emphysema. Xr Chest Portable    Result Date: 6/29/2020  EXAMINATION: ONE X-RAY VIEW OF THE CHEST 6/29/2020 6:31 am COMPARISON: June 28, 2020 HISTORY: ORDERING SYSTEM PROVIDED HISTORY: Vent TECHNOLOGIST PROVIDED HISTORY: Vent Reason for Exam: Vent Acuity: Unknown Type of Exam: Unknown FINDINGS: Stable lines and tubes including tracheostomy. Stable cardiomediastinal silhouette. The pulmonary system demonstrates no focal consolidation or pleural effusion. No pulmonary edema. No new osseous abnormality. Small amount of subcutaneous gas is seen in the right lower neck. Stable tracheostomy. No acute cardiopulmonary disease. Redemonstration of subcutaneous emphysema in the right lower neck.      Xr Chest Portable    Result Date: 6/28/2020  EXAMINATION: ONE XRAY VIEW OF THE CHEST 6/28/2020 6:21 am COMPARISON: Chest radiograph performed HISTORY: Chest Pain r/o PE TECHNOLOGIST PROVIDED HISTORY: Chest Pain r/o PE Reason for Exam: Pt presents to ed c/o shortness of breath since last night. He has h/o ALS. He has a tracheostomy in place which he received two weeks ago. Lungs diminished throughout. He is tachy on monitor. He was diagnosed with pneumonia last Friday when he was here in the hospital. FINDINGS: Pulmonary Arteries: Pulmonary arteries are adequately opacified for evaluation. No evidence of intraluminal filling defect to suggest pulmonary embolism. Main pulmonary artery is normal in caliber. Mediastinum: Tracheostomy tube in place. No evidence of mediastinal lymphadenopathy. The heart and pericardium demonstrate no acute abnormality. There is no acute abnormality of the thoracic aorta. Lungs/pleura: Debris is present within the distal mainstem bronchus and predominantly extending into the left mainstem bronchus and left lower lobe segmental bronchi with associated left lower lobe consolidative opacity. Consolidative opacity in the inferior lingula is also noted to a lesser degree. There is plugging also noted in the posterior basal segment of the right lower lobe without associated airspace disease. No effusion. No pneumothorax identified. Upper Abdomen: No acute findings. Soft Tissues/Bones: No acute bone or soft tissue abnormality. 1.  Debris in the trachea predominantly extends into the left mainstem bronchus with associated consolidative opacity in the left lower lobe and inferior lingula, suggestive of aspiration pneumonitis. There is also debris in the right lower lobe segmental bronchi without associated airspace disease. 2.  No evidence for acute pulmonary embolism.      Fl Modified Barium Swallow W Video    Result Date: 7/10/2020  EXAMINATION: MODIFIED BARIUM SWALLOW WAS PERFORMED IN CONJUNCTION WITH SPEECH PATHOLOGY SERVICES TECHNIQUE: Fluoroscopic evaluation of the swallowing mechanism was performed with multiple consistency of barium product. FLUOROSCOPY DOSE AND TYPE OR TIME AND EXPOSURES: Fluoro time 2.3 minutes, DAP: 30.9 mGy COMPARISON: None HISTORY: ORDERING SYSTEM PROVIDED HISTORY: aspiration pneumonia TECHNOLOGIST PROVIDED HISTORY: aspiration pneumonia Reason for Exam: Aspiration pneumonia. Acuity: Acute Type of Exam: Subsequent/Follow-up 67-year-old male with aspiration pneumonia FINDINGS: There was no penetration or aspiration with the thick liquid, pureed/pudding thick substance, soft solid or cookie solid substance. There was flash penetration with the thin liquid substance by cup without aspiration. There was no penetration or aspiration with the thin liquid substance by spoon. 1. Flash penetration with the thin liquid substance by cup without aspiration. 2. No penetration or aspiration with the remainder of the administered substances/attempts. Please see separate speech pathology report for full discussion of findings and recommendations. Fl Modified Barium Swallow W Video    Result Date: 6/29/2020  EXAMINATION: MODIFIED BARIUM SWALLOW WAS PERFORMED IN CONJUNCTION WITH SPEECH PATHOLOGY SERVICES TECHNIQUE: Fluoroscopic evaluation of the swallowing mechanism was performed with multiple consistency of barium product. FLUOROSCOPY DOSE AND TYPE OR TIME AND EXPOSURES: 2.4 minutes 25.0 mGy 10 images/cine clips COMPARISON: None HISTORY: ORDERING SYSTEM PROVIDED HISTORY: swallow study post trach placement TECHNOLOGIST PROVIDED HISTORY: swallow study post trach placement Reason for Exam: Recent ALS dx, eval for swallowing difficulties Acuity: Acute Type of Exam: Subsequent/Follow-up FINDINGS: Extended oral phase of swallowing with all consistencies. No laryngeal penetration or tracheal aspiration of nectar thick liquid, pudding, soft solid, regular solid consistencies. Flash laryngeal penetration of thin liquid. Flash laryngeal penetration of thin liquid consistency.   Otherwise, no laryngeal penetration or

## 2020-07-27 NOTE — ED NOTES
Bed: 20  Expected date: 7/27/20  Expected time: 4:02 PM  Means of arrival:   Comments: [address] [place] [address] Mat Yarbrough, [facility]  07/27/20 [id number]

## 2020-07-27 NOTE — H&P
History & Physical  [facility]    Adult Hospitalist      Name: Chivo Koroma  MRN: [id number]     [place]: [de-identified]  Room: [facility]20/20    Admit Date: 7/27/2020  4:14 PM  PCP: Cindy Rojas MD    Primary Problem  Active Problems:    Ventilator associated pneumonia ([address]  Resolved Problems:    * No resolved hospital problems. *        Assesment:     · Acute respiratory failure  · Ventilator dependent  · S/p tracheostomy  · Amyotrophic lateral sclerosis   · Chronic back pain  · Osteoarthritis  · Weight loss         Plan:     · Admit to Progressive  · Monitor vitals closely  · keep spO2 above 90%  · Is/ Os  · Daily weights  · IV fluids  · RT eval  · Zosyn IV  · Consult Pulmonology  · IS  · Resume Mucomyst  · Resume Elavil  · DVT and GI prophylaxis. Chief Complaint:     Chief Complaint   Patient presents with    Shortness of Breath         History of Present Illness:      Chivo Koroma is a 53 y.o.  male who presents with Shortness of Breath    Pt presents with progressive cough, dyspnea and difficulty in breathing. Pt was discharged from the hospital after treatment for aspiration pneumonia. Pt is accompanied with his wife who provides most of the history. Pt says he felt better after antibiotics. However he says soon after completion of the antibiotics he developed cough again. Says it has been 2-3 days that he has been coughing more and he has been feeling more chest tightness and dyspnea. Pt says he does not have much appetite. Denies any chest pain, orthopnea, wheezing, nausea, vomiting, fever, chills, joint swelling or rash. Pt was seen by us in the office and then by Pulmonology who recommended getting a bronchoscopy. Pt was sent to the ER where he got a Covid test and scheduled for the procedure. Abx were started also. I have personally reviewed the past medical history, past surgical history, medications, social history, and family history, and summarized in the note.     Review of Systems:     All 10 point system is reviewed and negative otherwise mentioned in HPI. Past Medical History:     Past Medical History:   Diagnosis Date    Amyotrophic lateral sclerosis (ALS) ([address]     Back pain     on 10/18/19 pt denies pain mgmnt    Degenerative disc disease         Past Surgical History:     Past Surgical History:   Procedure Laterality Date    APPENDECTOMY      COLONOSCOPY  2017    CYSTOSCOPY  10/05/2016    LITHOTRIPSY Right 10/19/2016    LUMBAR FUSION  05/30/2018    LUMBAR INTERBODY FUSION POSTERIOR L3-4    [place] LUMBAR SPINE FUSN,POST INTRBDY N/A 5/30/2018    LUMBAR INTERBODY FUSION POSTERIOR L3-4,  ([address] - OFFICE TO NOTIFY, ROTATING AARON TABLE, C-ARM) performed by Wesley Bojorquez MD at [address] 6/26/2020    TRACHEOSTOMY PERCUTANEOUS performed by Esequiel Khanna MD at [address] [place] [address]        Medications Prior to Admission:       Prior to Admission medications    Medication Sig Start Date End Date Taking? Authorizing Provider   albuterol (PROVENTIL) (2.5 MG/3ML) 0.083% nebulizer solution Take 3 mLs by nebulization every 6 hours as needed for Wheezing 7/19/20   Bonita Whitman MD   amitriptyline (ELAVIL) 25 MG tablet Take 1 tablet by mouth nightly 7/19/20   Bonita Whitman MD   acetylcysteine (MUCOMYST) 20 % nebulizer solution Inhale 3 mLs into the lungs 2 times daily for 10 days 7/19/20 7/29/20  Bonita Whitman MD   betamethasone valerate (VALISONE) 0.1 % cream Apply topically 2 times daily Apply topically 2 times daily. Historical Provider, MD        Allergies:       Sulfa antibiotics    Social History:     Tobacco:    reports that he quit smoking about 31 years ago. His smoking use included cigarettes. He quit after 5.00 years of use. He has never used smokeless tobacco.  Alcohol:      reports previous alcohol use. Drug Use:  reports no history of drug use.     Family History:     Family History   Problem Relation Age of Onset    High Blood Date    INR 1.0 10/18/2019    PROTIME 10.1 10/18/2019       Lab Results   Component Value Date/Time    SPECIAL NOT REPORTED 07/14/2020 12:43 PM    SPECIAL NOT REPORTED 07/14/2020 12:43 PM    SPECIAL NOT REPORTED 07/14/2020 12:43 PM    SPECIAL NOT REPORTED 07/14/2020 12:43 PM     Lab Results   Component Value Date/Time    CULTURE  07/14/2020 12:43 PM     GRAM NEGATIVE RODS 400 CFU/ML Susceptibility testing not performed on low colony count organisms. CULTURE NO GROWTH 13 DAYS 07/14/2020 12:43 PM    CULTURE NO GROWTH 13 DAYS 07/14/2020 12:43 PM       Lab Results   Component Value Date    POCPH 7.39 07/10/2020    POCPCO2 43 07/10/2020    POCPO2 92 07/10/2020    POCHCO3 25.7 07/10/2020    NBEA NOT REPORTED 07/10/2020    PBEA 1 07/10/2020    XEZ5GKX 27 07/10/2020    IMFY9WFY 97 07/10/2020    FIO2 NOT REPORTED 07/10/2020       Radiology:    Xr Chest Portable    Result Date: 7/27/2020  There appears to be rotation of the patient's tracheostomy tube towards the left. Please correlate with positioning in the trachea. No definite acute process is seen. All radiological studies reviewed                Code Status:  Prior    Electronically signed by Sandy Triana MD on 7/27/2020 at 6:28 PM     Copy sent to Dr. Sandy Triana MD    This note was created with the assistance of a speech-recognition program.  Although the intention is to generate a document that actually reflects the content of the visit, no guarantees can be provided that every mistake has been identified and corrected by editing. Note was updated later by me after  physical examination and  completion of the assessment.

## 2020-07-28 ENCOUNTER — CARE COORDINATION (OUTPATIENT)
Dept: CASE MANAGEMENT | Age: 55
End: 2020-07-28

## 2020-07-28 LAB
ANION GAP SERPL CALCULATED.3IONS-SCNC: 16 MMOL/L (ref 9–17)
BUN BLDV-MCNC: 21 MG/DL (ref 6–20)
BUN/CREAT BLD: 36 (ref 9–20)
CALCIUM SERPL-MCNC: 9.3 MG/DL (ref 8.6–10.4)
CHLORIDE BLD-SCNC: 105 MMOL/L (ref 98–107)
CO2: 21 MMOL/L (ref 20–31)
CREAT SERPL-MCNC: 0.59 MG/DL (ref 0.7–1.2)
GFR AFRICAN AMERICAN: >60 ML/MIN
GFR NON-AFRICAN AMERICAN: >60 ML/MIN
GFR SERPL CREATININE-BSD FRML MDRD: ABNORMAL ML/MIN/{1.73_M2}
GFR SERPL CREATININE-BSD FRML MDRD: ABNORMAL ML/MIN/{1.73_M2}
GLUCOSE BLD-MCNC: 106 MG/DL (ref 70–99)
HCT VFR BLD CALC: 38.4 % (ref 40.7–50.3)
HEMOGLOBIN: 12.4 G/DL (ref 13–17)
INR BLD: 1.1
MCH RBC QN AUTO: 30.8 PG (ref 25.2–33.5)
MCHC RBC AUTO-ENTMCNC: 32.3 G/DL (ref 28.4–34.8)
MCV RBC AUTO: 95.5 FL (ref 82.6–102.9)
NRBC AUTOMATED: 0 PER 100 WBC
PDW BLD-RTO: 13.8 % (ref 11.8–14.4)
PLATELET # BLD: 258 K/UL (ref 138–453)
PMV BLD AUTO: 9.7 FL (ref 8.1–13.5)
POTASSIUM SERPL-SCNC: 4 MMOL/L (ref 3.7–5.3)
PROTHROMBIN TIME: 14.1 SEC (ref 11.5–14.2)
RBC # BLD: 4.02 M/UL (ref 4.21–5.77)
SODIUM BLD-SCNC: 142 MMOL/L (ref 135–144)
WBC # BLD: 10.7 K/UL (ref 3.5–11.3)

## 2020-07-28 PROCEDURE — 6370000000 HC RX 637 (ALT 250 FOR IP): Performed by: NURSE PRACTITIONER

## 2020-07-28 PROCEDURE — 96372 THER/PROPH/DIAG INJ SC/IM: CPT

## 2020-07-28 PROCEDURE — G0378 HOSPITAL OBSERVATION PER HR: HCPCS

## 2020-07-28 PROCEDURE — 96366 THER/PROPH/DIAG IV INF ADDON: CPT

## 2020-07-28 PROCEDURE — 6360000002 HC RX W HCPCS: Performed by: FAMILY MEDICINE

## 2020-07-28 PROCEDURE — 85027 COMPLETE CBC AUTOMATED: CPT

## 2020-07-28 PROCEDURE — 85610 PROTHROMBIN TIME: CPT

## 2020-07-28 PROCEDURE — 0BJ08ZZ INSPECTION OF TRACHEOBRONCHIAL TREE, VIA NATURAL OR ARTIFICIAL OPENING ENDOSCOPIC: ICD-10-PCS | Performed by: INTERNAL MEDICINE

## 2020-07-28 PROCEDURE — 80048 BASIC METABOLIC PNL TOTAL CA: CPT

## 2020-07-28 PROCEDURE — 2580000003 HC RX 258: Performed by: FAMILY MEDICINE

## 2020-07-28 PROCEDURE — 6360000002 HC RX W HCPCS: Performed by: INTERNAL MEDICINE

## 2020-07-28 PROCEDURE — 31622 DX BRONCHOSCOPE/WASH: CPT

## 2020-07-28 PROCEDURE — 36415 COLL VENOUS BLD VENIPUNCTURE: CPT

## 2020-07-28 PROCEDURE — 2700000000 HC OXYGEN THERAPY PER DAY

## 2020-07-28 PROCEDURE — 94761 N-INVAS EAR/PLS OXIMETRY MLT: CPT

## 2020-07-28 PROCEDURE — 94640 AIRWAY INHALATION TREATMENT: CPT

## 2020-07-28 PROCEDURE — 6370000000 HC RX 637 (ALT 250 FOR IP): Performed by: FAMILY MEDICINE

## 2020-07-28 RX ORDER — FENTANYL CITRATE 50 UG/ML
100 INJECTION, SOLUTION INTRAMUSCULAR; INTRAVENOUS ONCE
Status: COMPLETED | OUTPATIENT
Start: 2020-07-28 | End: 2020-07-28

## 2020-07-28 RX ORDER — FENTANYL 25 UG/H
1 PATCH TRANSDERMAL
Status: DISCONTINUED | OUTPATIENT
Start: 2020-07-28 | End: 2020-07-29 | Stop reason: HOSPADM

## 2020-07-28 RX ORDER — IPRATROPIUM BROMIDE AND ALBUTEROL SULFATE 2.5; .5 MG/3ML; MG/3ML
1 SOLUTION RESPIRATORY (INHALATION)
Status: DISCONTINUED | OUTPATIENT
Start: 2020-07-28 | End: 2020-07-29 | Stop reason: HOSPADM

## 2020-07-28 RX ORDER — HYDROCODONE BITARTRATE AND ACETAMINOPHEN 5; 325 MG/1; MG/1
1 TABLET ORAL EVERY 4 HOURS PRN
Status: DISCONTINUED | OUTPATIENT
Start: 2020-07-28 | End: 2020-07-29 | Stop reason: HOSPADM

## 2020-07-28 RX ORDER — MIDAZOLAM HYDROCHLORIDE 1 MG/ML
6 INJECTION INTRAMUSCULAR; INTRAVENOUS ONCE
Status: COMPLETED | OUTPATIENT
Start: 2020-07-28 | End: 2020-07-28

## 2020-07-28 RX ADMIN — ACETYLCYSTEINE 600 MG: 200 SOLUTION ORAL; RESPIRATORY (INHALATION) at 09:35

## 2020-07-28 RX ADMIN — PIPERACILLIN AND TAZOBACTAM 3.38 G: 3; .375 INJECTION, POWDER, LYOPHILIZED, FOR SOLUTION INTRAVENOUS at 00:15

## 2020-07-28 RX ADMIN — PIPERACILLIN AND TAZOBACTAM 3.38 G: 3; .375 INJECTION, POWDER, LYOPHILIZED, FOR SOLUTION INTRAVENOUS at 08:57

## 2020-07-28 RX ADMIN — HYDROCODONE BITARTRATE AND ACETAMINOPHEN 1 TABLET: 5; 325 TABLET ORAL at 09:29

## 2020-07-28 RX ADMIN — AMITRIPTYLINE HYDROCHLORIDE 25 MG: 25 TABLET, FILM COATED ORAL at 21:07

## 2020-07-28 RX ADMIN — FENTANYL CITRATE 50 MCG: 50 INJECTION, SOLUTION INTRAMUSCULAR; INTRAVENOUS at 11:10

## 2020-07-28 RX ADMIN — IPRATROPIUM BROMIDE AND ALBUTEROL SULFATE 1 AMPULE: .5; 3 SOLUTION RESPIRATORY (INHALATION) at 20:41

## 2020-07-28 RX ADMIN — ACETYLCYSTEINE 600 MG: 200 SOLUTION ORAL; RESPIRATORY (INHALATION) at 20:41

## 2020-07-28 RX ADMIN — ALBUTEROL SULFATE 2.5 MG: 2.5 SOLUTION RESPIRATORY (INHALATION) at 09:35

## 2020-07-28 RX ADMIN — ENOXAPARIN SODIUM 40 MG: 40 INJECTION SUBCUTANEOUS at 08:57

## 2020-07-28 RX ADMIN — MIDAZOLAM HYDROCHLORIDE 4 MG: 2 INJECTION, SOLUTION INTRAMUSCULAR; INTRAVENOUS at 11:14

## 2020-07-28 RX ADMIN — PIPERACILLIN AND TAZOBACTAM 3.38 G: 3; .375 INJECTION, POWDER, LYOPHILIZED, FOR SOLUTION INTRAVENOUS at 16:18

## 2020-07-28 ASSESSMENT — PAIN DESCRIPTION - LOCATION
LOCATION: BACK;BUTTOCKS
LOCATION: BUTTOCKS;BACK

## 2020-07-28 ASSESSMENT — PAIN DESCRIPTION - PAIN TYPE
TYPE: CHRONIC PAIN

## 2020-07-28 ASSESSMENT — PULMONARY FUNCTION TESTS
PIF_VALUE: 25
PIF_VALUE: 27
PIF_VALUE: 25

## 2020-07-28 ASSESSMENT — PAIN SCALES - GENERAL
PAINLEVEL_OUTOF10: 6
PAINLEVEL_OUTOF10: 5
PAINLEVEL_OUTOF10: 5
PAINLEVEL_OUTOF10: 7
PAINLEVEL_OUTOF10: 6

## 2020-07-28 NOTE — CONSULTS
Pulmonary Medicine and [address], CNP / Dr. Jane Price M.D. Patient - Sheryr Reddy   MRN -  [id number]   Jweel # - [de-identified]   - 1965      Date of Admission -  2020  4:14 PM  Date of evaluation -  2020  Room - -   Darnell Zamarripa MD Primary Care Physician - Britney Bolanos MD     Reason for Consult    Respiratory distress/mucus plugging     Assessment   · Mucus plugging  · Chronic hypoxic and hypercarbic respiratory failure s/p tracheostomy 2020 vent dependent   · ALS  · Recent LLL pneumonia   · ~10-pack-year smoking, during his 20s  · Protein calorie malnutrition     Recommendations   · Vent Support  · Trach care  · Mucomst aerosols BID  · Continue IV Zosyn empirically   · IV fluids  · Albuterol and Ipratropium Q 4 hours and prn  · Patient will need bronch for mucous plugging  · X-ray chest in am  · Labs: CBC and BMP in am  · DVT prophylaxis with low molecular weight heparin  · Long discussion with patient and his wife lastin > 30 min. They would like to have informational meeting with hospice  · Will follow with you    Problem List      Patient Active Problem List   Diagnosis    ureteral calculus ,right    Bradycardia    Renal calculus, right    Lumbar stenosis with neurogenic claudication    Respiratory failure ([address]    Acute respiratory failure with hypercapnia (HCC)    Neuromuscular disorder ([address]    Severe malnutrition (HCC)    Atelectasis    Tracheobronchitis    Acute respiratory failure ([address]    Fever    Ventilator associated pneumonia ([address]       HPI     Sherry Reddy is 53 y.o.,  male, admitted because of respiratory distress. He has a history of ALS, hypoxic respiratory failure s/p tracheostomy 2020. He presented to my office yesterday with increased respiratory distress. He was recently discharged following admission for LLL pneumonia, requiring bronch x 2.  He has complaints of further difficulty breathing, in respiratory distress. Minimal tracheal secretions, no fever, no chills, no hemoptysis, no chest pain. PMHx   Past Medical History      Diagnosis Date    Amyotrophic lateral sclerosis (ALS) ([facility]     Back pain     on 10/18/19 pt denies pain mgmnt    Degenerative disc disease       Past Surgical History        Procedure Laterality Date    APPENDECTOMY      COLONOSCOPY  2017    CYSTOSCOPY  10/05/2016    LITHOTRIPSY Right 10/19/2016    LUMBAR FUSION  05/30/2018    LUMBAR INTERBODY FUSION POSTERIOR L3-4    LA LUMBAR SPINE FUSN,POST INTRBDY N/A 5/30/2018    LUMBAR INTERBODY FUSION POSTERIOR L3-4,  ([address] - OFFICE TO NOTIFY, ROTATING AARON TABLE, C-ARM) performed by Rosario Ambrosio MD at [address] [facility] [address] [address] [facility] [address] 6/26/2020    TRACHEOSTOMY PERCUTANEOUS performed by Angy Loo MD at [address] [facility]    Current Medications    amitriptyline  25 mg Oral Nightly    acetylcysteine  600 mg Inhalation BID    sodium chloride flush  10 mL Intravenous 2 times per day    enoxaparin  40 mg Subcutaneous Daily    piperacillin-tazobactam  3.375 g Intravenous Q8H     HYDROcodone-acetaminophen, albuterol, sodium chloride flush, potassium chloride **OR** potassium alternative oral replacement **OR** potassium chloride, magnesium sulfate, acetaminophen **OR** acetaminophen, polyethylene glycol, promethazine **OR** ondansetron, nicotine  IV Drips/Infusions    Home Medications  Medications Prior to Admission: albuterol (PROVENTIL) (2.5 MG/3ML) 0.083% nebulizer solution, Take 3 mLs by nebulization every 6 hours as needed for Wheezing  amitriptyline (ELAVIL) 25 MG tablet, Take 1 tablet by mouth nightly  acetylcysteine (MUCOMYST) 20 % nebulizer solution, Inhale 3 mLs into the lungs 2 times daily for 10 days  betamethasone valerate (VALISONE) 0.1 % cream, Apply topically 2 times daily Apply topically 2 times daily.     Allergies    Sulfa antibiotics  Social History Social History     Socioeconomic History    Marital status:      Spouse name: Not on file    Number of children: Not on file    Years of education: Not on file    Highest education level: Not on file   Occupational History    Not on file   Social Needs    Financial resource strain: Not on file    Food insecurity     Worry: Not on file     Inability: Not on file    Transportation needs     Medical: Not on file     Non-medical: Not on file   Tobacco Use    Smoking status: Former Smoker     Years: 5.     Types: Cigarettes     Last attempt to quit:      Years since quittin.5    Smokeless tobacco: Never Used   Substance and Sexual Activity    Alcohol use: Not Currently     Comment: very seldom    Drug use: No    Sexual activity: Not on file   Lifestyle    Physical activity     Days per week: Not on file     Minutes per session: Not on file    Stress: Not on file   Relationships    Social connections     Talks on phone: Not on file     Gets together: Not on file     Attends Confucianism service: Not on file     Active member of club or organization: Not on file     Attends meetings of clubs or organizations: Not on file     Relationship status: Not on file    Intimate partner violence     Fear of current or ex partner: Not on file     Emotionally abused: Not on file     Physically abused: Not on file     Forced sexual activity: Not on file   Other Topics Concern    Not on file   Social History Narrative    Not on file     Family History          Problem Relation Age of Onset    High Blood Pressure Mother     Diabetes Mother     Heart Disease Mother      ROS - 11 systems   General Denies any fever or chills  HEENT Denies any diplopia, tinnitus or vertigo  Resp positive for  dyspnea  Cardiac Denies any chest pain, palpitations, claudication or edema  GI Denies any melena, hematochezia, hematemesis or pyrosis   Denies any frequency, urgency, hesitancy or incontinence  Heme Denies Results   Component Value Date     07/28/2020    K 4.0 07/28/2020     07/28/2020    CO2 21 07/28/2020    BUN 21 07/28/2020    CREATININE 0.59 07/28/2020    GLUCOSE 106 07/28/2020    CALCIUM 9.3 07/28/2020    MG 1.9 07/16/2020     LFTS  Lab Results   Component Value Date    ALKPHOS 52 06/07/2020    ALT 47 06/07/2020    AST 35 06/07/2020    PROT 6.9 06/07/2020    BILITOT 0.73 06/07/2020    BILIDIR 0.24 06/07/2020    IBILI 0.49 06/07/2020    LABALBU 4.6 06/07/2020     PTT  Lab Results   Component Value Date    APTT 22.9 (L) 10/20/2019     INR   Lab Results   Component Value Date    INR 1.1 07/28/2020    INR 1.0 10/18/2019    PROTIME 14.1 07/28/2020    PROTIME 10.1 10/18/2019       Radiology    CXR  7/28/2020       (See actual reports for details)    \"Thank you for asking us to see this patient\"    Case discussed with nurse and patient/family. Questions and concerns addressed.     Electronically signed by     DECLAN Nur CNP on 7/28/2020 at 11:27 AM  Patient was seen under the supervision of Dr. Ayanna Ventura and Sleep Medicine,    [facility] [place]: [phone]

## 2020-07-28 NOTE — CONSULTS
..    Palliative Care Inpatient Consult    NAME:  Naila Jade  MEDICAL RECORD NUMBER:  [id number]  AGE: 53 y.o. GENDER: male  : 1965  TODAY'S DATE:  2020    Reasons for Consultation:    Provision of information regarding PC and/or hospice philosophies  Complex, time-intensive communication and interdisciplinary psychosocial support  Clarification of goals of care and/or assistance with difficult decision-making  Guidance in regards to resources and transition(s)    Members of PC team contributing to this consultation are :  Fabrice Guerrero RN    History of Present Illness     The patient is a 53 y.o. Non-/non  male who presents with Shortness of Breath    Referred to Palliative Care by   [] Physician   [] Nursing  [] Family Request   [x] Other: Usman Herrera CNP     He was admitted to the [facility] service for Ventilator associated pneumonia ([facility] [O29.146]. His hospital course has been associated with <principal problem not specified>. The patient has a complicated medical history and has been hospitalized since 2020  4:14 PM.    Active Hospital Problems    Diagnosis Date Noted    Ventilator associated pneumonia [facility]) [J95.851] 2020       Data        Code Status: Full Code     ADVANCED CARE PLANNING:  Patient has capacity for medical decisions: yes  Health Care Power of : no  Living Will: yes     Personal, Social, and Family History  Marital Status:   Living situation:with family:  spouse  Gloria/Spiritual History:   No specific Druze  Psychological Distress: moderate  Does patient understand diagnosis/treatment? yes  Does family/caregiver understand diagnosis/treatment?  yes    Assessment        Palliative Performance Scale:    ___100% Full ambulation; normal activity and work; no evidence of disease; able to do own self care; normal intake; fully conscious  ___90% Full ambulation; normal activity and work; some evidence of disease; able to do own self care; normal intake; fully conscious  ___80% Full ambulation; normal activity with effort; some evidence of disease; able to do own self care; normal or reduced intake; fully conscious  ___70% Ambulation reduced; unable to perform normal job/work; significant disease; able to do own self care; normal or reduced intake; fully conscious  ___60%  Ambulation reduced; cannot do hobbies/housework; significant disease; occasional assist; intake normal or reduced; fully conscious/some confusion  _x__50%  Mainly sit/lie; can't do any work; extensive disease; considerable assist; intake normal or reduced; fully conscious/some confusion  ___40%  Mainly in bed; extensive disease; mainly assist; intake normal or reduced; fully conscious/ some confusion   ___30%  Bed bound; extensive disease; total care; intake reduced; fully conscious/some confusion  ___20%  Bed bound; extensive disease; total care; intake minimal; drowsy/coma  ___10%  Bed bound; extensive disease; total care; mouth care only; drowsy/coma  ___0       Death       Risk Assessments:    Whitney Risk Score: [unfilled]    Readmission Risk Score: 19        1 Year Mortality Risk Score: @1YEARMORTALITYRISK@     Plan        Palliative Interaction: Pulmonary NP came to my office to let me know she talked with patient and his wife about hospice/comfort care. She asked that I follow up with patient about the conversation. I entered patient's room and introduced myself to patient and his wife. Our service was seeing patient on his last admission also. Pt has a trach and cannot speak but can communicate via writing. He is alert and oriented. He understands his disease is progressing. GOALS OF CARE: I discussed with patient and his wife his conversation with the pulmonary NP. Pt agreeable to a hospice meeting for more information. Wife Florinda Grissom states, Jack Low is tired and just wants to be comfortable\". Wife is supportive of patient's decision to meet with hospice.  Pulmonologist

## 2020-07-28 NOTE — FLOWSHEET NOTE
07/28/20 1310   Provider Notification   Reason for Communication Review case   Provider Name Dr. Nereyda Marie   Provider Notification Physician   Method of Communication Face to face   Response At bedside   Notification Time 1310   MD gloria, RN updated him on bronchoscopy and plan for hospice meeting at 1700. New orders received for fentanyl patch. See orders.

## 2020-07-28 NOTE — PROGRESS NOTES
Occupational [address]  Occupational Therapy Not Seen Note    Patient not available for Occupational Therapy due to:    [] Testing:    [] Hemodialysis    [] Cancelled by RN:    []Refusal by Patient:    [] Surgery:     [] Intubation:     [] Pain Medication:    [] Sedation:     [] Spine Precautions :    [] Medical Instability:    [x] Other: Pt unavailable at this time

## 2020-07-28 NOTE — CARE COORDINATION
Discharge planning    Met patient in room along with Breanne KING NP for pulmonary and bedside RN. Patient known to me from prior admissions. H/O ALS and on trilogy vent at home. Second admission due to hypoxia and sob. Gilberto Ca NP had discussion with wife and patient . Patient is ready to speak with hospice. Bethanie Arora RN from palliative care has set up  Meeting tonight with hospice [facility] at [phone]. Will follow and assist as needed. If hospice is declined that may need to see different vent is available for the home     From assessment on 7/10  Insurance Provider: medicare and medicaid      Discharge Planning  Current Residence:  Private home   Living Arrangements:    spouse [address] has 1 stories/5 stairs to climb to enter the home   Support Systems:   wife and mother         Current Services PTA:   1. Home care thru [facility]   2.  Home 02 , trilogy vent thru [employer]     Patient able to perform ADL's:Dependent  DME in home:  Trach supplies, ventilator ( trilogy thru [employer]) home 02 , walker cane, suction, shower stool  DME used to aid ambulation prior to admission:   All above   DME used during admission:  vent     Potential Assistance Needed:    home care vs hospice      Pharmacy: [facility]     Potential Assistance Purchasing Medications:    none  Does patient want to participate in local refill/ meds to beds program?    no      Patient agreeable to home care: Yes  Freedom of choice provided:  yes        Type of Home Care Services:   skilled RN   Patient expects to be discharged to:   home     The Plan for Transition of Care is related to the following treatment goals: skilled RN      T   Prior SNF/Rehab Placement and Facility: none   Agreeable to SNF/Rehab: No  Balsam of choice provided: n/a   Evaluation: no     Expected Discharge date:    7/30  Follow Up Appointment: Best Day/ Time:  any      Transportation provider: per [facility]   Transportation arrangements needed for discharge: Yes     Discharge Plan:   Patient known to me from prior admission . He has ALS and his trilogy NIV was changed to Invasive vent last admission.      In home wife has 02 all the time, vent, suction machine.  [facility] has set up with trach supplies for the next month.      talita is working on getting ensure alive PA and still in process but once medicaid authorizes will be shipped directly to his home     Marla monroy is sydni at [phone]

## 2020-07-28 NOTE — PROGRESS NOTES
[address] Hospitalist        2020   6:08 PM    Name:  Shorty Montalvo  MRN:    [id number]     [place]:     [de-identified]   Room:    IP Day: 0     Admit Date: 2020  4:14 PM  PCP: Liseth Mo MD    C/C:   Chief Complaint   Patient presents with    Shortness of Breath       Assessment:      · Acute respiratory failure  · Ventilator dependent  · S/p tracheostomy  · Amyotrophic lateral sclerosis   · Chronic back pain  · Osteoarthritis  · Weight loss  · Adjustment disorder   · Status post bronchoscopy        Plan:        · Admit to Progressive  · Monitor vitals closely  · keep spO2 above 90%  · Is/ Os  · Daily weights  · IV fluids  · RT eval  · Zosyn IV  · Consult Pulmonology  · IS  · Resume Mucomyst  · Resume Elavil  · DVT and GI prophylaxis  · Hospice consulted  · Start fentanyl patch  · Discharge planning once okay with all      Subjective:     Patient seen and examined at bedside. No overnight events. No acute complaints today. Patient feels much more comfortable  Status post bronchoscopy  Mucous plugs were removed    Afebrile  Pt. Denies any CP, palpitation, HA, dizziness, chills, cough, cold, changes in urination, BM or skin changes or any pain. ROS:  A 10 point system reviewed and negative otherwise mentioned above. Physical Examination:      Vitals:  BP 99/75   Pulse 89   Temp 98 °F (36.7 °C) (Temporal)   Resp 20   Ht 5' 10\" (1.778 m)   Wt 113 lb 15.7 oz (51.7 kg)   SpO2 93%   BMI 16.35 kg/m²   Temp (24hrs), Av.9 °F (36.6 °C), Min:97.8 °F (36.6 °C), Max:98.1 °F (36.7 °C)    Weight:   Wt Readings from Last 3 Encounters:   20 113 lb 15.7 oz (51.7 kg)   20 131 lb (59.4 kg)   20 134 lb (60.8 kg)     I/O last 3 completed shifts:  I/O last 3 completed shifts: In: 540 [P.O.:50; I.V.:440; IV Piggyback:50]  Out: 280 [Urine:280]     No results for input(s): POCGLU in the last 72 hours.       General appearance - alert, well appearing, and in no acute distress  Mental status - oriented to person, place, and time with normal affect  Head - normocephalic and atraumatic  Eyes - pupils equal and reactive, extraocular eye movements intact, conjunctiva clear  Ears - hearing appears to be intact  Nose - no drainage noted  Mouth - mucous membranes moist  Neck - supple, no carotid bruits, tracheostomy with ventilator  Chest - clear to auscultation, normal effort  Heart - normal rate, regular rhythm, no murmur  Abdomen - soft, nontender, nondistended, bowel sounds present all four quadrants, no masses, hepatomegaly or splenomegaly  Neurological - normal speech, no focal findings or movement disorder noted, cranial nerves II through XII grossly intact  Extremities - peripheral pulses palpable, no pedal edema or calf pain with palpation  Skin - no gross lesions, rashes, or induration noted        Medications: Allergies:    Allergies   Allergen Reactions    Sulfa Antibiotics Other (See Comments)     As child (unknown reaction)       Current Meds:   Current Facility-Administered Medications:     HYDROcodone-acetaminophen (NORCO) 5-325 MG per tablet 1 tablet, 1 tablet, Oral, Q4H PRN, Zeynep Painter MD, 1 tablet at 07/28/20 0929    ipratropium-albuterol (DUONEB) nebulizer solution 1 ampule, 1 ampule, Inhalation, Q4H While awake, Blain Denver, APRN - CNP    fentaNYL (DURAGESIC) 25 MCG/HR 1 patch, 1 patch, Transdermal, Q72H, Zeynep Painter MD, 1 patch at 07/28/20 1332    albuterol (PROVENTIL) nebulizer solution 2.5 mg, 2.5 mg, Nebulization, Q6H PRN, Zeynep Painter MD, 2.5 mg at 07/28/20 0935    amitriptyline (ELAVIL) tablet 25 mg, 25 mg, Oral, Nightly, Zeynep Painter MD, 25 mg at 07/27/20 2235    acetylcysteine (MUCOMYST) 20 % solution 600 mg, 600 mg, Inhalation, BID, Zeynep Painter MD, 600 mg at 07/28/20 0935    sodium chloride flush 0.9 % injection 10 mL, 10 mL, Intravenous, 2 times per day, Zeynep Painter MD, 10 mL at 07/27/20 2237   sodium chloride flush 0.9 % injection 10 mL, 10 mL, Intravenous, PRN, Zeynep Painter MD    potassium chloride (KLOR-CON M) extended release tablet 40 mEq, 40 mEq, Oral, PRN **OR** potassium bicarb-citric acid (EFFER-K) effervescent tablet 40 mEq, 40 mEq, Oral, PRN **OR** potassium chloride 10 mEq/100 mL IVPB (Peripheral Line), 10 mEq, Intravenous, PRN, Zeynep Painter MD    magnesium sulfate 1 g in dextrose 5% 100 mL IVPB, 1 g, Intravenous, PRN, Zeynep Painter MD    acetaminophen (TYLENOL) tablet 650 mg, 650 mg, Oral, Q6H PRN **OR** acetaminophen (TYLENOL) suppository 650 mg, 650 mg, Rectal, Q6H PRN, Zeyenp Painter MD    polyethylene glycol (GLYCOLAX) packet 17 g, 17 g, Oral, Daily PRN, Zeynep Painter MD    promethazine (PHENERGAN) tablet 12.5 mg, 12.5 mg, Oral, Q6H PRN **OR** ondansetron (ZOFRAN) injection 4 mg, 4 mg, Intravenous, Q6H PRN, Zeynep Painter MD    nicotine (NICODERM CQ) 21 MG/24HR 1 patch, 1 patch, Transdermal, Daily PRN, Zeynep Painter MD    enoxaparin (LOVENOX) injection 40 mg, 40 mg, Subcutaneous, Daily, Zeynep Painter MD, 40 mg at 07/28/20 0857    piperacillin-tazobactam (ZOSYN) 3.375 g in dextrose 5 % 50 mL IVPB extended infusion (mini-bag), 3.375 g, Intravenous, Q8H, Zeynep Painter MD, Last Rate: 12.5 mL/hr at 07/28/20 1618, 3.375 g at 07/28/20 1618      I/O (24Hr):     Intake/Output Summary (Last 24 hours) at 7/28/2020 1808  Last data filed at 7/28/2020 1300  Gross per 24 hour   Intake 540 ml   Output 280 ml   Net 260 ml       Data:           Labs:    Hematology:  Recent Labs     07/27/20  1700 07/28/20  0452   WBC 11.7* 10.7   RBC 4.43 4.02*   HGB 13.5 12.4*   HCT 42.4 38.4*   MCV 95.7 95.5   MCH 30.5 30.8   MCHC 31.8 32.3   RDW 13.9 13.8    258   MPV 9.3 9.7   INR  --  1.1     Chemistry:  Recent Labs     07/27/20  1700 07/28/20  0452    142   K 4.4 4.0    105   CO2 24 21   GLUCOSE 110* 106*   BUN 22* 21*   CREATININE 0.65* 0.59*   ANIONGAP 14 16   LABGLOM >60 >60

## 2020-07-28 NOTE — PROGRESS NOTES
Dr. Mandy Zuñiga phoned unit to update RN on plan for bedside bronchoscopy around 1030. Orders received to have versed 6 mg and fentanyl 100 mcg available during procedure. RN notified Connor KING of the plan.

## 2020-07-28 NOTE — PLAN OF CARE
Problem: Falls - Risk of:  Goal: Will remain free from falls  Description: Will remain free from falls  Outcome: Ongoing     Problem: Skin Integrity:  Goal: Will show no infection signs and symptoms  Description: Will show no infection signs and symptoms  Outcome: Ongoing  Goal: Absence of new skin breakdown  Description: Absence of new skin breakdown  Outcome: Ongoing     Problem: Pain:  Goal: Control of acute pain  Description: Control of acute pain  Outcome: Ongoing

## 2020-07-28 NOTE — PROGRESS NOTES
Dr. Magan Phillips notified of the hospice meeting outcome and new order received to change code status to DNR CC per patient's wishes. RN filled out form and placed in chart, code status bracelet on patient.

## 2020-07-28 NOTE — PROGRESS NOTES
Patient c/o back and buttocks pain & states he takes norco at home. RN contacted Dr. Giovanny Lakhani at this time and notified him of this information, also updated him that patient has a sacral wound. Order received to norco 5-325 mg 1 tab q4h PRN.

## 2020-07-28 NOTE — PROGRESS NOTES
Occupational Therapy  [facility]  Occupational Therapy Not Seen Note    Patient not available for Occupational Therapy due to:    [] Testing:    [] Hemodialysis    [] Cancelled by RN:    []Refusal by Patient:    [] Surgery:     [] Intubation:     [] Pain Medication:    [] Sedation:     [] Spine Precautions :    [] Medical Instability:    [x] Other: Per  CATALINA Tinsley), pt agreed to hospice and will complete consultation for services today. OT to discontinue order for evaluation based on her recommendations.

## 2020-07-28 NOTE — PROGRESS NOTES
Dr. Marquez Cords to bedside for bronchoscopy. Krystin Gentile RT also present to assist in procedure. RN giving medications per MD order:    1110: 50 mcg fentanyl given. 1111: 2 mg versed given. 1115: 2 mg versed given. 1129: bronchoscopy complete, patient tolerated well.

## 2020-07-28 NOTE — PROGRESS NOTES
Jazz from [facility] on unit at this time and spoke with patient and wife at bedside. Post meeting: Jazz updated RN on plan to change code status to DNR CC & arrange transport home tomorrow between 1130 & 1200. Hospice RN will meet patient & wife at their home tomorrow at that time.     **A copy of the code status form will need to be sent home with patient at discharge**

## 2020-07-28 NOTE — OP NOTE
Operative Note    Procedure Note: Bronchoscopy  Vida Fonseca MD     Pre-op Diagnosis: Mucous plugging  Post-op Diagnosis: Mucous plugging  Bronchoscopist: Vida Fonseca MD  Anesthesia: Conscious Sedation. Total Dose: Versed -4 mgs  Fentanyl -50 micrograms  Procedure: Flexible fiberoptic bronchoscopy    Indications and History  The patient is a 53 y.o. male with mucous plugging/ALS, on chronic home vent. (Please see today's progress notes for the latest issues,  physical exam and lab data)    Consent to Procedure  The risks, benefits, complications, treatment options and expected outcomes were discussed with the patient. The possibilities of reaction to medication, pulmonary aspiration, perforation of a viscus, bleeding, failure to diagnose a condition and creating a complication requiring transfusion or operation were discussed with the patient, who freely signed the consent. Description of Procedure  The patient was intubated on mechanical ventilation and placed on 100% oxygen. Ollen Daquan was monitored by the Critical Nursing and Respiratory therapy staff and the standard ICU monitoring devices. Ollen Daquan and the procedure were verified as Flexible Fiberoptic Bronchoscopy. A Time Out was held and the above information confirmed. The bronchoscope was then passed into the trachea via the tracheostomy tube. Lidocaine 1% solution 4 ml X 1 was applied topically to the sadi. After careful inspection of the tracheal, the bronchoscope was sequentially passed into all segments of right and left endobronchial trees to the second and/or third divisions. Endobronchial findings  Distal trachea: Large amount of thick dried up secretions   Sadi: Sharp and mobile with no mass. Right endobronchial trees: The endobronchial survey was significant for   · No mass  · No bleeding  · No significant inflammation  · No significant bronchomalcia  · moderate mucous plugging  ·   Left endobronchial trees:  The endobronchial survey was significant for   · No mass  · No bleeding  · No significant inflammation  · No significant bronchomalcia  · Moderate to large amount of mucous plugging. Multiple passes of bronc were done. Estimated Blood Loss: None   Complications  None  No specimens were taken. Patient had a very weak cough during the bronchoscopy. Bronch findings were discussed with patient's wife.     Electronically signed by     Dmitri Dubois MD, [place] [id number] and Sleep Medicine,  [facility]  Cell: [phone]  Office: [phone]    7/28/2020 at 11:42 AM

## 2020-07-28 NOTE — CARE COORDINATION
[facility] Transitions Follow Up Call    2020    Patient: Sherry Reddy  Patient : 1965   MRN: <[id number]>  Reason for Admission:   Discharge Date: 20 RARS: Readmission Risk Score: 19    Follow Up:  Patient readmitted on 20. Will continue to follow when discharged.       Future Appointments   Date Time Provider Juarez Rosario   2020  8:40 AM Rashad Henderson MD Neuro Spec Mac Hutton RN

## 2020-07-28 NOTE — PROGRESS NOTES
RT called to assess pt in ER due to sudden SaO2 decrease. Pt's SaO2 low 80's when RT arrived. Pt in-line sxd with saline instill (X2) for large amount thick red/tan. Following aggressive sx, pt's SaO2 returned to 95%. PEEP on pt's trilogy increased to 8. Will wean down.

## 2020-07-29 VITALS
HEIGHT: 70 IN | RESPIRATION RATE: 22 BRPM | HEART RATE: 93 BPM | DIASTOLIC BLOOD PRESSURE: 90 MMHG | BODY MASS INDEX: 17.05 KG/M2 | WEIGHT: 119.1 LBS | TEMPERATURE: 98.7 F | SYSTOLIC BLOOD PRESSURE: 115 MMHG | OXYGEN SATURATION: 98 %

## 2020-07-29 PROCEDURE — 1200000000 HC SEMI PRIVATE

## 2020-07-29 PROCEDURE — 31622 DX BRONCHOSCOPE/WASH: CPT

## 2020-07-29 PROCEDURE — 6370000000 HC RX 637 (ALT 250 FOR IP): Performed by: NURSE PRACTITIONER

## 2020-07-29 PROCEDURE — 96366 THER/PROPH/DIAG IV INF ADDON: CPT

## 2020-07-29 PROCEDURE — 0BJ08ZZ INSPECTION OF TRACHEOBRONCHIAL TREE, VIA NATURAL OR ARTIFICIAL OPENING ENDOSCOPIC: ICD-10-PCS | Performed by: INTERNAL MEDICINE

## 2020-07-29 PROCEDURE — 94640 AIRWAY INHALATION TREATMENT: CPT

## 2020-07-29 PROCEDURE — 96372 THER/PROPH/DIAG INJ SC/IM: CPT

## 2020-07-29 PROCEDURE — 94761 N-INVAS EAR/PLS OXIMETRY MLT: CPT

## 2020-07-29 PROCEDURE — 2580000003 HC RX 258: Performed by: FAMILY MEDICINE

## 2020-07-29 PROCEDURE — G0378 HOSPITAL OBSERVATION PER HR: HCPCS

## 2020-07-29 PROCEDURE — 6360000002 HC RX W HCPCS: Performed by: FAMILY MEDICINE

## 2020-07-29 PROCEDURE — 96376 TX/PRO/DX INJ SAME DRUG ADON: CPT

## 2020-07-29 PROCEDURE — 2500000003 HC RX 250 WO HCPCS: Performed by: INTERNAL MEDICINE

## 2020-07-29 PROCEDURE — 6360000002 HC RX W HCPCS: Performed by: INTERNAL MEDICINE

## 2020-07-29 PROCEDURE — 2700000000 HC OXYGEN THERAPY PER DAY

## 2020-07-29 RX ORDER — HYDROCODONE BITARTRATE AND ACETAMINOPHEN 5; 325 MG/1; MG/1
1 TABLET ORAL EVERY 4 HOURS PRN
Qty: 10 TABLET | Refills: 0 | Status: SHIPPED | OUTPATIENT
Start: 2020-07-29 | End: 2020-08-01

## 2020-07-29 RX ORDER — MIDAZOLAM HYDROCHLORIDE 1 MG/ML
6 INJECTION INTRAMUSCULAR; INTRAVENOUS ONCE
Status: COMPLETED | OUTPATIENT
Start: 2020-07-29 | End: 2020-07-29

## 2020-07-29 RX ORDER — FENTANYL CITRATE 50 UG/ML
100 INJECTION, SOLUTION INTRAMUSCULAR; INTRAVENOUS ONCE
Status: COMPLETED | OUTPATIENT
Start: 2020-07-29 | End: 2020-07-29

## 2020-07-29 RX ORDER — LIDOCAINE HYDROCHLORIDE 10 MG/ML
5 INJECTION, SOLUTION INFILTRATION; PERINEURAL ONCE
Status: COMPLETED | OUTPATIENT
Start: 2020-07-29 | End: 2020-07-29

## 2020-07-29 RX ORDER — PROMETHAZINE HYDROCHLORIDE 12.5 MG/1
12.5 TABLET ORAL EVERY 6 HOURS PRN
Qty: 20 TABLET | Refills: 0 | Status: SHIPPED | OUTPATIENT
Start: 2020-07-29 | End: 2020-08-05

## 2020-07-29 RX ADMIN — MIDAZOLAM 6 MG: 1 INJECTION INTRAMUSCULAR; INTRAVENOUS at 13:34

## 2020-07-29 RX ADMIN — ENOXAPARIN SODIUM 40 MG: 40 INJECTION SUBCUTANEOUS at 08:09

## 2020-07-29 RX ADMIN — IPRATROPIUM BROMIDE AND ALBUTEROL SULFATE 1 AMPULE: .5; 3 SOLUTION RESPIRATORY (INHALATION) at 06:02

## 2020-07-29 RX ADMIN — IPRATROPIUM BROMIDE AND ALBUTEROL SULFATE 1 AMPULE: .5; 3 SOLUTION RESPIRATORY (INHALATION) at 09:47

## 2020-07-29 RX ADMIN — FENTANYL CITRATE 100 MCG: 50 INJECTION, SOLUTION INTRAMUSCULAR; INTRAVENOUS at 13:34

## 2020-07-29 RX ADMIN — PIPERACILLIN AND TAZOBACTAM 3.38 G: 3; .375 INJECTION, POWDER, LYOPHILIZED, FOR SOLUTION INTRAVENOUS at 08:09

## 2020-07-29 RX ADMIN — PIPERACILLIN AND TAZOBACTAM 3.38 G: 3; .375 INJECTION, POWDER, LYOPHILIZED, FOR SOLUTION INTRAVENOUS at 00:03

## 2020-07-29 RX ADMIN — LIDOCAINE HYDROCHLORIDE 5 ML: 10 INJECTION, SOLUTION EPIDURAL; INFILTRATION; INTRACAUDAL; PERINEURAL at 14:00

## 2020-07-29 RX ADMIN — ACETYLCYSTEINE 600 MG: 200 SOLUTION ORAL; RESPIRATORY (INHALATION) at 06:02

## 2020-07-29 ASSESSMENT — PAIN SCALES - GENERAL
PAINLEVEL_OUTOF10: 0

## 2020-07-29 ASSESSMENT — PULMONARY FUNCTION TESTS: PIF_VALUE: 22

## 2020-07-29 NOTE — PROGRESS NOTES
Bronchoscopy completed at bedside with Dr Carolyn Sanchez. Moderate sedation completed per RN. Pt marcio procedure well. Airway clearance completed no samples sent.

## 2020-07-29 NOTE — DISCHARGE INSTR - COC
Continuity of Care Form    Patient Name: Gadiel Costello   :  1965  MRN:  [id number]    Admit date:  2020  Discharge date:      Code Status Order: [facility]   Advance Directives:   Kevyng Revolucijluis angel 33 Directive Type of Healthcare Directive Copy in [address] Agent's Name Healthcare Agent's Phone Number    20  Yes, patient has an advance directive for healthcare treatment -- --  --  --  --          Admitting Physician:  Zane Monae MD  PCP: Zane Monae MD    Discharging Nurse: Donald Rosen Unit/Room#: 2034/2034-01  Discharging Unit Phone Number: [phone]    Emergency Contact:   Extended Emergency Contact Information  Primary Emergency Contact: [facility]  Address:  [address] [place]  Home Phone: [phone]  Work Phone: [phone]  Mobile Phone: [phone]  Relation: Spouse   needed?  No    Past Surgical History:  Past Surgical History:   Procedure Laterality Date    APPENDECTOMY      COLONOSCOPY  2017    CYSTOSCOPY  10/05/2016    LITHOTRIPSY Right 10/19/2016    LUMBAR FUSION  2018    LUMBAR INTERBODY FUSION POSTERIOR L3-4    NV LUMBAR SPINE FUSN,POST INTRBDY N/A 2018    LUMBAR INTERBODY FUSION POSTERIOR L3-4,  (LESLYE & TELLURIDE - OFFICE TO NOTIFY, ROTATING AARON TABLE, C-ARM) performed by Ana Laura Herron MD at [address] N/A 2020    TRACHEOSTOMY PERCUTANEOUS performed by Wilfrido Toribio MD at [facility] History:   Immunization History   Administered Date(s) Administered    Influenza, Triv, 3 Years and older, IM, PF (Afluria 5yrs and older) 10/06/2016       Active Problems:  Patient Active Problem List   Diagnosis Code    ureteral calculus ,right N13.30    Bradycardia R00.1    Renal calculus, right N20.0    Lumbar stenosis with neurogenic claudication M48.062    Respiratory failure 0800   Odor None 07/29/20 0400   Margins Attached edges 07/17/20 1030   Exposed structure Fascia 07/19/20 1545   Michelle-wound Assessment Other (Comment) 07/29/20 0400   Yellow%Wound Bed 100 07/19/20 1545   Number of days: 27        Elimination:  Continence:   · Bowel: Yes  · Bladder: Yes  Urinary Catheter: None   Colostomy/Ileostomy/Ileal Conduit: No       Date of Last BM:      Intake/Output Summary (Last 24 hours) at 7/29/2020 0711  Last data filed at 7/29/2020 7354  Gross per 24 hour   Intake 1010 ml   Output 800 ml   Net 210 ml     I/O last 3 completed shifts: In: 1010 [P.O.:470; I.V.:440; IV Piggyback:100]  Out: 800 [Urine:800]    Safety Concerns: At Risk for Falls    Impairments/Disabilities:      als     Nutrition Therapy:  Current Nutrition Therapy:   - Oral Diet:  General    Routes of Feeding: Oral  Liquids: No Restrictions  Daily Fluid Restriction: no  Last Modified Barium Swallow with Video (Video Swallowing Test): not done    Treatments at the Time of Hospital Discharge:   Respiratory Treatments: see mar and per hospice   Oxygen Therapy:  is on oxygen at 4 L/min per nasal cannula. Ventilator:    - Ventilator Settings:    Vt Ordered: 500 mL  Rate Set: 10 bmp  FiO2 : (4L bled into circuit)    PEEP/CPAP: 8   Will need to add humidity from [employer].    Aerhiral porfirio is sydni at [phone]         Rehab Therapies:    Weight Bearing Status/Restrictions: No weight bearing restirctions  Other Medical Equipment (for information only, NOT a DME order):  wheelchair and bath bench  Other Treatments:   Per hospice of [address] [place] [address]     Patient's personal belongings (please select all that are sent with patient):  None    RN SIGNATURE:  {Esignature:[id number]}    CASE MANAGEMENT/SOCIAL WORK SECTION    Inpatient Status Date: 7/27/20    Readmission Risk Assessment Score:  Readmission Risk              Risk of Unplanned Readmission:        0           Discharging to Facility/ Agency   · Name: [facility] · Address:  · Phone: [phone]  · Fax:     Dialysis Facility (if applicable)   · Name:  · Address:  · Dialysis Schedule:  · Phone:  · Fax:    / signature: Electronically signed by Steve You RN on 7/29/20 at 2:18 PM EDT    PHYSICIAN SECTION    Prognosis: Guarded    Condition at Discharge: Stable    Rehab Potential (if transferring to Rehab): Guarded    Recommended Labs or Other Treatments After Discharge:     Physician Certification: I certify the above information and transfer of Jonatan Tapia  is necessary for the continuing treatment of the diagnosis listed and that he requires Hospice for greater 30 days.      Update Admission H&P: No change in H&P    PHYSICIAN SIGNATURE:  Electronically signed by Latasha Jefferson MD on 7/29/20 at 7:11 AM EDT

## 2020-07-29 NOTE — FLOWSHEET NOTE
Discharge Note:      All discharge instructions given at this time as well as all patient belongings returned to patient. Pt denies any further questions regarding discharge at this time. Pt given discharge instructions/restrictions and medication handouts regarding all discharge medications and side effects. Pt denies any further issues at this time.

## 2020-07-29 NOTE — CARE COORDINATION
Discharge planning    Patient chart reviewed . Was seen by [facility] last night and his decision was to return home today with hospice. Will have to call hospice this am to see if transport has been set up as the time given was between [id number]. Will have to make sure that they know patient transports with trilogy and 02     Trilogy settings was changed earlier in admission and 02 increased to 4 liters. His home order was for 2 but hospice will take over his 02 requirements. Call to [facility] at [phone] to discuss time of dc and transport. Spoke with Tana Mars and the case will open at 1 pm today. They did not set up transport. Call to wife to discuss  time. She stated whenever he is cleared that he can come home      Faxed over medical necessity form. Face sheet and request for transport with patient home trilogy and 4 liters of NC. Placed as on call. Call to dr Adams Ledezma to see when he will be able to complete the discharge. Updated that patient will need to cleared per pulmonary due to respiratory status. Spoke with RN and she stated that dr Adams Ledezma wants pulmonary to see prior to dc. Call to NP and they stated they will be here around 1230 . Call to hospice and moved time to 3 pm for opening. If unable to make cannot make until tomorrow. Call to [facility] and  at 230 arranged. Will await direction from pulmonary     Pulmonary up and will bronch and after can dc to home with hospice. Notified hospice that discharge is still ok for set times. After bronch RT asked that humidification be ordered and verbal order obtained per dr Angeles Mcmahon.  Call to sydni at [facility] and she will call back to update what needs to be done

## 2020-07-29 NOTE — PROGRESS NOTES
Piggyback:100]  Out: 800 [Urine:800]     No results for input(s): POCGLU in the last 72 hours. General appearance - alert, well appearing, and in no acute distress  Mental status - oriented to person, place, and time with normal affect  Head - normocephalic and atraumatic  Eyes - pupils equal and reactive, extraocular eye movements intact, conjunctiva clear  Ears - hearing appears to be intact  Nose - no drainage noted  Mouth - mucous membranes moist  Neck - supple, no carotid bruits, tracheostomy with ventilator  Chest - clear to auscultation, normal effort  Heart - normal rate, regular rhythm, no murmur  Abdomen - soft, nontender, nondistended, bowel sounds present all four quadrants, no masses, hepatomegaly or splenomegaly  Neurological - normal speech, no focal findings or movement disorder noted, cranial nerves II through XII grossly intact  Extremities - peripheral pulses palpable, no pedal edema or calf pain with palpation  Skin - no gross lesions, rashes, or induration noted        Medications: Allergies:    Allergies   Allergen Reactions    Sulfa Antibiotics Other (See Comments)     As child (unknown reaction)       Current Meds:   Current Facility-Administered Medications:     HYDROcodone-acetaminophen (NORCO) 5-325 MG per tablet 1 tablet, 1 tablet, Oral, Q4H PRN, Zeynep Painter MD, 1 tablet at 07/28/20 0929    ipratropium-albuterol (DUONEB) nebulizer solution 1 ampule, 1 ampule, Inhalation, Q4H While awake, DCELAN Alexander - CNP, 1 ampule at 07/29/20 0602    fentaNYL (DURAGESIC) 25 MCG/HR 1 patch, 1 patch, Transdermal, Q72H, Zeynep Painter MD, 1 patch at 07/28/20 1332    albuterol (PROVENTIL) nebulizer solution 2.5 mg, 2.5 mg, Nebulization, Q6H PRN, Zeynep Painter MD, 2.5 mg at 07/28/20 0935    amitriptyline (ELAVIL) tablet 25 mg, 25 mg, Oral, Nightly, Zeynep Painter MD, 25 mg at 07/28/20 2107    acetylcysteine (MUCOMYST) 20 % solution 600 mg, 600 mg, Inhalation, BID, Zeynep Painter MD, 600 mg at 07/29/20 0602    sodium chloride flush 0.9 % injection 10 mL, 10 mL, Intravenous, 2 times per day, Ankur Davis MD, 10 mL at 07/27/20 2236    sodium chloride flush 0.9 % injection 10 mL, 10 mL, Intravenous, PRN, Zeynep Painter MD    potassium chloride (KLOR-CON M) extended release tablet 40 mEq, 40 mEq, Oral, PRN **OR** potassium bicarb-citric acid (EFFER-K) effervescent tablet 40 mEq, 40 mEq, Oral, PRN **OR** potassium chloride 10 mEq/100 mL IVPB (Peripheral Line), 10 mEq, Intravenous, PRN, Zeynep Painter MD    magnesium sulfate 1 g in dextrose 5% 100 mL IVPB, 1 g, Intravenous, PRN, Zeynep Painter MD    acetaminophen (TYLENOL) tablet 650 mg, 650 mg, Oral, Q6H PRN **OR** acetaminophen (TYLENOL) suppository 650 mg, 650 mg, Rectal, Q6H PRN, Zeynep Painter MD    polyethylene glycol (GLYCOLAX) packet 17 g, 17 g, Oral, Daily PRN, Zeynep Painter MD    promethazine (PHENERGAN) tablet 12.5 mg, 12.5 mg, Oral, Q6H PRN **OR** ondansetron (ZOFRAN) injection 4 mg, 4 mg, Intravenous, Q6H PRN, Zeynep Painter MD    nicotine (NICODERM CQ) 21 MG/24HR 1 patch, 1 patch, Transdermal, Daily PRN, Zeynep Painter MD    enoxaparin (LOVENOX) injection 40 mg, 40 mg, Subcutaneous, Daily, Zeynep Painter MD, 40 mg at 07/28/20 0857    piperacillin-tazobactam (ZOSYN) 3.375 g in dextrose 5 % 50 mL IVPB extended infusion (mini-bag), 3.375 g, Intravenous, Q8H, Zeynep Painter MD, Stopped at 07/29/20 0430      I/O (24Hr):     Intake/Output Summary (Last 24 hours) at 7/29/2020 0711  Last data filed at 7/29/2020 4112  Gross per 24 hour   Intake 1010 ml   Output 800 ml   Net 210 ml       Data:           Labs:    Hematology:  Recent Labs     07/27/20  1700 07/28/20  0452   WBC 11.7* 10.7   RBC 4.43 4.02*   HGB 13.5 12.4*   HCT 42.4 38.4*   MCV 95.7 95.5   MCH 30.5 30.8   MCHC 31.8 32.3   RDW 13.9 13.8    258   MPV 9.3 9.7   INR  --  1.1     Chemistry:  Recent Labs     07/27/20 1700 07/28/20  0452    142   K 4.4 4.0  105   CO2 24 21   GLUCOSE 110* 106*   BUN 22* 21*   CREATININE 0.65* 0.59*   ANIONGAP 14 16   LABGLOM >60 >60   GFRAA >60 >60   CALCIUM 9.8 9.3     No results for input(s): PROT, LABALBU, LABA1C, K1XGAES, P6WCNGO, FT4, TSH, AST, ALT, LDH, GGT, ALKPHOS, LABGGT, BILITOT, BILIDIR, AMMONIA, AMYLASE, LIPASE, LACTATE, CHOL, HDL, LDLCHOLESTEROL, CHOLHDLRATIO, TRIG, VLDL, WYN88LY, PHENYTOIN, PHENYF, URICACID, POCGLU in the last 72 hours. Lab Results   Component Value Date/Time    SPECIAL NOT REPORTED 07/14/2020 12:43 PM    SPECIAL NOT REPORTED 07/14/2020 12:43 PM    SPECIAL NOT REPORTED 07/14/2020 12:43 PM    SPECIAL NOT REPORTED 07/14/2020 12:43 PM     Lab Results   Component Value Date/Time    CULTURE  07/14/2020 12:43 PM     GRAM NEGATIVE RODS 400 CFU/ML Susceptibility testing not performed on low colony count organisms. CULTURE NO GROWTH 13 DAYS 07/14/2020 12:43 PM    CULTURE NO GROWTH 13 DAYS 07/14/2020 12:43 PM       Lab Results   Component Value Date    POCPH 7.39 07/10/2020    POCPCO2 43 07/10/2020    POCPO2 92 07/10/2020    POCHCO3 25.7 07/10/2020    NBEA NOT REPORTED 07/10/2020    PBEA 1 07/10/2020    RNJ5LOX 27 07/10/2020    RJSF3PNS 97 07/10/2020    FIO2 NOT REPORTED 07/10/2020       Radiology:    Xr Chest Portable    Result Date: 7/27/2020  There appears to be rotation of the patient's tracheostomy tube towards the left. Please correlate with positioning in the trachea. No definite acute process is seen. All radiological studies reviewed  Code Status:  [facility]        Electronically signed by Mary Kate Robert MD on 7/29/2020 at 7:11 AM    This note was created with the assistance of a speech-recognition program.  Although the intention is to generate a document that actually reflects the content of the visit, no guarantees can be provided that every mistake has been identified and corrected by editing.      Note was updated later by me after  physical examination and  completion of the assessment.

## 2020-07-29 NOTE — PROGRESS NOTES
1345 2mg versed and 50mg fent given per Dr Rachel Kemp order  1350 2mg versed given  [phone] Dr. Dareen Boast at bedside and bronching patient. [phone] Dr. Dareen Boast completed bedside bronch patient tolerated well.

## 2020-07-29 NOTE — PROGRESS NOTES
Pulmonary Critical Care Progress Note  Boris Simon CNP / Dr. Bishop Gilford, M.D. Patient seen for the follow up of     Subjective:  He tolerated bronch yesterday fairly well. He has improved cough slightly. He continues to experience shortness of breath, no complaints of chest pain. He is scheduled to go home today with hospice care. Length of stay: 1 Days    Examination:  Vitals: BP (!) 106/93   Pulse 83   Temp 98.7 °F (37.1 °C) (Temporal)   Resp 20   Ht 5' 10\" (1.778 m)   Wt 119 lb 1.6 oz (54 kg)   SpO2 92%   BMI 17.09 kg/m²     General appearance: alert and cooperative with exam, resting in bed in no distress  Neck: No JVD  Lungs: decreased air exchange  Heart: regular rate and rhythm, S1, S2 normal, no gallop  Abdomen: Soft, non tender, + BS  Extremities: no cyanosis or clubbing. No significant edema    LABs:  CBC:   Recent Labs     07/27/20  1700 07/28/20  0452   WBC 11.7* 10.7   HGB 13.5 12.4*   HCT 42.4 38.4*    258     BMP:   Recent Labs     07/27/20  1700 07/28/20  0452    142   K 4.4 4.0   CO2 24 21   BUN 22* 21*   CREATININE 0.65* 0.59*   LABGLOM >60 >60   GLUCOSE 110* 106*     PT/INR:   Recent Labs     07/28/20  0452   PROTIME 14.1   INR 1.1     Impression:  · Mucus plugging  · Chronic hypoxic and hypercarbic respiratory failure s/p tracheostomy 6/26/2020 vent dependent   · ALS  · Recent LLL pneumonia   · ~10-pack-year smoking, during his 20s  · Protein calorie malnutrition     Recommendations:  · Vent Support  · Trach care  · Mucomst aerosols BID  · Continue IV Zosyn empirically   · IV fluids  · Albuterol and Ipratropium Q 4 hours and prn  · Patient will need bronch for mucous plugging  · DVT prophylaxis with low molecular weight heparin  · Long discussion with patient and his wife lastin > 30 min.  They would like to have informational meeting with hospice  · [facility]  · Discussed with RN  · Will follow with you    Electronically signed by     DECLAN Anton CNP on

## 2020-07-29 NOTE — PLAN OF CARE
Problem: Falls - Risk of:  Goal: Will remain free from falls  Description: Will remain free from falls  7/28/2020 2309 by Brayan Machado RN  Outcome: Ongoing  7/28/2020 1834 by Reuben Garcia RN  Outcome: Ongoing  Goal: Absence of physical injury  Description: Absence of physical injury  Outcome: Ongoing     Problem: Skin Integrity:  Goal: Will show no infection signs and symptoms  Description: Will show no infection signs and symptoms  7/28/2020 2309 by Brayan Machado RN  Outcome: Ongoing  7/28/2020 1834 by Reuben Garcia RN  Outcome: Ongoing  Goal: Absence of new skin breakdown  Description: Absence of new skin breakdown  7/28/2020 2309 by Brayan Machado RN  Outcome: Ongoing  7/28/2020 1834 by Reuben Garcia RN  Outcome: Ongoing     Problem: Pain:  Goal: Pain level will decrease  Description: Pain level will decrease  Outcome: Ongoing  Goal: Control of acute pain  Description: Control of acute pain  7/28/2020 2309 by Brayan Machado RN  Outcome: Ongoing  7/28/2020 1834 by Reuben Garcia RN  Outcome: Ongoing  Goal: Control of chronic pain  Description: Control of chronic pain  Outcome: Ongoing     Problem: Breathing Pattern - Ineffective:  Goal: Ability to achieve and maintain a regular respiratory rate will improve  Description: Ability to achieve and maintain a regular respiratory rate will improve  Outcome: Ongoing     Problem: Discharge Planning:  Goal: Discharged to appropriate level of care  Description: Discharged to appropriate level of care  Outcome: Ongoing

## 2020-07-31 ENCOUNTER — CARE COORDINATION (OUTPATIENT)
Dept: CASE MANAGEMENT | Age: 55
End: 2020-07-31

## 2020-07-31 NOTE — CARE COORDINATION
[facility] Transitions Initial Follow Up Call    Call within 2 business days of discharge: Yes    Patient: Valentino Haley Patient : 1965   MRN: [id number]  Reason for Admission:   Discharge Date: 20 RARS: Readmission Risk Score: 24      Last Discharge [facility]       Complaint Diagnosis Description Type Department Provider    20 Shortness of Breath VAP (ventilator-associated pneumonia) ([facility] [address] . .. ED to Hosp-Admission (Discharged) (ADMITTED) STEPHANIE Clifton MD; [facility]. .. Facility:  [facility]    Follow Up:  Patient readmitted 20-20. Attempted to contact patient for BPCI-A follow up. Unable to reach patient's wife. Left message with contact information and request for call back. [facility]. Spoke with Martin. She confirmed patient receiving hospice services as of 20. Will continue to follow peripherally.         Future Appointments   Date Time Provider Juarez Rosario   2020  8:40 AM Jalyn Rubio MD Neuro Spec Cecily Elizabeth RN

## 2020-08-02 NOTE — DISCHARGE SUMMARY
[address] [place] [address].,    Adult Hospitalist      Patient ID: Toby Davenport  MRN: [id number]     [place]:  [de-identified]       Patient's PCP: Jacki Raza MD    Admit Date: 7/27/2020     Discharge Date: 7/29/2020      Admitting Physician: Jacki Raza MD    Discharge Physician: Jacki Raza MD     CONSULTANTS: Patient Care Team:  Jacki Raza MD as PCP - General Tony Jordan, RN as Care Transitions Nurse  Mian Lagos RN as Care Transitions Nurse    PROCEDURES PERFORMED:     Active Discharge Diagnoses:  · Acute respiratory failure  · Ventilator dependent  · S/p tracheostomy  · Amyotrophic lateral sclerosis   · Chronic back pain  · Osteoarthritis  · Weight loss  · Adjustment disorder   · Status post bronchoscopy      Primary Problem  <principal problem not specified>    Hospital Course: Patient admitted with acute respiratory failure. Patient had undergone tracheostomy and placed on the ventilator at home. However patient was recently discharged and again started having cough and dyspnea. Patient had seen pulmonology in their office who advised patient. COVID testing was done which was negative. Patient was taken for bronchoscopy and multiples mucous plugs were removed. Patient did feel better and was able to breathe better after that. Status was discussed with patient and his wife. Hospice was consulted and the patient and family chose to go with [facility] with their services at home. The plan was discussed in detail with patient who agreed with the plan and verbalized understanding . Patient required removal of mucous plugs again before discharge. Care coordinated through nursing and clinical care coordinator. Arrangements made for home care in addition    The patient was seen and examined on day of discharge and this discharge summary is in conjunction with any daily progress note from day of discharge.     Hospital Data:    Labs:    Hematology:No results for 1 tablet by mouth nightly             betamethasone valerate (VALISONE) 0.1 % cream  Apply topically 2 times daily Apply topically 2 times daily. promethazine (PHENERGAN) 12.5 MG tablet  Take 1 tablet by mouth every 6 hours as needed for Nausea                 Code Status:  Prior    Time Spent on discharge is  35 mins in patient examination, evaluation, counseling as well as medication reconciliation, prescriptions for required medications, discharge plan and follow up. Electronically signed by Mary Kate Robert MD on 8/2/2020 at 1:39 PM     Thank you Dr. Mary Kate Robert MD for the opportunity to be involved in this patient's care. This note was created with the assistance of a speech-recognition program.  Although the intention is to generate a document that actually reflects the content of the visit, no guarantees can be provided that every mistake has been identified and corrected by editing. Note was updated later by me after  physical examination and  completion of the assessment.

## 2020-08-12 ENCOUNTER — HOSPITAL ENCOUNTER (EMERGENCY)
Age: 55
Discharge: HOME | End: 2020-08-12
Attending: EMERGENCY MEDICINE
Payer: COMMERCIAL

## 2020-08-12 ENCOUNTER — TELEPHONE (OUTPATIENT)
Dept: OTHER | Facility: CLINIC | Age: 55
End: 2020-08-12

## 2020-08-12 ENCOUNTER — APPOINTMENT (OUTPATIENT)
Dept: GENERAL RADIOLOGY | Age: 55
End: 2020-08-12
Payer: COMMERCIAL

## 2020-08-12 VITALS
OXYGEN SATURATION: 91 % | DIASTOLIC BLOOD PRESSURE: 79 MMHG | BODY MASS INDEX: 17.04 KG/M2 | HEIGHT: 70 IN | WEIGHT: 119 LBS | TEMPERATURE: 98.6 F | RESPIRATION RATE: 22 BRPM | SYSTOLIC BLOOD PRESSURE: 102 MMHG | HEART RATE: 106 BPM

## 2020-08-12 PROCEDURE — 94761 N-INVAS EAR/PLS OXIMETRY MLT: CPT

## 2020-08-12 PROCEDURE — 71045 X-RAY EXAM CHEST 1 VIEW: CPT

## 2020-08-12 PROCEDURE — 31502 CHANGE OF WINDPIPE AIRWAY: CPT

## 2020-08-12 PROCEDURE — 99283 EMERGENCY DEPT VISIT LOW MDM: CPT

## 2020-08-12 PROCEDURE — 31615 TRCHEOBRNCHSC EST TRACHS INC: CPT

## 2020-08-12 RX ORDER — LORAZEPAM 0.5 MG/1
TABLET ORAL
COMMUNITY
Start: 2020-08-09

## 2020-08-12 RX ORDER — HYDROMORPHONE HYDROCHLORIDE 1 MG/ML
1 SOLUTION ORAL
COMMUNITY
Start: 2020-08-03

## 2020-08-12 NOTE — ED PROVIDER NOTES
indicated that his mother is alive. SOCIAL HISTORY       Social History     Tobacco Use    Smoking status: Former Smoker     Years: 5.00     Types: Cigarettes     Last attempt to quit:      Years since quittin.6    Smokeless tobacco: Never Used   Substance Use Topics    Alcohol use: Not Currently     Comment: very seldom    Drug use: No     PHYSICAL EXAM     INITIAL VITALS: /79   Pulse 106   Temp 98.6 °F (37 °C) (Axillary)   Resp 22   Ht 5' 10\" (1.778 m)   Wt 119 lb (54 kg)   SpO2 91%   BMI 17.07 kg/m²    Physical Exam  HENT:      Head: Normocephalic. Right Ear: External ear normal.      Left Ear: External ear normal.      Nose: Nose normal.   Eyes:      Conjunctiva/sclera: Conjunctivae normal.   Neck:      Comments: Ostomy site clean, dry, patent. Cardiovascular:      Rate and Rhythm: Tachycardia present. Pulmonary:      Effort: Pulmonary effort is normal.   Abdominal:      General: Abdomen is flat. Skin:     General: Skin is dry. Neurological:      Mental Status: He is alert. Mental status is at baseline. Psychiatric:         Mood and Affect: Mood normal.         Behavior: Behavior normal.         MEDICAL DECISION MAKING:   The patient is hemodynamically stable, afebrile, nontoxic-appearing. Physical exam notable for clean, dry, ostomy site. Based on history and exam will replace tracheostomy. DIAGNOSTIC RESULTS   EKG:All EKG's are interpreted by the Emergency Department Physician who either signs or Co-signs this chart in the absence of a cardiologist.        RADIOLOGY:All plain film, CT, MRI, and formal ultrasound images (except ED bedside ultrasound) are read by the radiologist, see reports below, unless otherwisenoted in MDM or here. XR CHEST PORTABLE   Final Result   Midline tracheostomy tube in place. Left basilar heterogeneous opacities could represent atelectasis/scarring or   an infectious/inflammatory process.            LABS: All lab results were reviewed by myself, and all abnormals are listed below. Labs Reviewed - No data to display    EMERGENCY DEPARTMENTCOURSE:   Tracheostomy tube replaced by nursing staff. Placement confirmed by chest x-ray. No further work-up indicated at this time. Vitals:    Vitals:    08/12/20 0542 08/12/20 0556 08/12/20 0559   BP: 102/79     Pulse: 106     Resp:  22    Temp: 98.6 °F (37 °C)     TempSrc: Axillary     SpO2: 93% 91% 91%   Weight: 119 lb (54 kg)     Height: 5' 10\" (1.778 m)         The patient was given the following medications while in the emergency department:  No orders of the defined types were placed in this encounter. CONSULTS:  None    FINAL IMPRESSION      1.  Tracheostomy care [facility])          DISPOSITION/PLAN   DISPOSITION Decision To Discharge 08/12/2020 06:39:21 AM      PATIENT REFERRED TO:  Ken Melgar MD  [facility] [id number] [phone]    In 2 days      DISCHARGE MEDICATIONS:  New Prescriptions    No medications on file     Castro Kiser MD  Attending Emergency Physician                    Landon Krishnamurthy MD  08/12/20 8457

## 2020-08-12 NOTE — ED NOTES
Bed: 20  Expected date:   Expected time:   Means of arrival:   Comments:  [facility], RN  08/12/20 0705

## 2020-08-12 NOTE — TELEPHONE ENCOUNTER
RN Access attempted to contact Dr. Yasmeen Moran Office to schedule ED f/u appt. Attempt unsuccessful.

## 2020-08-12 NOTE — ED NOTES
Pt to ED via [address]  Pt c/o trach tube falling out  Pt has a permanent trach per EMS  Pt arrives with airway maintained by EMS and home ventilator  Pt has hx of ALS  RT x2 at bedside for trach and obutrator replacement, size 6  On arrival pt O2 sat 81%, pt 93% after replacement   EMS report pt is a [facility], wife at bedside       Kennedy Carrizales, [facility]  08/12/20 0327

## 2020-08-13 ENCOUNTER — TELEPHONE (OUTPATIENT)
Dept: OTHER | Facility: CLINIC | Age: 55
End: 2020-08-13

## 2020-08-13 NOTE — TELEPHONE ENCOUNTER
RN Access attempted to contact Dr. Terrance Herman Office to schedule ED f/u appt. Attempt unsuccessful. Phone stated this writer pressed an invalid key and hung up.

## 2020-08-17 LAB
CULTURE: NORMAL
Lab: NORMAL
SPECIMEN DESCRIPTION: NORMAL

## 2020-08-20 LAB
CULTURE: NORMAL
DIRECT EXAM: NORMAL
Lab: NORMAL
SPECIMEN DESCRIPTION: NORMAL

## 2021-07-18 NOTE — PROGRESS NOTES
[facility] Hospitalist Internal Medicine Progress Note      Subjective  ketan  Pt feeling better  Diarrhea less frequent  **    ALLERGIES:  [facility] Meds  Current Facility-Administered Medications   Medication   • potassium CHLORIDE (KLOR-CON M) lewis ER tablet 40 mEq   • atenolol (TENORMIN) tablet 25 mg   • atorvastatin (LIPITOR) tablet 10 mg   • dicyclomine (BENTYL) capsule 10 mg   • hydroCORTisone (ANUSOL-HC) 2.5 % rectal cream 1 application   • pantoprazole (PROTONIX) EC tablet 40 mg   • sodium chloride 0.9 % flush bag 25 mL   • sodium chloride (PF) 0.9 % injection 2 mL   • Potassium Standard Replacement Protocol   • Magnesium Standard Replacement Protocol   • Phosphorus Standard Replacement Protocol   • ondansetron (ZOFRAN) injection 4 mg   • acetaminophen (TYLENOL) tablet 650 mg   • HYDROcodone-acetaminophen (NORCO) 5-325 MG per tablet 1 tablet   • polyethylene glycol (MIRALAX) packet 17 g   • docusate sodium-sennosides (SENOKOT S) 50-8.6 MG 2 tablet   • bisacodyl (DULCOLAX) suppository 10 mg   • magnesium hydroxide (MILK OF MAGNESIA) 400 MG/5ML suspension 30 mL   • sodium chloride 0.9 % flush bag 25 mL   • heparin (porcine) injection 5,000 Units   • lactated ringers infusion   • cefTRIAXone (ROCEPHIN) syringe 2,000 mg   • metroNIDAZOLE (FLAGYL) IVPB 500 mg     Current Facility-Administered Medications   Medication Dose Route Frequency Provider Last Rate Last Admin   • potassium CHLORIDE (KLOR-CON M) lewis ER tablet 40 mEq  40 mEq Oral Once Shavon Cano MD       • atenolol (TENORMIN) tablet 25 mg  25 mg Oral BID Emerald Lara MD   25 mg at 07/18/21 0950   • atorvastatin (LIPITOR) tablet 10 mg  10 mg Oral Daily Emerald Lara MD   10 mg at 07/17/21 2046   • dicyclomine (BENTYL) capsule 10 mg  10 mg Oral TID PRN Emerald Lara MD       • hydroCORTisone (ANUSOL-HC) 2.5 % rectal cream 1 application  1 application Topical PRN Emerald Lara MD       • pantoprazole (PROTONIX) EC tablet 40 mg  40 mg Oral  Home Oxygen Evaluation    Home Oxygen Evaluation completed.     SpO2 on room air on trilogy ventilator 87%  SpO2 on 2 lpm bled in to trilogy ventilator 94%      Testing done at [place] [address]  3:29 PM Daily Emerald Lara MD   40 mg at 07/18/21 0951   • sodium chloride 0.9 % flush bag 25 mL  25 mL Intravenous PRN Emerald Lara MD       • sodium chloride (PF) 0.9 % injection 2 mL  2 mL Intracatheter 2 times per day Emerald Lara MD   2 mL at 07/16/21 2031   • Potassium Standard Replacement Protocol   Does not apply See Admin Instructions Emerald Lara MD       • Magnesium Standard Replacement Protocol   Does not apply See Admin Instructions Emerald Lara MD       • Phosphorus Standard Replacement Protocol   Does not apply See Admin Instructions Emerald Lara MD       • ondansetron (ZOFRAN) injection 4 mg  4 mg Intravenous BID PRN Emerald Lara MD       • acetaminophen (TYLENOL) tablet 650 mg  650 mg Oral Q4H PRN Emerald Lara MD       • HYDROcodone-acetaminophen (NORCO) 5-325 MG per tablet 1 tablet  1 tablet Oral Q4H PRN Emerald Lara MD       • polyethylene glycol (MIRALAX) packet 17 g  17 g Oral Daily PRN Emerald Lara MD       • docusate sodium-sennosides (SENOKOT S) 50-8.6 MG 2 tablet  2 tablet Oral Daily PRN Emerald Lara MD       • bisacodyl (DULCOLAX) suppository 10 mg  10 mg Rectal Daily PRN Emerald Lara MD       • magnesium hydroxide (MILK OF MAGNESIA) 400 MG/5ML suspension 30 mL  30 mL Oral Daily PRN Emerald Lara MD       • sodium chloride 0.9 % flush bag 25 mL  25 mL Intravenous PRN Emerald Lara MD       • heparin (porcine) injection 5,000 Units  5,000 Units Subcutaneous 3 times per day Emerald Lara MD       • lactated ringers infusion   Intravenous Continuous Shavon Cano MD 75 mL/hr at 07/18/21 0950 Rate Change at 07/18/21 0950   • cefTRIAXone (ROCEPHIN) syringe 2,000 mg  2,000 mg Intravenous Daily Emerald Lara MD   2,000 mg at 07/18/21 0950   • metroNIDAZOLE (FLAGYL) IVPB 500 mg  500 mg Intravenous 3 times per day Emerald Lara  mL/hr at 07/18/21 1318 500 mg at 07/18/21 1318          n/Out    Intake/Output Summary (Last 24 hours) at  7/18/2021 1435  Last data filed at 7/18/2021 0700  Gross per 24 hour   Intake 1900 ml   Output --   Net 1900 ml         Physical Exam  Vitals:    07/18/21 0500 07/18/21 0800 07/18/21 1138   Temp: 98.2 °F (36.8 °C) 98.6 °F (37 °C) 98.6 °F (37 °C)   Pulse: 77 77 68   Resp: 12 15 17   SpO2: 96% 98% 98%   BP: 112/70 137/82 117/73          Gen: awake, alert, NAD  heart; due to using papr& full PPE, palpated chest area, nl hr and rhythem  lungs: due to using papr& full PPE, normal percussion of lungs, no resp muscle use.  Abd: soft, mild tenderness on palpation, no rigidity  LE: no edema  Psch: affect normal  hent; normocephalic       Labs     Recent Results (from the past 72 hour(s))   POCT RAPID STREP A    Collection Time: 07/16/21  9:28 AM   Result Value Ref Range    GRP A STREP Negative Negative    Internal Procedural Controls Acceptable Yes    POCT INFLUENZA A/B    Collection Time: 07/16/21  9:28 AM   Result Value Ref Range    Rapid Influenza A Ag Negative Negative, Indeterminate    Rapid Influenza B Ag Negative Negative, Indeterminate   COVID DIAGNOSTIC TEST    Collection Time: 07/16/21  9:29 AM    Specimen: Nasal, Mid-turbinate; Swab   Result Value Ref Range    POCT SARS-COV-2 ANTIGEN Not Detected Not Detected   Comprehensive Metabolic Panel    Collection Time: 07/16/21 11:20 AM   Result Value Ref Range    Fasting Status      Sodium 142 135 - 145 mmol/L    Potassium 2.9 (L) 3.4 - 5.1 mmol/L    Chloride 101 98 - 107 mmol/L    Carbon Dioxide 31 21 - 32 mmol/L    Anion Gap 13 10 - 20 mmol/L    Glucose 109 (H) 65 - 99 mg/dL    BUN 11 6 - 20 mg/dL    Creatinine 0.88 0.51 - 0.95 mg/dL    Glomerular Filtration Rate 80 (L) >90 mL/min/1.73m2    BUN/ Creatinine Ratio 13 7 - 25    Calcium 8.9 8.4 - 10.2 mg/dL    Bilirubin, Total 3.2 (H) 0.2 - 1.0 mg/dL    GOT/AST 19 <=37 Units/L    GPT/ALT 26 <64 Units/L    Alkaline Phosphatase 71 45 - 117 Units/L    Albumin 3.6 3.6 - 5.1 g/dL    Protein, Total 8.4 (H) 6.4 - 8.2 g/dL     Globulin 4.8 (H) 2.0 - 4.0 g/dL    A/G Ratio 0.8 (L) 1.0 - 2.4   Lipase    Collection Time: 07/16/21 11:20 AM   Result Value Ref Range    Lipase 80 73 - 393 Units/L   CBC with Automated Differential (performable only)    Collection Time: 07/16/21 11:20 AM   Result Value Ref Range    WBC 14.6 (H) 4.2 - 11.0 K/mcL    RBC 4.65 4.00 - 5.20 mil/mcL    HGB 13.0 12.0 - 15.5 g/dL    HCT 37.1 36.0 - 46.5 %    MCV 79.8 78.0 - 100.0 fl    MCH 28.0 26.0 - 34.0 pg    MCHC 35.0 32.0 - 36.5 g/dL    RDW-CV 13.6 11.0 - 15.0 %    RDW-SD 39.1 39.0 - 50.0 fL     140 - 450 K/mcL    NRBC 0 <=0 /100 WBC    Neutrophil, Percent 77 %    Lymphocytes, Percent 15 %    Mono, Percent 8 %    Eosinophils, Percent 0 %    Basophils, Percent 0 %    Immature Granulocytes 0 %    Absolute Neutrophils 11.2 (H) 1.8 - 7.7 K/mcL    Absolute Lymphocytes 2.1 1.0 - 4.0 K/mcL    Absolute Monocytes 1.1 (H) 0.3 - 0.9 K/mcL    Absolute Eosinophils  0.0 0.0 - 0.5 K/mcL    Absolute Basophils 0.1 0.0 - 0.3 K/mcL    Absolute Immmature Granulocytes 0.1 0.0 - 0.2 K/mcL   Hepatic Function Panel    Collection Time: 07/16/21 11:20 AM   Result Value Ref Range    Albumin 3.6 3.6 - 5.1 g/dL    Bilirubin, Total 3.0 (H) 0.2 - 1.0 mg/dL    Bilirubin, Direct 0.3 (H) 0.0 - 0.2 mg/dL    Alkaline Phosphatase 68 45 - 117 Units/L    GPT/ALT 28 <64 Units/L    GOT/AST 20 <=37 Units/L    Protein, Total 8.0 6.4 - 8.2 g/dL   Urinalysis & Reflex Microscopy With Culture If Indicated    Collection Time: 07/16/21 12:33 PM   Result Value Ref Range    COLOR, URINALYSIS Yellow     APPEARANCE, URINALYSIS Hazy     GLUCOSE, URINALYSIS Negative Negative mg/dL    BILIRUBIN, URINALYSIS Negative Negative    KETONES, URINALYSIS Negative Negative mg/dL    SPECIFIC GRAVITY, URINALYSIS 1.025 1.005 - 1.030    OCCULT BLOOD, URINALYSIS Trace (A) Negative    PH, URINALYSIS 5.5 5.0 - 7.0    PROTEIN, URINALYSIS Negative Negative mg/dL    UROBILINOGEN, URINALYSIS 0.2 0.2, 1.0 mg/dL    NITRITE, URINALYSIS  Negative Negative    LEUKOCYTE ESTERASE, URINALYSIS Trace (A) Negative    SQUAMOUS EPITHELIAL, URINALYSIS 11 to 25 (A) None Seen, 1 to 5 /hpf    ERYTHROCYTES, URINALYSIS 3 to 5 (A) None Seen, 1 to 2 /hpf    LEUKOCYTES, URINALYSIS 6 to 10 (A) None Seen, 1 to 5 /hpf    BACTERIA, URINALYSIS Few (A) None Seen /hpf    HYALINE CASTS, URINALYSIS None Seen None Seen, 1 to 5 /lpf    RBC CASTS 6 to 10 (A) None Seen /lpf   Urine, Bacterial Culture    Collection Time: 07/16/21 12:33 PM    Specimen: Urine clean catch   Result Value Ref Range    Urine, Bacterial Culture       10,000 - 50,000 CFU/mL Mixed bacterial polo with no predominating type   Electrocardiogram 12-Lead    Collection Time: 07/16/21 12:40 PM   Result Value Ref Range    Ventricular Rate EKG/Min (BPM) 76     Atrial Rate (BPM) 76     OK-Interval (MSEC) 182     QRS-Interval (MSEC) 82     QT-Interval (MSEC) 390     QTc 438     P Axis (Degrees) 12     R Axis (Degrees) -10     T Axis (Degrees) 4     REPORT TEXT       Normal sinus rhythm  Moderate voltage criteria for LVH, may be normal variant  Inferior infarct  , age undetermined  Nonspecific T wave abnormality  Abnormal ECG  No previous ECGs available  Confirmed by BERRY BARRIOS [place] ([id number]) on 7/17/2021 7:28:12 AM     Blood Culture    Collection Time: 07/16/21  3:37 PM    Specimen: Blood   Result Value Ref Range    Culture, Blood or Bone Marrow No Growth 1 Day.    Blood Culture    Collection Time: 07/16/21  3:37 PM    Specimen: Blood   Result Value Ref Range    Culture, Blood or Bone Marrow No Growth 1 Day.    WBC Stool    Collection Time: 07/17/21  3:06 AM   Result Value Ref Range    Stool for WBCs Positive (A) Negative   C Difficile Toxin by PCR    Collection Time: 07/17/21  3:06 AM    Specimen: Stool   Result Value Ref Range    C. Difficile Toxin PCR Not Detected Not Detected   CAMPYLOBACTER, EIA    Collection Time: 07/17/21  3:06 AM    Specimen: Stool   Result Value Ref Range    Campylobacter, EIA   Negative for Campylobacter antigen by Enzyme Immunoassay      Negative for Campylobacter antigen by Enzyme Immunoassay    Comprehensive Metabolic Panel    Collection Time: 07/17/21  5:07 AM   Result Value Ref Range    Fasting Status      Sodium 141 135 - 145 mmol/L    Potassium 2.7 (LL) 3.4 - 5.1 mmol/L    Chloride 102 98 - 107 mmol/L    Carbon Dioxide 30 21 - 32 mmol/L    Anion Gap 12 10 - 20 mmol/L    Glucose 102 (H) 65 - 99 mg/dL    BUN 8 6 - 20 mg/dL    Creatinine 0.63 0.51 - 0.95 mg/dL    Glomerular Filtration Rate >90 >90 mL/min/1.73m2    BUN/ Creatinine Ratio 13 7 - 25    Calcium 8.0 (L) 8.4 - 10.2 mg/dL    Bilirubin, Total 2.1 (H) 0.2 - 1.0 mg/dL    GOT/AST 21 <=37 Units/L    GPT/ALT 21 <64 Units/L    Alkaline Phosphatase 60 45 - 117 Units/L    Albumin 3.0 (L) 3.6 - 5.1 g/dL    Protein, Total 7.1 6.4 - 8.2 g/dL    Globulin 4.1 (H) 2.0 - 4.0 g/dL    A/G Ratio 0.7 (L) 1.0 - 2.4   Sedimentation Rate [facility]    Collection Time: 07/17/21  5:07 AM   Result Value Ref Range    RBC Sedimentation Rate 58 (H) 0 - 20 mm/hr   CBC with Automated Differential (performable only)    Collection Time: 07/17/21  5:07 AM   Result Value Ref Range    WBC 10.6 4.2 - 11.0 K/mcL    RBC 4.17 4.00 - 5.20 mil/mcL    HGB 11.5 (L) 12.0 - 15.5 g/dL    HCT 33.6 (L) 36.0 - 46.5 %    MCV 80.6 78.0 - 100.0 fl    MCH 27.6 26.0 - 34.0 pg    MCHC 34.2 32.0 - 36.5 g/dL    RDW-CV 13.2 11.0 - 15.0 %    RDW-SD 38.8 (L) 39.0 - 50.0 fL     140 - 450 K/mcL    NRBC 0 <=0 /100 WBC    Neutrophil, Percent 75 %    Lymphocytes, Percent 14 %    Mono, Percent 9 %    Eosinophils, Percent 1 %    Basophils, Percent 1 %    Immature Granulocytes 0 %    Absolute Neutrophils 7.9 (H) 1.8 - 7.7 K/mcL    Absolute Lymphocytes 1.5 1.0 - 4.0 K/mcL    Absolute Monocytes 1.0 (H) 0.3 - 0.9 K/mcL    Absolute Eosinophils  0.1 0.0 - 0.5 K/mcL    Absolute Basophils 0.1 0.0 - 0.3 K/mcL    Absolute Immmature Granulocytes 0.0 0.0 - 0.2 K/mcL   Magnesium    Collection Time:  07/17/21  5:07 AM   Result Value Ref Range    Magnesium 1.7 1.7 - 2.4 mg/dL   Potassium    Collection Time: 07/17/21  7:51 PM   Result Value Ref Range    Potassium 3.2 (L) 3.4 - 5.1 mmol/L   Comprehensive Metabolic Panel    Collection Time: 07/18/21  5:01 AM   Result Value Ref Range    Fasting Status      Sodium 141 135 - 145 mmol/L    Potassium 3.3 (L) 3.4 - 5.1 mmol/L    Chloride 104 98 - 107 mmol/L    Carbon Dioxide 32 21 - 32 mmol/L    Anion Gap 8 (L) 10 - 20 mmol/L    Glucose 106 (H) 65 - 99 mg/dL    BUN 9 6 - 20 mg/dL    Creatinine 0.77 0.51 - 0.95 mg/dL    Glomerular Filtration Rate >90 >90 mL/min/1.73m2    BUN/ Creatinine Ratio 12 7 - 25    Calcium 8.4 8.4 - 10.2 mg/dL    Bilirubin, Total 1.3 (H) 0.2 - 1.0 mg/dL    GOT/AST 12 <=37 Units/L    GPT/ALT 24 <64 Units/L    Alkaline Phosphatase 60 45 - 117 Units/L    Albumin 3.0 (L) 3.6 - 5.1 g/dL    Protein, Total 7.3 6.4 - 8.2 g/dL    Globulin 4.3 (H) 2.0 - 4.0 g/dL    A/G Ratio 0.7 (L) 1.0 - 2.4   Magnesium    Collection Time: 07/18/21  5:01 AM   Result Value Ref Range    Magnesium 2.1 1.7 - 2.4 mg/dL   Phosphorus    Collection Time: 07/18/21  5:01 AM   Result Value Ref Range    Phosphorus 3.3 2.4 - 4.7 mg/dL   Iron And total Iron Binding Capacity    Collection Time: 07/18/21  5:01 AM   Result Value Ref Range    Iron 37 (L) 50 - 170 mcg/dL    Iron Binding Capacity 243 (L) 250 - 450 mcg/dL    Iron, Percent Saturation 15 15 - 45 %   CBC with Automated Differential (performable only)    Collection Time: 07/18/21  5:01 AM   Result Value Ref Range    WBC 7.9 4.2 - 11.0 K/mcL    RBC 4.14 4.00 - 5.20 mil/mcL    HGB 11.5 (L) 12.0 - 15.5 g/dL    HCT 33.2 (L) 36.0 - 46.5 %    MCV 80.2 78.0 - 100.0 fl    MCH 27.8 26.0 - 34.0 pg    MCHC 34.6 32.0 - 36.5 g/dL    RDW-CV 13.1 11.0 - 15.0 %    RDW-SD 37.5 (L) 39.0 - 50.0 fL     140 - 450 K/mcL    NRBC 0 <=0 /100 WBC    Neutrophil, Percent 66 %    Lymphocytes, Percent 22 %    Mono, Percent 8 %    Eosinophils, Percent 3 %     Basophils, Percent 1 %    Immature Granulocytes 0 %    Absolute Neutrophils 5.2 1.8 - 7.7 K/mcL    Absolute Lymphocytes 1.7 1.0 - 4.0 K/mcL    Absolute Monocytes 0.6 0.3 - 0.9 K/mcL    Absolute Eosinophils  0.3 0.0 - 0.5 K/mcL    Absolute Basophils 0.1 0.0 - 0.3 K/mcL    Absolute Immmature Granulocytes 0.0 0.0 - 0.2 K/mcL   Potassium    Collection Time: 07/18/21 11:12 AM   Result Value Ref Range    Potassium 3.3 (L) 3.4 - 5.1 mmol/L         Inpatient Medications  Current Facility-Administered Medications   Medication   • potassium CHLORIDE (KLOR-CON M) lewis ER tablet 40 mEq   • atenolol (TENORMIN) tablet 25 mg   • atorvastatin (LIPITOR) tablet 10 mg   • dicyclomine (BENTYL) capsule 10 mg   • hydroCORTisone (ANUSOL-HC) 2.5 % rectal cream 1 application   • pantoprazole (PROTONIX) EC tablet 40 mg   • sodium chloride 0.9 % flush bag 25 mL   • sodium chloride (PF) 0.9 % injection 2 mL   • Potassium Standard Replacement Protocol   • Magnesium Standard Replacement Protocol   • Phosphorus Standard Replacement Protocol   • ondansetron (ZOFRAN) injection 4 mg   • acetaminophen (TYLENOL) tablet 650 mg   • HYDROcodone-acetaminophen (NORCO) 5-325 MG per tablet 1 tablet   • polyethylene glycol (MIRALAX) packet 17 g   • docusate sodium-sennosides (SENOKOT S) 50-8.6 MG 2 tablet   • bisacodyl (DULCOLAX) suppository 10 mg   • magnesium hydroxide (MILK OF MAGNESIA) 400 MG/5ML suspension 30 mL   • sodium chloride 0.9 % flush bag 25 mL   • heparin (porcine) injection 5,000 Units   • lactated ringers infusion   • cefTRIAXone (ROCEPHIN) syringe 2,000 mg   • metroNIDAZOLE (FLAGYL) IVPB 500 mg         Imaging    CT ABDOMEN PELVIS W CONTRAST - IV contrast only   Final Result      Severe terminal ileitis with microperforations along the involved segment.    No evidence of obstruction, abscess or fistula.  The cecum and colon are   normal.  The more proximal small bowel is normal.  No evidence of   spondyloarthropathy.  Differential  diagnosis includes Crohn's disease and   the multiple bacterial and parasitic causes of terminal ileitis.      Electronically Signed by: JORGE BLANC    Signed on: 7/16/2021 2:30 PM              Assessment/Plan  [facility] 63 [facility] with hypertension, hyperlipidemia, sickle cell trait, IBS, morbid obesity, GERD admitted with terminal ileitis with microperforation    Terminal ileitis w/ microperforation/diarrhea  Surgery and GI consulted  Diet per surgery  On abx, ID consulted, appreciate recs  Clinically improving.   Low grade fever, will monitor  +fecal leuks, negative c.diff  -leukocytosis: resolved    Acute Hypokalemia  -monitor and replete  -mg not too impressive, but will replete  [ ] recheck K tonight    Acute hyperbilirubinemia  -improved      UA w/ trace LE noted  Denies dysuria, eitherway on abx    Chronic issues  HL: continue statin  Morbid Obesity: will need OP f/u  Sickle cell trait: no acute issues  GERD: continue PP  HTN: continue meds as above    7/18  -acute hypokalemia: monitor and replete prn  -Hyperbilirubinemia imporving  -anemia w/u c/w PRISCILA and AOCD, will start iron supplement once GI has a plan for egd  [ ] iron supplement  -GIPP pending  -diarrhea improving, will hold iVF  -appreciate further ID recs       Ppx:: heparin suBQ      Shavon Cano MD  [facility] Hospitalist  7/18/2021 2:35 PM

## 2023-05-22 NOTE — PLAN OF CARE
Problem: Falls - Risk of:  Goal: Will remain free from falls  Description: Will remain free from falls  7/2/2020 2214 by Buddy Puentes RN  Outcome: Ongoing  7/2/2020 1252 by Wyatt Naqvi RN  Outcome: Ongoing  Goal: Absence of physical injury  Description: Absence of physical injury  7/2/2020 2214 by Buddy Puentes RN  Outcome: Ongoing  7/2/2020 1252 by Wyatt Naqvi RN  Outcome: Ongoing  Pt is a fall risk this admission. Bed in lowest locked position, items and call light within reach, side rails up X3 per pt preference. Non-skid socks worn and assistive devices within reach. RN will continue to monitor.      Problem: Skin Integrity:  Goal: Will show no infection signs and symptoms  Description: Will show no infection signs and symptoms  Outcome: Ongoing  Goal: Absence of new skin breakdown  Description: Absence of new skin breakdown  Outcome: Ongoing     Problem: Breathing Pattern - Ineffective:  Goal: Ability to achieve and maintain a regular respiratory rate will improve  Description: Ability to achieve and maintain a regular respiratory rate will improve  Outcome: Ongoing       Problem: Pain:  Goal: Pain level will decrease  Description: Pain level will decrease  Outcome: Ongoing  Goal: Control of acute pain  Description: Control of acute pain  Outcome: Ongoing  Goal: Control of chronic pain  Description: Control of chronic pain  Outcome: Ongoing     Problem: Nutrition  Goal: Optimal nutrition therapy  Outcome: Ongoing tony varela

## 2024-03-06 NOTE — CONSULTS
[facility]  Initial Consult Note  Date: 7/1/2020    Hospital day :6     Impression:   1. Amyotrophic lateral sclerosis  2. Acute on chronic hypercapnic respiratory failure status post tracheostomy  3. Fever-unclear source and there is a questionable for left lower lobe infiltrate versus right foot cellulitis  4. Deep tissue injury in the coccygeal area    Recommendations   · Continue Zosyn empirically  · Blood cultures x2 sets sent  · We will monitor the culture data and his clinical progress  · Tissue offloading for the deep tissue injury to the coccyx    Chief complaint/reason for consultation:   Fevers    History of Present Illness:   Dejon Betancourt is a 53y.o.-year-old male who was initially admitted on 6/25/2020. Toya Jain has a history of mild atrophic lateral sclerosis and he came into the hospital with progressive respiratory and neurological decline with acute on chronic respiratory failure. The patient underwent a tracheostomy on 6/26/2020 and had some agitation after the tracheostomy requiring Versed and propofol sedation. He was subsequently able to wean off sedation and was able to wean off the ventilator during the daytime and placed on CPAP at night. The patient did develop fever up to 101 degrees overnight and a sepsis work-up was started including blood cultures and empiric antibiotics with Zosyn. The chest x-ray that was previously clear seem to suggest a possible left lower lobe infiltrate. The fevers have persisted today and I been asked to evaluate and help with antibiotic choice. I have personally reviewed the past medical history, past surgical history, medications, social history, and family history, and I have updated the database accordingly.   Past Medical History:     Past Medical History:   Diagnosis Date    Amyotrophic lateral sclerosis (ALS) ([facility] [address]     Back pain     on 10/18/19 pt denies pain mgmnt    Degenerative disc disease      Past Surgical clubs or organizations: Not on file     Relationship status: Not on file    Intimate partner violence     Fear of current or ex partner: Not on file     Emotionally abused: Not on file     Physically abused: Not on file     Forced sexual activity: Not on file   Other Topics Concern    Not on file   Social History Narrative    Not on file     Family History:     Family History   Problem Relation Age of Onset    High Blood Pressure Mother     Diabetes Mother     Heart Disease Mother       Allergies:   Sulfa antibiotics     Review of Systems:   General: The patient is awake and alert on the vent via trach collar  Eyes: No double vision or blurry vision. ENT: No sore throat or runny nose. Cardiovascular: No chest pain or palpitations. Lung: No shortness of breath or cough. Abdomen: No nausea, vomiting, diarrhea, or abdominal pain. Genitourinary: No increased urinary frequency, or dysuria. Musculoskeletal: No muscle aches or pains. Hematologic: No bleeding or bruising. Neurologic: No headache, weakness, numbness, or tingling. Physical Examination :   BP (!) 113/91   Pulse 80   Temp 100.4 °F (38 °C) (Oral)   Resp 27   Ht 5' 10\" (1.778 m)   Wt 128 lb 11.2 oz (58.4 kg)   SpO2 94%   BMI 18.47 kg/m²     Temperature Range: Temp: 100.4 °F (38 °C) Temp  Av °F (37.8 °C)  Min: 98.2 °F (36.8 °C)  Max: 101.1 °F (38.4 °C)  General Appearance: Awake, alert, and in no apparent distress  Head: Normocephalic, without obvious abnormality, atraumatic  Eyes: Pupils equal, round, reactive, to light and accommodation; extraocular movements intact; sclera anicteric; conjunctivae pink  ENT: Oropharynx clear, without erythema, exudate, or thrush. Neck: Status post tracheostomy and Supple, without lymphadenopathy. Pulmonary/Chest: Scattered rales bilaterally   Cardiovascular: Regular rate and rhythm without murmurs, rubs, or gallops. Abdomen: Soft, nontender, nondistended. Extremities:  There is some redness in Patient/Spouse Collected: 06/30/20 1945   Order Status: Completed Specimen: Blood Updated: 07/01/20 1458    Specimen Description . BLOOD    Special Requests NOT REPORTED    Culture NO GROWTH 16 HOURS           Thank you for allowing us to participate in the care of this patient. Please call with questions. Electronically signed by Erika Jay MD on 7/1/2020 at 6:04 PM      [facility]  Erika Jay MD  [employer]  OFFICE: [phone]      This note is created with the assistance of a speech recognition program.  While intending to generate a document that actually reflects the content of the visit, the document can still have some errors including those of syntax and sound a like substitutions which may escape proof reading. In such instances, actual meaning can be extrapolated by contextual diversion.

## (undated) DEVICE — SUTURE ETHLN SZ 3-0 L18IN NONABSORBABLE BLK L24MM PS-1 3/8 1663G

## (undated) DEVICE — SUTURE VCRL SZ 0 L18IN ABSRB VLT L36MM CT-1 1/2 CIR J740D

## (undated) DEVICE — GLOVE SURG SZ 75 CRM LTX FREE POLYISOPRENE POLYMER BEAD ANTI

## (undated) DEVICE — NEEDLE SPNL 18GA L3.5IN W/ QNCKE SHARPER BVL DURA CLICK

## (undated) DEVICE — CONNECTOR TBNG WHT PLAS SUCT STR 5IN1 LTWT W/ M CONN

## (undated) DEVICE — OIL CARTRIDGE: Brand: CORE, MAESTRO

## (undated) DEVICE — 3M™ IOBAN™ 2 ANTIMICROBIAL INCISE DRAPE 6650EZ: Brand: IOBAN™ 2

## (undated) DEVICE — PAD,NON-ADHERENT,3X8,STERILE,LF,1/PK: Brand: MEDLINE

## (undated) DEVICE — ELECTRODE ELECSURG NDL 2.8 INX7.2 CM COAT INSUL EDGE

## (undated) DEVICE — Z DISCONTINUED BY MEDLINE USE 2716051 TUBE TRACH SZ 6 L74MM OD10.8MM ID6.4MM CUF W/ DISP INNR

## (undated) DEVICE — MITT PREP W575XL775IN POVIDONE IOD HAIR REMV

## (undated) DEVICE — TOWEL,OR,DSP,ST,BLUE,DLX,XR,4/PK,20PK/CS: Brand: MEDLINE

## (undated) DEVICE — PACK PROCEDURE SURG LUMBAR SPINE SVMMC

## (undated) DEVICE — DRAPE C ARM UNIV W41XL74IN CLR PLAS XR VELC CLSR POLY STRP

## (undated) DEVICE — DIFFUSER: Brand: CORE, MAESTRO

## (undated) DEVICE — PENCIL ES L3M BTTN SWCH HOLSTER W/ BLDE ELECTRD EDGE

## (undated) DEVICE — GLOVE ORANGE PI 7   MSG9070

## (undated) DEVICE — SUTURE VCRL SZ 2-0 L27IN ABSRB UD L36MM CT-1 1/2 CIR JJ42G

## (undated) DEVICE — STRIP,CLOSURE,WOUND,MEDI-STRIP,1/2X4: Brand: MEDLINE

## (undated) DEVICE — THE MILL DISPOSABLE - MEDIUM

## (undated) DEVICE — GLOVE ORANGE PI 7 1/2   MSG9075

## (undated) DEVICE — RESERVOIR,SUCTION,100CC,SILICONE: Brand: MEDLINE

## (undated) DEVICE — STANDARD HYPODERMIC NEEDLE,POLYPROPYLENE HUB: Brand: MONOJECT

## (undated) DEVICE — CODMAN® SURGICAL PATTIES 1/2" X 1/2" (1.27CM X 1.27CM): Brand: CODMAN®

## (undated) DEVICE — SHEET, T, LAPAROTOMY, STERILE: Brand: MEDLINE

## (undated) DEVICE — PROTECTOR ULN NRV PUR FOAM HK LOOP STRP ANATOMICALLY

## (undated) DEVICE — CONTROL SYRINGE LUER-LOCK TIP: Brand: MONOJECT

## (undated) DEVICE — CHLORAPREP 26ML ORANGE

## (undated) DEVICE — 1010 S-DRAPE TOWEL DRAPE 10/BX: Brand: STERI-DRAPE™

## (undated) DEVICE — SUTURE VCRL SZ 3-0 L18IN ABSRB UD L26MM SH 1/2 CIR J864D

## (undated) DEVICE — INTENDED FOR TISSUE SEPARATION, AND OTHER PROCEDURES THAT REQUIRE A SHARP SURGICAL BLADE TO PUNCTURE OR CUT.: Brand: BARD-PARKER ® CARBON RIB-BACK BLADES

## (undated) DEVICE — 6.0MM PRECISION ROUND

## (undated) DEVICE — SUTURE STRATAFIX SYMMETRIC PDS + SZ 0 L18IN ABSRB L36MM SXPP1A401

## (undated) DEVICE — GAUZE,SPONGE,4"X4",16PLY,XRAY,STRL,LF: Brand: MEDLINE

## (undated) DEVICE — Device

## (undated) DEVICE — DRAPE,REIN 53X77,STERILE: Brand: MEDLINE

## (undated) DEVICE — DRESSING BORDERED ADH GZ UNIV GEN USE 8INX4IN AND 6INX2IN

## (undated) DEVICE — DRAIN SURG W10XL20CM SIL SMOOTH FLAT 3/4 PERF DBL WRP

## (undated) DEVICE — 3M™ STERI-STRIP™ COMPOUND BENZOIN TINCTURE 40 BAGS/CARTON 4 CARTONS/CASE C1544: Brand: 3M™ STERI-STRIP™

## (undated) DEVICE — GLOVE SURG SZ 65 THK91MIL LTX FREE SYN POLYISOPRENE

## (undated) DEVICE — TOTAL TRAY, 16FR 10ML SIL FOLEY, URN: Brand: MEDLINE

## (undated) DEVICE — GLOVE SURG SZ 7 L12IN FNGR THK79MIL GRN LTX FREE

## (undated) DEVICE — TOWEL,OR,DSP,ST,NATURAL,DLX,4/PK,20PK/CS: Brand: MEDLINE

## (undated) DEVICE — GOWN,AURORA,NONREINFORCED,LARGE: Brand: MEDLINE

## (undated) DEVICE — DRESSING TRNSPAR W5XL4.5IN FLM SHT SEMIPERMEABLE WIND

## (undated) DEVICE — ELECTRODE PT RET AD L9FT HI MOIST COND ADH HYDRGEL CORDED

## (undated) DEVICE — GLOVE SURG SZ 8 L12IN FNGR THK79MIL GRN LTX FREE

## (undated) DEVICE — SHEARS ENDOSCP L9CM CRV HARM FOCS +